# Patient Record
Sex: FEMALE | Race: WHITE | NOT HISPANIC OR LATINO | Employment: FULL TIME | URBAN - METROPOLITAN AREA
[De-identification: names, ages, dates, MRNs, and addresses within clinical notes are randomized per-mention and may not be internally consistent; named-entity substitution may affect disease eponyms.]

---

## 2017-02-23 ENCOUNTER — ALLSCRIPTS OFFICE VISIT (OUTPATIENT)
Dept: OTHER | Facility: OTHER | Age: 52
End: 2017-02-23

## 2017-02-23 ENCOUNTER — TRANSCRIBE ORDERS (OUTPATIENT)
Dept: ADMINISTRATIVE | Facility: HOSPITAL | Age: 52
End: 2017-02-23

## 2017-02-23 DIAGNOSIS — M25.532 LEFT WRIST PAIN: Primary | ICD-10-CM

## 2017-03-02 ENCOUNTER — HOSPITAL ENCOUNTER (OUTPATIENT)
Dept: RADIOLOGY | Age: 52
Discharge: HOME | End: 2017-03-02
Payer: COMMERCIAL

## 2017-03-02 DIAGNOSIS — M25.532 LEFT WRIST PAIN: ICD-10-CM

## 2017-03-02 PROCEDURE — 73221 MRI JOINT UPR EXTREM W/O DYE: CPT

## 2017-03-28 ENCOUNTER — ALLSCRIPTS OFFICE VISIT (OUTPATIENT)
Dept: OTHER | Facility: OTHER | Age: 52
End: 2017-03-28

## 2017-09-12 ENCOUNTER — ALLSCRIPTS OFFICE VISIT (OUTPATIENT)
Dept: OTHER | Facility: OTHER | Age: 52
End: 2017-09-12

## 2017-09-12 ENCOUNTER — TRANSCRIBE ORDERS (OUTPATIENT)
Dept: ADMINISTRATIVE | Facility: HOSPITAL | Age: 52
End: 2017-09-12

## 2017-09-12 DIAGNOSIS — M79.10 MYALGIA: ICD-10-CM

## 2017-09-12 DIAGNOSIS — G44.219 EPISODIC TENSION-TYPE HEADACHE, NOT INTRACTABLE: ICD-10-CM

## 2017-09-12 DIAGNOSIS — E78.00 PURE HYPERCHOLESTEROLEMIA: ICD-10-CM

## 2017-09-12 DIAGNOSIS — M54.2 CERVICALGIA: ICD-10-CM

## 2017-09-12 DIAGNOSIS — G44.219 EPISODIC TENSION-TYPE HEADACHE, NOT INTRACTABLE: Primary | ICD-10-CM
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
## 2017-09-16 ENCOUNTER — APPOINTMENT (OUTPATIENT)
Dept: LAB | Facility: MEDICAL CENTER | Age: 52
End: 2017-09-16
Payer: COMMERCIAL

## 2017-09-16 DIAGNOSIS — E78.00 PURE HYPERCHOLESTEROLEMIA: ICD-10-CM

## 2017-09-16 DIAGNOSIS — M54.2 CERVICALGIA: ICD-10-CM

## 2017-09-16 DIAGNOSIS — G44.219 EPISODIC TENSION-TYPE HEADACHE, NOT INTRACTABLE: ICD-10-CM

## 2017-09-16 DIAGNOSIS — M79.10 MYALGIA: ICD-10-CM

## 2017-09-16 LAB
25(OH)D3 SERPL-MCNC: 31.8 NG/ML (ref 30–100)
ALBUMIN SERPL BCP-MCNC: 4.1 G/DL (ref 3.5–5)
ALP SERPL-CCNC: 58 U/L (ref 46–116)
ALT SERPL W P-5'-P-CCNC: 24 U/L (ref 12–78)
ANION GAP SERPL CALCULATED.3IONS-SCNC: 6 MMOL/L (ref 4–13)
AST SERPL W P-5'-P-CCNC: 19 U/L (ref 5–45)
BASOPHILS # BLD AUTO: 0.04 THOUSANDS/ΜL (ref 0–0.1)
BASOPHILS NFR BLD AUTO: 1 % (ref 0–1)
BILIRUB SERPL-MCNC: 0.54 MG/DL (ref 0.2–1)
BUN SERPL-MCNC: 16 MG/DL (ref 5–25)
CALCIUM SERPL-MCNC: 9.1 MG/DL (ref 8.3–10.1)
CHLORIDE SERPL-SCNC: 107 MMOL/L (ref 100–108)
CHOLEST SERPL-MCNC: 241 MG/DL (ref 50–200)
CO2 SERPL-SCNC: 28 MMOL/L (ref 21–32)
CREAT SERPL-MCNC: 0.89 MG/DL (ref 0.6–1.3)
EOSINOPHIL # BLD AUTO: 0.08 THOUSAND/ΜL (ref 0–0.61)
EOSINOPHIL NFR BLD AUTO: 2 % (ref 0–6)
ERYTHROCYTE [DISTWIDTH] IN BLOOD BY AUTOMATED COUNT: 13.3 % (ref 11.6–15.1)
ERYTHROCYTE [SEDIMENTATION RATE] IN BLOOD: 8 MM/HOUR (ref 0–20)
GFR SERPL CREATININE-BSD FRML MDRD: 75 ML/MIN/1.73SQ M
GLUCOSE P FAST SERPL-MCNC: 89 MG/DL (ref 65–99)
HCT VFR BLD AUTO: 39.1 % (ref 34.8–46.1)
HDLC SERPL-MCNC: 92 MG/DL (ref 40–60)
HGB BLD-MCNC: 12.9 G/DL (ref 11.5–15.4)
LDLC SERPL CALC-MCNC: 134 MG/DL (ref 0–100)
LYMPHOCYTES # BLD AUTO: 2.19 THOUSANDS/ΜL (ref 0.6–4.47)
LYMPHOCYTES NFR BLD AUTO: 45 % (ref 14–44)
MCH RBC QN AUTO: 30.8 PG (ref 26.8–34.3)
MCHC RBC AUTO-ENTMCNC: 33 G/DL (ref 31.4–37.4)
MCV RBC AUTO: 93 FL (ref 82–98)
MONOCYTES # BLD AUTO: 0.38 THOUSAND/ΜL (ref 0.17–1.22)
MONOCYTES NFR BLD AUTO: 8 % (ref 4–12)
NEUTROPHILS # BLD AUTO: 2.12 THOUSANDS/ΜL (ref 1.85–7.62)
NEUTS SEG NFR BLD AUTO: 44 % (ref 43–75)
NRBC BLD AUTO-RTO: 0 /100 WBCS
PLATELET # BLD AUTO: 256 THOUSANDS/UL (ref 149–390)
PMV BLD AUTO: 10.8 FL (ref 8.9–12.7)
POTASSIUM SERPL-SCNC: 3.8 MMOL/L (ref 3.5–5.3)
PROT SERPL-MCNC: 7.5 G/DL (ref 6.4–8.2)
RBC # BLD AUTO: 4.19 MILLION/UL (ref 3.81–5.12)
SODIUM SERPL-SCNC: 141 MMOL/L (ref 136–145)
TRIGL SERPL-MCNC: 76 MG/DL
TSH SERPL DL<=0.05 MIU/L-ACNC: 2.29 UIU/ML (ref 0.36–3.74)
WBC # BLD AUTO: 4.82 THOUSAND/UL (ref 4.31–10.16)

## 2017-09-16 PROCEDURE — 86038 ANTINUCLEAR ANTIBODIES: CPT

## 2017-09-16 PROCEDURE — 86618 LYME DISEASE ANTIBODY: CPT

## 2017-09-16 PROCEDURE — 86430 RHEUMATOID FACTOR TEST QUAL: CPT

## 2017-09-16 PROCEDURE — 84443 ASSAY THYROID STIM HORMONE: CPT

## 2017-09-16 PROCEDURE — 82306 VITAMIN D 25 HYDROXY: CPT

## 2017-09-16 PROCEDURE — 86617 LYME DISEASE ANTIBODY: CPT

## 2017-09-16 PROCEDURE — 85652 RBC SED RATE AUTOMATED: CPT

## 2017-09-16 PROCEDURE — 36415 COLL VENOUS BLD VENIPUNCTURE: CPT

## 2017-09-16 PROCEDURE — 85025 COMPLETE CBC W/AUTO DIFF WBC: CPT

## 2017-09-16 PROCEDURE — 80053 COMPREHEN METABOLIC PANEL: CPT

## 2017-09-16 PROCEDURE — 80061 LIPID PANEL: CPT

## 2017-09-18 ENCOUNTER — GENERIC CONVERSION - ENCOUNTER (OUTPATIENT)
Dept: OTHER | Facility: OTHER | Age: 52
End: 2017-09-18

## 2017-09-18 LAB
B BURGDOR IGG SER IA-ACNC: 0.14
B BURGDOR IGM SER IA-ACNC: 0.89
RHEUMATOID FACT SER QL LA: NEGATIVE
RYE IGE QN: NEGATIVE

## 2017-09-19 ENCOUNTER — GENERIC CONVERSION - ENCOUNTER (OUTPATIENT)
Dept: OTHER | Facility: OTHER | Age: 52
End: 2017-09-19

## 2017-09-19 ENCOUNTER — HOSPITAL ENCOUNTER (OUTPATIENT)
Dept: RADIOLOGY | Facility: HOSPITAL | Age: 52
Discharge: HOME | End: 2017-09-19
Payer: COMMERCIAL

## 2017-09-19 DIAGNOSIS — G44.219 EPISODIC TENSION-TYPE HEADACHE, NOT INTRACTABLE: ICD-10-CM

## 2017-09-19 LAB
B BURGDOR IGG PATRN SER IB-IMP: NEGATIVE
B BURGDOR IGM PATRN SER IB-IMP: NEGATIVE
B BURGDOR18KD IGG SER QL IB: ABNORMAL
B BURGDOR23KD IGG SER QL IB: ABNORMAL
B BURGDOR23KD IGM SER QL IB: ABNORMAL
B BURGDOR28KD IGG SER QL IB: ABNORMAL
B BURGDOR30KD IGG SER QL IB: ABNORMAL
B BURGDOR39KD IGG SER QL IB: ABNORMAL
B BURGDOR39KD IGM SER QL IB: ABNORMAL
B BURGDOR41KD IGG SER QL IB: PRESENT
B BURGDOR41KD IGM SER QL IB: ABNORMAL
B BURGDOR45KD IGG SER QL IB: ABNORMAL
B BURGDOR58KD IGG SER QL IB: ABNORMAL
B BURGDOR66KD IGG SER QL IB: ABNORMAL
B BURGDOR93KD IGG SER QL IB: ABNORMAL

## 2017-09-19 PROCEDURE — 70551 MRI BRAIN STEM W/O DYE: CPT

## 2017-09-20 ENCOUNTER — GENERIC CONVERSION - ENCOUNTER (OUTPATIENT)
Dept: OTHER | Facility: OTHER | Age: 52
End: 2017-09-20

## 2017-10-14 ENCOUNTER — APPOINTMENT (OUTPATIENT)
Dept: LAB | Facility: MEDICAL CENTER | Age: 52
End: 2017-10-14
Payer: COMMERCIAL
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
## 2017-10-14 PROCEDURE — 36415 COLL VENOUS BLD VENIPUNCTURE: CPT

## 2017-10-14 PROCEDURE — 86617 LYME DISEASE ANTIBODY: CPT

## 2017-10-14 PROCEDURE — 86618 LYME DISEASE ANTIBODY: CPT

## 2017-10-16 ENCOUNTER — GENERIC CONVERSION - ENCOUNTER (OUTPATIENT)
Dept: OTHER | Facility: OTHER | Age: 52
End: 2017-10-16

## 2017-10-16 LAB
B BURGDOR IGG SER IA-ACNC: 0.17
B BURGDOR IGM SER IA-ACNC: 0.84

## 2017-10-17 LAB

## 2017-10-18 ENCOUNTER — GENERIC CONVERSION - ENCOUNTER (OUTPATIENT)
Dept: OTHER | Facility: OTHER | Age: 52
End: 2017-10-18

## 2017-10-29 ENCOUNTER — HOSPITAL ENCOUNTER (EMERGENCY)
Facility: HOSPITAL | Age: 52
Discharge: HOME | End: 2017-10-29
Attending: EMERGENCY MEDICINE | Admitting: EMERGENCY MEDICINE
Payer: COMMERCIAL

## 2017-10-29 VITALS
OXYGEN SATURATION: 99 % | HEIGHT: 66 IN | DIASTOLIC BLOOD PRESSURE: 73 MMHG | WEIGHT: 148 LBS | HEART RATE: 52 BPM | TEMPERATURE: 97.4 F | RESPIRATION RATE: 18 BRPM | BODY MASS INDEX: 23.78 KG/M2 | SYSTOLIC BLOOD PRESSURE: 149 MMHG

## 2017-10-29 DIAGNOSIS — R51.9 HEADACHE: Primary | ICD-10-CM

## 2017-10-29 LAB
BACTERIA UR QL AUTO: NORMAL /HPF
BILIRUB UR QL STRIP: NEGATIVE
CLARITY UR: CLEAR
COLOR UR: YELLOW
EXT PREG TEST URINE: NEGATIVE
GLUCOSE UR STRIP-MCNC: NEGATIVE MG/DL
HGB UR QL STRIP.AUTO: NEGATIVE
HYALINE CASTS #/AREA URNS LPF: NORMAL /LPF
KETONES UR STRIP-MCNC: NEGATIVE MG/DL
LEUKOCYTE ESTERASE UR QL STRIP: ABNORMAL
NITRITE UR QL STRIP: NEGATIVE
NON-SQ EPI CELLS URNS QL MICRO: NORMAL /HPF
PH UR STRIP.AUTO: 6 [PH] (ref 4.5–8)
PROT UR STRIP-MCNC: NEGATIVE MG/DL
RBC #/AREA URNS AUTO: NORMAL /HPF
SP GR UR STRIP.AUTO: 1.01 (ref 1–1.03)
UROBILINOGEN UR QL STRIP.AUTO: 0.2 E.U./DL
WBC #/AREA URNS AUTO: NORMAL /HPF

## 2017-10-29 PROCEDURE — 96361 HYDRATE IV INFUSION ADD-ON: CPT

## 2017-10-29 PROCEDURE — 81001 URINALYSIS AUTO W/SCOPE: CPT

## 2017-10-29 PROCEDURE — 81025 URINE PREGNANCY TEST: CPT | Performed by: EMERGENCY MEDICINE

## 2017-10-29 PROCEDURE — 99283 EMERGENCY DEPT VISIT LOW MDM: CPT

## 2017-10-29 PROCEDURE — 96375 TX/PRO/DX INJ NEW DRUG ADDON: CPT

## 2017-10-29 PROCEDURE — 96374 THER/PROPH/DIAG INJ IV PUSH: CPT

## 2017-10-29 RX ORDER — DIPHENHYDRAMINE HYDROCHLORIDE 50 MG/ML
25 INJECTION INTRAMUSCULAR; INTRAVENOUS ONCE
Status: DISCONTINUED | OUTPATIENT
Start: 2017-10-29 | End: 2017-10-29

## 2017-10-29 RX ORDER — BUTALBITAL, ASPIRIN, AND CAFFEINE 50; 325; 40 MG/1; MG/1; MG/1
1 CAPSULE ORAL EVERY 8 HOURS PRN
COMMUNITY
End: 2018-06-21

## 2017-10-29 RX ORDER — GABAPENTIN 100 MG/1
100 CAPSULE ORAL 3 TIMES DAILY
COMMUNITY
End: 2018-02-06 | Stop reason: SDUPTHER

## 2017-10-29 RX ORDER — ACETAMINOPHEN 325 MG/1
975 TABLET ORAL ONCE
Status: DISCONTINUED | OUTPATIENT
Start: 2017-10-29 | End: 2017-10-29

## 2017-10-29 RX ORDER — ONDANSETRON 4 MG/1
4 TABLET, FILM COATED ORAL EVERY 6 HOURS
Qty: 20 TABLET | Refills: 0 | Status: SHIPPED | OUTPATIENT
Start: 2017-10-29 | End: 2018-06-14

## 2017-10-29 RX ORDER — METOCLOPRAMIDE HYDROCHLORIDE 5 MG/ML
10 INJECTION INTRAMUSCULAR; INTRAVENOUS ONCE
Status: COMPLETED | OUTPATIENT
Start: 2017-10-29 | End: 2017-10-29

## 2017-10-29 RX ORDER — KETOROLAC TROMETHAMINE 30 MG/ML
15 INJECTION, SOLUTION INTRAMUSCULAR; INTRAVENOUS ONCE
Status: COMPLETED | OUTPATIENT
Start: 2017-10-29 | End: 2017-10-29

## 2017-10-29 RX ORDER — DIPHENHYDRAMINE HYDROCHLORIDE 50 MG/ML
25 INJECTION INTRAMUSCULAR; INTRAVENOUS ONCE
Status: COMPLETED | OUTPATIENT
Start: 2017-10-29 | End: 2017-10-29

## 2017-10-29 RX ORDER — METOCLOPRAMIDE HYDROCHLORIDE 5 MG/ML
10 INJECTION INTRAMUSCULAR; INTRAVENOUS ONCE
Status: DISCONTINUED | OUTPATIENT
Start: 2017-10-29 | End: 2017-10-29

## 2017-10-29 RX ORDER — ONDANSETRON 2 MG/ML
4 INJECTION INTRAMUSCULAR; INTRAVENOUS ONCE
Status: COMPLETED | OUTPATIENT
Start: 2017-10-29 | End: 2017-10-29

## 2017-10-29 RX ADMIN — DIPHENHYDRAMINE HYDROCHLORIDE 25 MG: 50 INJECTION, SOLUTION INTRAMUSCULAR; INTRAVENOUS at 13:00

## 2017-10-29 RX ADMIN — KETOROLAC TROMETHAMINE 15 MG: 30 INJECTION, SOLUTION INTRAMUSCULAR at 12:55

## 2017-10-29 RX ADMIN — METOCLOPRAMIDE 10 MG: 5 INJECTION, SOLUTION INTRAMUSCULAR; INTRAVENOUS at 13:02

## 2017-10-29 RX ADMIN — SODIUM CHLORIDE 1000 ML: 0.9 INJECTION, SOLUTION INTRAVENOUS at 12:50

## 2017-10-29 RX ADMIN — ONDANSETRON 4 MG: 2 INJECTION INTRAMUSCULAR; INTRAVENOUS at 12:51

## 2017-10-29 NOTE — DISCHARGE INSTRUCTIONS
Continue with the scheduled Neurology appointment as previously directed  Return with any focal numbness, tingling, weakness, fevers, worsening of headache, vision changes, fevers, chills or any other concerning symptoms  General Headache   WHAT YOU NEED TO KNOW:   Headache pain may be mild or severe  Common causes include stress, medicines, and head injuries  Sleep problems, allergies, and hormone changes can also cause a headache  You may have frequent headaches that have no clear cause  Pain may start in another part of your body and move to your head  Headache pain can also move to other parts of your body  A headache can cause other symptoms, such as nausea and vomiting  A severe headache may be a sign of a stroke or other serious problem that needs immediate treatment  DISCHARGE INSTRUCTIONS:   Call 911 for any of the following:   · You have any of the following signs of a stroke:    ? Numbness or drooping on one side of your face    ? Weakness in an arm or leg   ? Confusion or difficulty speaking   ? Dizziness, a severe headache, or vision loss     Return to the emergency department if:   · You have a headache with neck stiffness and a fever  · You have a constant headache and are vomiting  · You have severe pain that does not get better after you take pain medicine  · You have a headache and the pain worsens when you look into light  · You have a headache and vision changes, such as blurred vision  · You have a headache and are forgetful or confused  Contact your healthcare provider if:   · You have a headache each day that does not get better, even after treatment  · You have changes in your headaches, or new symptoms that occur when you have a headache  · Others you live or work with also have headaches  · You have questions or concerns about your condition or care  Medicines: You may need any of the following:  · Medicines may be given to prevent or treat headache pain   Do not wait until the pain is severe to take your medicine  Ask your healthcare provider how to take the medicine safely  · NSAIDs , such as ibuprofen, help decrease swelling, pain, and fever  This medicine is available with or without a doctor's order  NSAIDs can cause stomach bleeding or kidney problems in certain people  If you take blood thinner medicine, always ask if NSAIDs are safe for you  Always read the medicine label and follow directions  Do not give these medicines to children under 10months of age without direction from your child's healthcare provider  · Acetaminophen decreases pain and fever  It is available without a doctor's order  Ask how much to take and how often to take it  Follow directions  Read the labels of all other medicines you are using to see if they also contain acetaminophen, or ask your doctor or pharmacist  Acetaminophen can cause liver damage if not taken correctly  Do not use more than 4 grams (4,000 milligrams) total of acetaminophen in one day  · Antinausea medicine may be given to calm your stomach and help prevent vomiting  · Take your medicine as directed  Contact your healthcare provider if you think your medicine is not helping or if you have side effects  Tell him of her if you are allergic to any medicine  Keep a list of the medicines, vitamins, and herbs you take  Include the amounts, and when and why you take them  Bring the list or the pill bottles to follow-up visits  Carry your medicine list with you in case of an emergency  Manage your symptoms:   · Rest in a dark and quiet room  This may help decrease your pain  · Apply heat or ice as directed  Heat or ice may help decrease pain or muscle spasms  Apply heat or ice on the area for 20 minutes every 2 hours for as many days as directed  Your healthcare provider may recommend that you alternate heat and ice  · Relax your muscles to help relieve a headache  Lie down in a comfortable position and close your eyes  Relax your muscles slowly  Start at your toes and work your way up your body  A massage or warm bath may also help relax your muscles  Keep a headache record: Record the dates and times that you get headaches, and what you were doing before the headache started  Also record what you ate and drank in the 24 hours before the headache started  This might help your healthcare provider find the cause of your headaches and make a treatment plan  The record can also help you avoid headache triggers or manage your symptoms  Get enough sleep: You should get 8 to 10 hours of sleep each night  Create a sleep schedule  Go to bed and wake up at the same times each day  It may be helpful to do something relaxing before bed  Do not watch television right before bed  Do not smoke: Nicotine and other chemicals in cigarettes and cigars can trigger a headache or make it worse  Ask your healthcare provider for information if you currently smoke and need help to quit  E-cigarettes or smokeless tobacco still contain nicotine  Talk to your healthcare provider before you use these products  Drink liquids as directed: You may need to drink more liquid to prevent dehydration  Dehydration can cause a headache  Ask your healthcare provider how much liquid to drink each day and which liquids are best for you  Limit caffeine and alcohol as directed: Your headaches may be triggered by caffeine or alcohol  You may also develop a headache if you drink caffeine regularly and suddenly stop  Eat a variety of healthy foods: Do not skip meals  Too little food can trigger a headache  Include fruits, vegetables, whole-grain breads, low-fat dairy products, beans, lean meat, and fish  Do not have trigger foods, such as chocolate and red wine  Foods that contain gluten, nitrates, MSG, or artificial sweeteners may also trigger a headache    Follow up with your healthcare provider as directed: Write down your questions so you remember to ask them during

## 2017-10-29 NOTE — ED ATTENDING ATTESTATION
Mary Huddleston DO, saw and evaluated the patient  I have discussed the patient with the resident/non-physician practitioner and agree with the resident's/non-physician practitioner's findings, Plan of Care, and MDM as documented in the resident's/non-physician practitioner's note, except where noted  All available labs and Radiology studies were reviewed  At this point I agree with the current assessment done in the Emergency Department  I have conducted an independent evaluation of this patient a history and physical is as follows:     66-year-old female presents with a chief complaint of headache for 6 months  She states that she is being evaluated by her family doctor and has appointment with a neurologist   She states that her headache does not limit her from going to work and she does have periods of relief  she is afebrile, headache was not sudden onset and it has gradually worsened and is waxing and waning in nature  Review of systems otherwise negative    /73   Pulse (!) 52   Temp (!) 97 4 °F (36 3 °C) (Tympanic)   Resp 18   Ht 5' 6" (1 676 m)   Wt 67 1 kg (148 lb)   SpO2 99%   BMI 23 89 kg/m²     Physical exam unremarkable     patient has a recent normal MRI of her brain on record from 1 month ago  patient improved with Toradol, Reglan, Benadryl  She was offered an LP to evaluate her opening pressure and rule out benign intracranial hypertension as well as order viral studies such as Lyme and West Nile  She declined the LP and preferred to follow up with her neurologist who she is planning on seeking a lumbar puncture with at that time  Her appointment is  In 3 weeks        Diagnosis   chronic headache     discharged        Critical Care Time  CritCare Time

## 2017-10-29 NOTE — ED NOTES
Dr Mariam Gardiner at patient bedside to medically evaluate patient       Bryan Cunningham RN  10/29/17 9647

## 2017-10-29 NOTE — ED PROVIDER NOTES
History  Chief Complaint   Patient presents with    Headache - Recurrent or Known Dx Migraines     Patient states headaches since may  Patient also has been worked up by Raul Pineda for [employer]  Cannot get into neurologist until nov 20  +nausea, unable to sleep at night  Patient already had MRI completed  35-year-old female with no significant past medical history presents for evaluation of a headache  Patient reports since May, for the past 6 months she has had a headache every day that is constant, diffuse, throbbing, 4/10, located at the crown of her head  Reports associated nausea without vomiting  He no diplopia, blurring of vision  Reports intermittent vertiginous symptoms the that last for a few minutes at a time  She denies any fever, chills, falls or head trauma  My the she reports her LMP was in 2008  At she reports she has seen her primary care doctor for this who ordered an MRI which showed scattered parenchymal changes that are nonspecific  She has also had to be separate Lyme workups both of which were negative  She is currently taking Fioricet which mildly improved her symptoms as well as gabapentin  She reports she has a scheduled appointment with Neurology at the end of next month  Prior to Admission Medications   Prescriptions Last Dose Informant Patient Reported? Taking? MULTIPLE VITAMINS ESSENTIAL PO Past Week at Unknown time  Yes Yes   Sig: Take by mouth   butalbital-aspirin-caffeine (FIORINAL) -40 mg capsule  Self Yes Yes   Sig: Take 1 capsule by mouth every 8 (eight) hours as needed for headaches   gabapentin (NEURONTIN) 100 mg capsule   Yes Yes   Sig: Take 100 mg by mouth 3 (three) times a day 100 in the am and after noon  Patient takes 200 at night time  Facility-Administered Medications: None       Past Medical History:   Diagnosis Date    Chronic back pain     Migraine        History reviewed  No pertinent surgical history  History reviewed   No pertinent family history  I have reviewed and agree with the history as documented  Social History   Substance Use Topics    Smoking status: Former Smoker    Smokeless tobacco: Never Used    Alcohol use No        Review of Systems   Constitutional: Negative for chills, fatigue and fever  HENT: Negative for congestion, ear pain, postnasal drip, rhinorrhea, sinus pressure and trouble swallowing  Eyes: Negative for photophobia, pain and visual disturbance  Respiratory: Negative for cough, chest tightness, shortness of breath and wheezing  Cardiovascular: Negative for chest pain and palpitations  Gastrointestinal: Positive for nausea  Negative for abdominal distention, abdominal pain, constipation, diarrhea and vomiting  Genitourinary: Negative for difficulty urinating, dysuria, flank pain, hematuria and urgency  Musculoskeletal: Negative for arthralgias, back pain, myalgias and neck stiffness  Skin: Negative for rash and wound  Neurological: Positive for headaches  Negative for dizziness, seizures, syncope, weakness, light-headedness and numbness  Hematological: Negative for adenopathy  Physical Exam  ED Triage Vitals [10/29/17 1133]   Temperature Pulse Respirations Blood Pressure SpO2   (!) 97 4 °F (36 3 °C) 72 18 164/72 100 %      Temp Source Heart Rate Source Patient Position - Orthostatic VS BP Location FiO2 (%)   Tympanic Monitor Lying Left arm --      Pain Score       6           Orthostatic Vital Signs  Vitals:    10/29/17 1133 10/29/17 1443 10/29/17 1445   BP: 164/72 149/73 149/73   Pulse: 72 57 (!) 52   Patient Position - Orthostatic VS: Lying Lying        Physical Exam   Constitutional: She is oriented to person, place, and time  Vital signs are normal  She appears well-developed and well-nourished  She does not appear ill  No distress  HENT:   Head: Normocephalic and atraumatic  Head is without raccoon's eyes, without Wade's sign, without abrasion and without contusion     Right Ear: Hearing and tympanic membrane normal    Left Ear: Hearing and tympanic membrane normal    Nose: Nose normal  Right sinus exhibits no maxillary sinus tenderness and no frontal sinus tenderness  Left sinus exhibits no maxillary sinus tenderness and no frontal sinus tenderness  Mouth/Throat: Uvula is midline, oropharynx is clear and moist and mucous membranes are normal  Mucous membranes are not dry  No oropharyngeal exudate, posterior oropharyngeal edema, posterior oropharyngeal erythema or tonsillar abscesses  No tonsillar exudate  Eyes: Conjunctivae and EOM are normal  Pupils are equal, round, and reactive to light  Right conjunctiva is not injected  Left conjunctiva is not injected  Right eye exhibits normal extraocular motion and no nystagmus  Left eye exhibits normal extraocular motion and no nystagmus  Fundoscopic exam:       The right eye shows no hemorrhage and no papilledema  The left eye shows no hemorrhage and no papilledema  Neck: Trachea normal, normal range of motion, full passive range of motion without pain and phonation normal  Neck supple  No JVD present  No spinous process tenderness and no muscular tenderness present  Carotid bruit is not present  No neck rigidity  Normal range of motion present  No Brudzinski's sign and no Kernig's sign noted  No thyroid mass and no thyromegaly present  Neck is supple, no limitation in active or passive range of motion  Cardiovascular: Normal rate, regular rhythm and intact distal pulses  No murmur heard  Pulmonary/Chest: Effort normal and breath sounds normal  No stridor  She has no decreased breath sounds  She has no wheezes  She has no rhonchi  She has no rales  She exhibits no tenderness and no crepitus  Abdominal: Soft  Normal appearance  She exhibits no distension  There is no tenderness  There is no rigidity, no rebound, no guarding and no CVA tenderness  Musculoskeletal: Normal range of motion     Lymphadenopathy:     She has no cervical adenopathy  Neurological: She is alert and oriented to person, place, and time  She has normal strength  She is not disoriented  No cranial nerve deficit or sensory deficit  Gait normal  GCS eye subscore is 4  GCS verbal subscore is 5  GCS motor subscore is 6  Unremarkable cranial nerve exam  Pupils 4 mm equal round reactive to light  No nystagmus, normal extraocular motion  5 out of 5 upper and lower extremity strength  No subjective sensory deficits to the face, upper or lower extremity  Normal finger-nose and heel shin  Normal gait  Skin: Skin is warm, dry and intact  Capillary refill takes less than 2 seconds  No rash noted  She is not diaphoretic  Vitals reviewed        ED Medications  Medications   ketorolac (TORADOL) 30 mg/mL injection 15 mg (15 mg Intravenous Given 10/29/17 1255)   ondansetron (ZOFRAN) injection 4 mg (4 mg Intravenous Given 10/29/17 1251)   diphenhydrAMINE (BENADRYL) injection 25 mg (25 mg Intravenous Given 10/29/17 1300)   metoclopramide (REGLAN) injection 10 mg (10 mg Intravenous Given 10/29/17 1302)       Diagnostic Studies  Results Reviewed     Procedure Component Value Units Date/Time    Urine Microscopic [[id number]]  (Normal) Collected:  10/29/17 1300    Lab Status:  Final result Specimen:  Urine from Urine, Clean Catch Updated:  10/29/17 1333     RBC, UA None Seen /hpf      WBC, UA None Seen /hpf      Epithelial Cells None Seen /hpf      Bacteria, UA None Seen /hpf      Hyaline Casts, UA None Seen /lpf     POCT pregnancy, urine [[id number]]  (Normal) Resulted:  10/29/17 1258    Lab Status:  Final result Specimen:  Urine Updated:  10/29/17 1258     EXT PREG TEST UR (Ref: Negative) Negative    ED Urine Macroscopic [[id number]]  (Abnormal) Collected:  10/29/17 1300    Lab Status:  Final result Specimen:  Urine Updated:  10/29/17 1255     Color, UA Yellow     Clarity, UA Clear     pH, UA 6 0     Leukocytes, UA Small (A)     Nitrite, UA Negative     Protein, UA Negative mg/dl      Glucose, UA Negative mg/dl      Ketones, UA Negative mg/dl      Urobilinogen, UA 0 2 E U /dl      Bilirubin, UA Negative     Blood, UA Negative     Specific Gravity, UA 1 010    Narrative:       CLINITEK RESULT                 No orders to display         Procedures  Procedures      Phone Consults  ED Phone Contact    ED Course  ED Course as of Oct 30 1611   Marta Bose Oct 29, 2017   1221 Neuro apt 20th of nov  Had MRI in sept  Taking fiorecept  Retesting for lyme in dec      1421 Patient refusing LP      Discussed with patient that LP may be beneficial to evaluate for opening pressure, source of infection however after discussing risks and benefits patient not willing to undergo lumbar puncture was to wait for scheduled Neurology appointment at the end of the month  MDM  CritCare Time    Disposition  Final diagnoses:   Headache     Time reflects when diagnosis was documented in both MDM as applicable and the Disposition within this note     Time User Action Codes Description Comment    10/29/2017  2:30 PM Maciej Manley Add [R51] Headache       ED Disposition     ED Disposition Condition Comment    Discharge  Mabel Ye discharge to home/self care      Condition at discharge: Good        Follow-up Information     Follow up With Specialties Details Why Contact Info Additional [address] Emergency Department Emergency Medicine Go to If symptoms worsen [address]  [phone]  ED, [address] [place] [address], [address] [facility],  Family Medicine Schedule an appointment as soon as possible for a visit in 2 days As needed [facility] Nargis Cagle [address]       Rosa Man MD Neurology Go to to scheduled appointment  [address]  [phone]           Discharge Medication List as of 10/29/2017  2:32 PM      CONTINUE these medications which have NOT CHANGED    Details   butalbital-aspirin-caffeine (FIORINAL) -40 mg capsule Take 1 capsule by mouth every 8 (eight) hours as needed for headaches, Historical Med      gabapentin (NEURONTIN) 100 mg capsule Take 100 mg by mouth 3 (three) times a day 100 in the am and after noon  Patient takes 200 at night time , Historical Med      MULTIPLE VITAMINS ESSENTIAL PO Take by mouth, Until Discontinued, Historical Med           No discharge procedures on file  ED Provider  Attending physically available and evaluated Tangela Mayfield I managed the patient along with the ED Attending      Electronically Signed by         Mine Nelson DO  Resident  10/30/17 3200

## 2017-10-29 NOTE — ED NOTES
Physician started to cancelled orders while RN was in room with patient medicating her  Per physician continue with medications since patient was already being medicated at time of changes         Gretta Eagle RN  10/29/17 8032

## 2017-11-10 DIAGNOSIS — G44.219 EPISODIC TENSION-TYPE HEADACHE, NOT INTRACTABLE: ICD-10-CM

## 2017-11-10 DIAGNOSIS — T58.8X1A TOXIC EFFECT OF CARBON MONOXIDE FROM OTHER SOURCE, ACCIDENTAL (UNINTENTIONAL), INITIAL ENCOUNTER: ICD-10-CM

## 2017-11-13 ENCOUNTER — ALLSCRIPTS OFFICE VISIT (OUTPATIENT)
Dept: OTHER | Facility: OTHER | Age: 52
End: 2017-11-13

## 2017-11-13 ENCOUNTER — TRANSCRIBE ORDERS (OUTPATIENT)
Dept: ADMINISTRATIVE | Facility: HOSPITAL | Age: 52
End: 2017-11-13

## 2017-11-13 DIAGNOSIS — T58.8X1A TOXIC EFFECT OF CARBON MONOXIDE FROM OTHER SOURCE, ACCIDENTAL (UNINTENTIONAL), INITIAL ENCOUNTER: Primary | ICD-10-CM

## 2017-11-14 NOTE — CONSULTS
Assessment    1  Complicated migraine (363 90) (G43 109)  2  Vestibular migraine (346 00) (G43 109)  3  Accidental poisoning by carbon monoxide from sources (Y048 0) (T58 8X1A)  4  Chronic sinusitis (473 9) (J32 9)  5  Abnormal brain MRI (793 0) (R90 89)  6  Hypertension (401 9) (I10)  7  Seasonal allergies (477 9) (J30 2)  8  Tick bite (919 4,E906 4) (W57 XXXA)1      1 Amended By: Coy Lowe; Nov 13 2017 1:28 PM EST    Plan  Accidental poisoning by carbon monoxide from sources    · (1) CO SATURATION; Status:Active; Requested for:13Nov2017;   Perform:[facility]; ILU:[id number]; Ordered; For:Accidental poisoning by carbon monoxide from sources; Ordered By:Patricia Mccoy;   · CTA HEAD AND NECK W WO CONTRAST; Status:Need Information - FinancialAuthorization; Requested for:13Nov2017;   Perform:[facility]; XDT:[id number]; Last Updated By:Katelynn Arroyo; 11/13/2017 9:13:53 AM;Ordered; For:Accidental poisoning by carbon monoxide from sources; Ordered By:Patricia Mccoy;  Cervicalgia    · Renew: Gabapentin 100 MG Oral Capsule; TAKE 1 CAPSULE IN AM , 1 CAPSULE ATNOON  AND 2 CAPSULES AT NIGHT  Rx By: Coy Lowe; Dispense: 30 Days ; #:120 Capsule; Refill: 0;For: Cervicalgia; KAJAL = N; Faxed To: [facility]/PHARMACY #[id number]  Complicated migraine    · Start: Ketorolac Tromethamine 10 MG Oral Tablet; TAKE 1 TABLET 3 TIMES A DAY, TAKEAT ONSET OF HEADACHES  Max 3 days/week  Rx By: Coy Lowe; Dispense: 30 Days ; #:30 Tablet; Refill: 5;For: Complicated migraine; KAJAL = N; Faxed To: [facility]/PHARMACY #[id number]  · Start: Prochlorperazine Maleate 5 MG Oral Tablet; Take 1 tab TID PRN headache/nausea  Rx By: Coy Lowe; Dispense: 30 Days ; #:30 Tablet; Refill: 1;For: Complicated migraine; KAJAL = N; Faxed To: [facility]/PHARMACY #[id number]  · Start: Verapamil HCl - 40 MG Oral Tablet; Take 1 tablet daily at night x 1 week then 1 tabletBID  Rx By: Coy Lowe; Dispense: 0 Days ; #:60 Tablet;  Refill: 5;For: Complicated migraine; KAJAL = N; Faxed To: [facility]/PHARMACY #[id number] Episodic tension-type headache, not intractable    · (1) ESTRADIOL; Status:Active; Requested for:10Nov2017;   Perform:[facility]; Due:10Nov2018; Ordered; For:Episodic tension-type headache, not intractable; Ordered By:Iraida Chicas;   · (1) [facility]; Status:Active; Requested for:10Nov2017;   Perform:[facility]; Due:10Nov2018; Ordered; For:Episodic tension-type headache, not intractable; Ordered By:Carmen Chicas;   · (1) LH (LEUTINIZING HORMONE); Status:Active; Requested for:10Nov2017;   Perform:[facility]; Due:10Nov2018; Ordered; For:Episodic tension-type headache, not intractable; Ordered By:Iraida Chicas;  Vestibular migraine    · Follow-up visit in 2 months Evaluation and Treatment  Follow-up  Status: Complete Done: 87FZP1906  Ordered; For: Vestibular migraine;  Ordered By: Norberto Callahan  Performed:   Due: 23NGX2205; Last Updated By: Pablo Justin; 11/13/2017 9:15:09 AM    Discussion/Summary  Discussion Summary:   New onset headaches since April migrainous with a significant vestibular component with no specific inciting etiology:Almost daily headaches for which she is taking Excedrin 4-5 times a day abortively  Discussed not doing so  Will give Toradol and Compazine to take at very onset of moderate to severe migraine for abortive treatment  Max 3 days a week  Max 3 times a day  Preventative: C/w gabapentin 100/100/200 as this has been helpful to Sienna add Verapamil: start at 40 mg qhs x 1 week then 40 BID  If she tolerates this well without adverse effects, will start her on Verapamil 100 ER if needed going forward  CTA head and neck given imbalance/ vertigo in all directions of gaze  Lyme disease testing- equivocal positive testingFurther work up by PCP as warranted  CO poisoning at work with generator at work during [employer]  States the vents are being opened now when she is in the  periodically and the fumes come downConcern for repeat CO exposure- will obtain blood work for this  She tells me there is no CO detector in that room/ office space- discussed we will notify OSHA in this regard for work safety  Certainly exposure to CO can contribute to refractory headaches and associated lightheadedness and memory impairment  In regards to her MRI brain- discussed with her that she has mild white matter changes which can be seen with aging or history of tobacco use and in her case history of CO poisoning in the past  I would repeat MRI Brain going forward only if clinically warranted/ if she develops new neurologic deficits  1  Neurologic exam otherwise grossly non focal at this timeup in two months time  Counseling Documentation With Imm: The patient was counseled regarding  Medication SE Review and Pt Understands Tx: Possible side effects of new medications were reviewed with the patient/guardian today  The treatment plan was reviewed with the patient/guardian  The patient/guardian understands and agrees with the treatment plan       1 Amended By: Dave Montalvo; Nov 13 2017 1:30 PM EST    Chief Complaint  Chief Complaint Free Text Note Form: Pt presents for consult of migraines and abnormal MRI done by PCP      History of Present Illness  HPI: Ms Linh Ohara is a pleasant 45 yo female presenting with headaches starting in April initially similar to her typical allergy headache but states has developed below associated symptoms since then:light headedness, near syncope, blurry vision, concentration difficulties, Phonophobia, severe nausea vomiting, mood changes  aurame worsening frequency since August: every dayvariable severity but never go away  tolerable when she takes her Excedrin but has to take it 4-5 times a day and she has been doing this daily since August  States if not severe headaches that will not allow her to function or go to work  Gabapentin started by her PCP: 100/100/200 is significantly helpful to her  She states otherwise even Excedrin was not helpful  tried Fioricet - not helpful for her  Posterior head region and everything including her teeth hurt  Throbbing, pressure, feels as if her head is opening up from inside  Moderate to severe every day if she does not take Excedrinrecently hypertensive when she has checked her readings 140s/84-94 but no official diagnosis of HTN use over 25 years ago but not now (States smoked for at least a decade then)family history of migraines, intracranial bleeds or aneurysms  Has been taking Excedrin migraine every four to six hours since Augusthistory of allergy associated headaches more prominent frontally in her sinus region with no associated deficits  of CO poisoning in 2012 occurred at work during [facility] when she was by herself at a back up generator was turned on and she was unknowingly exposed to [address]  [address] fumes, was found unconscious at work- treated at [facility]- states since then residual mild speech change, paraphasic errors at time  me at her work now, she is having likely CO exposure again due to periodic opening of the vent where CO fumes come from, and states is worried is being exposed to [address] at [facility] [place], operated by [facility] [employer] [facility] per her  She tells me they do not have a CO detector as far as she is aware  I discussed that we would notify OSHA regarding this for safety concerns or she could  does report history tick bite few months ago but denies any typical bulls eye rash or arthralgias or significant fatigue- is undergoing Lyme testing per PCP  1    Neurology [facility]:  Headache: On a scale of 0-10, the pain severity is a 3  the headaches started at age 46   no accidents or injury prior to the onset of headaches  Headaches are occurring daily-- and-- during various time of the day  headaches are continuously present    Currently the pain is bilateral,-- in the temporal region,-- at the vertex-- and-- in the occipital region  Warning(s) prior to headache include no warnings  Usual headache is described as throbbing, pounding and sharp  Associated symptoms include phonophobia,-- blurred vision,-- loss of appetite,-- nausea,-- with darkness,-- lightheaded or dizzy,-- stiff or sore neck,-- inability to work-- and-- unable to work at times only- states usually, but-- no photophobia,-- no tinnitus,-- no lacrimation,-- no sensitivity to smell,-- no flushing,-- no vomiting,-- no runny or stuffed up nose,-- no tingling or numbness of the hands-- and-- no tingling or numbness of the feet  1 Amended By: Dave Montalvo; Nov 13 2017 1:28 PM EST    Review of Systems  Neurological ROS:  Constitutional: fatigue-- and-- appetite changes  HEENT: blurred vision  Cardiovascular: chest pain or pressure,-- palpitations present -- and-- rapid or irregular heart rate  Respiratory:  no unusual or persistant cough, no shortness of breath with or without exertion  Gastrointestinal: nausea  Genitourinary: feelings of urinary urgency  Musculoskeletal: arthralgias,-- myalgias-- and-- head/neck/back pain  Integumentary rash: Joycie Pata Psychiatric: anxiety,-- depression-- and-- mood swings  Endocrine  no unusual weight loss or gain, no excessive urination, no excessive thirst, no hair loss or gain, no hot or cold intolerance, no menstrual period change or irregularity, no loss of sexual ability or drive, no erection difficulty, no nipple discharge  Hematologic/Lymphatic: a tendency for easy bruising  Neurological General: headache,-- lightheadedness,-- syncope,-- trauma,-- increased sleepiness-- and-- waking up at night  Neurological Mental Status: confusion-- and-- memory problems  Neurological Cranial Nerves: blurry or double vision-- and-- vertigo or dizziness  Neurological Motor findings include:  no tremor, no twitching, no cramping(pre/post exercise), no atrophy    Neurological Coordination: balance difficulties-- and-- clumsiness  Neurological Sensory: tingling  Neurological Gait: difficulty walking  ROS Reviewed:   ROS reviewed  Active Problems    1  Abnormal brain MRI (793 0) (R90 89)  2  Back muscle spasm (724 8) (M62 830)  3  Cervical adenopathy (785 6) (R59 0)  4  Cervical strain (847 0) (S16 1XXA)  5  Cervicalgia (723 1) (M54 2)  6  Chronic sinusitis (473 9) (J32 9)  7  Ecchymosis (459 89) (R58)  8  Episodic tension-type headache, not intractable (339 11) (G44 219)  9  ETD (eustachian tube dysfunction) (381 81) (H69 80)  10  Facet arthropathy, lumbar (721 3) (M12 88)  11  Glands swollen (785 6) (R59 9)  12  Hemangioma of spine (228 09) (D18 09)  13  Hypercholesterolemia (272 0) (E78 00)  14  Hypertension (401 9) (I10)  15  Lesion of lumbar spine (733 90) (M89 9)  16  Low back pain syndrome (724 2) (M54 5)  17  Low back pain with sciatica (724 3) (M54 40)  18  Lumbar strain (847 2) (S39 012A)  19  Myofascial pain (729 1) (M79 1)  20  Palpitations (785 1) (R00 2)  21  Sacroiliac joint dysfunction (724 6) (M53 3)  22  Seasonal allergies (477 9) (J30 2)  23  Strain of thoracic region (847 1) (S29 019A)  24  Subluxation or dislocation extensor carpi ulnaris tendon, left, initial encounter (833 09)  (S63 095A)  25  Tear of lunotriquetral ligament, left, initial encounter (842 01) (S63 592A)  26  Thoracic back pain (724 1) (M54 6)  27  Tick bite (919 4,E906 4) (W57 XXXA)  28  Trigger thumb of left hand (727 03) (M65 312)  29  Trochanteric bursitis (726 5) (M70 60)    Past Medical History  1  History of Arthralgia Of The Ulna / Radius / Wrist (719 43)  2  History of Asthma (493 90) (J45 909)  3  History of acute bronchitis (V12 69) (Z87 09)  4  History of sinus bradycardia (V12 59) (Z86 79)  5  History of Lump of skin (782 2) (R22 9)  6  History of Other synovitis or tenosynovitis of forearm (727 09) (M65 839)  7   History of Poisoning By Carbon Monoxide (986)  Active Problems And Past Medical History Reviewed: The active problems and past medical history were reviewed and updated today  Surgical History  1  History of Appendectomy  2  History of Breast Lumpectomy Lesion No   3  History of Tonsillectomy    Family History  Mother   1  Family history of Congestive heart failure  Father   2  Family history of Heart attack  Brother   3  Family history of hypertension (V17 49) (Z82 49)  Aunt   4  Family history of diabetes mellitus (V18 0) (Z83 3)  5  Family history of Stroke  Family History   6  Family history of arthritis (V17 7) (Z82 61)  Family History Reviewed: The family history was reviewed and updated today  Social History     · Being A Social Drinker   · Caffeine Use   · Exercises, 6x week   · Former smoker (S79 33) (L08 277)   ·    · No drug use  Social History Reviewed: The social history was reviewed and updated today  Current Meds  1  Aspirin 81 MG CAPS; take 1 capsule daily; Therapy: (Recorded:2016) to Recorded  2  Butalbital-APAP-Caffeine -40 MG Oral Capsule; TAKE 1 CAPSULE BY MOUTH EVERY 8 HOURS AS NEEDED FOR HEAD PAIN; Therapy: [id number] to (Evaluate:2017)  Requested for: 58Usx4529; Last Rx:[id number] Ordered  3  Gabapentin 100 MG Oral Capsule; TAKE 1 CAPSULE IN AM , 1 CAPSULE AT NOON  AND 2 CAPSULES AT NIGHT  Requested for: 81NPJ2011; Last [id number] Ordered  4  Multiple Vitamins TABS; Therapy: (Recorded:2016) to Recorded  5  Zyrtec 10 MG TABS; Take 1 tablet twice daily; Therapy: (Recorded:2016) to Recorded    Allergies    1  Erythromycin Base TABS  2  Erythromycin GEL  3  Penicillins  4   Vibramycin CAPS    Vitals  Signs   Recorded:  07:57AM   Respiration: 18  Systolic: 838  Diastolic: 78  Height: 5 ft 5 in  Weight: 153 lb   BMI Calculated: 25 46  BSA Calculated: 1 77    Physical Exam   Constitutional  General Appearance: Appears appropriate for age, healthy, well developed, appropriately groomed and appropriately dressed   Eyes Ophthalmoscopic examination: Vision is grossly normal  Gross visual field testing by confrontation shows no abnormalities  EOMI in both eyes  Conjunctivae clear  Eyelids normal palpebral fissures equal  Orbits exhibit normal position  No discharge from the eyes  PERRL  External inspection of ears and nose: Normal      Neck  Neck and thyroid: Normal to inspection and palpation  Pulmonary  Respiratory effort: Lungs are clear bilaterally  Cardiovascular  Auscultation of heart: Rate is regular  Rhythm is regular  Peripheral vascular exam: Normal pulses throughout, no tenderness, erythema or swelling  Musculoskeletal  Gait and Station: Walks with normal gait  Tandem walk test is normal  Romberg's test is negative  Muscle strength: Normal strength throughout  Muscle tone: No atrophy, abnormal movements, flaccidity, cogwheeling or spasticity  Range of motion: Normal     Stability: Normal      Neurologic  Orientation to person, place, and time: Normal    Attention span and concentration: Normal thought process and attention span  Language: Names objects, able to repeat phrases and speaks spontaneously  Fund of knowledge: Normal vocabulary with appropriate knowledge of current events and past history  Sensation: Intact sensation to pinprick, temperature, vibration, and proprioception in all four extremities  Reflexes: DTR's are normal and symmetric bilaterally  Babinski's reflex is negative bilaterally  No pathologic ankle clonus  Coordination: Cerebellum function intact  No involuntary movement or psychomotor activity  Motor System: No pronator drift  Upper Extremities: Normal to inspection  No tenderness over the upper extremities bilaterally  No instability bilaterally  Strength: Motor strength is 5/5 bilaterally  Normal muscle tone bilaterally  Muscle bulk: Muscle bulk is normal bilaterally  Full ROM bilaterally  Lower Extremities: Normal to inspection and palpation   No tenderness of the lower extremities bilaterally  Exhibits no instability bilaterally  Strength: Motor strength is 5/5 bilaterally  Normal muscle tone bilaterally  Muscle Bulk: Muscle bulk is normal bilaterally  Full ROM bilaterally  Cranial Nerve Exam  II: Normal with no deficit  III,IV, VI: Normal with no deficit  V: Normal with no deficit  VII: Normal with no deficit  VIII: Normal with no deficit  IX: Normal with no deficit  X: Normal with no deficit  XI: Normal with no deficit  XII: Normal with no deficit  Recent and remote memory: Intact      Mood and affect: Normal        Future Appointments    Date/Time Provider Specialty Site   01/15/2018 03:00 PM Rubi Cavazos MD Neurology [facility] [id number]       Signatures   Electronically signed by : Paulette Burnham MD; Nov 13 2017  1:27PM EST                       (Author)    Electronically signed by : Paulette Burnham MD; Nov 13 2017  1:30PM EST                       (Author)

## 2017-11-20 ENCOUNTER — APPOINTMENT (OUTPATIENT)
Dept: LAB | Facility: MEDICAL CENTER | Age: 52
End: 2017-11-20
Payer: COMMERCIAL

## 2017-11-20 DIAGNOSIS — G44.219 EPISODIC TENSION-TYPE HEADACHE, NOT INTRACTABLE: ICD-10-CM

## 2017-11-20 DIAGNOSIS — T58.8X1A TOXIC EFFECT OF CARBON MONOXIDE FROM OTHER SOURCE, ACCIDENTAL (UNINTENTIONAL), INITIAL ENCOUNTER: ICD-10-CM
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
## 2017-11-20 DIAGNOSIS — M62.830 MUSCLE SPASM OF BACK: ICD-10-CM

## 2017-11-20 LAB
ESTRADIOL SERPL-MCNC: <11 PG/ML
FSH SERPL-ACNC: 74 MIU/ML
LH SERPL-ACNC: 33.3 MIU/ML

## 2017-11-20 PROCEDURE — 83001 ASSAY OF GONADOTROPIN (FSH): CPT

## 2017-11-20 PROCEDURE — 82670 ASSAY OF TOTAL ESTRADIOL: CPT

## 2017-11-20 PROCEDURE — 83002 ASSAY OF GONADOTROPIN (LH): CPT

## 2017-11-20 PROCEDURE — 36415 COLL VENOUS BLD VENIPUNCTURE: CPT

## 2017-11-22 ENCOUNTER — GENERIC CONVERSION - ENCOUNTER (OUTPATIENT)
Dept: OTHER | Facility: OTHER | Age: 52
End: 2017-11-22

## 2017-11-27 ENCOUNTER — GENERIC CONVERSION - ENCOUNTER (OUTPATIENT)
Dept: OTHER | Facility: OTHER | Age: 52
End: 2017-11-27

## 2017-11-30 ENCOUNTER — HOSPITAL ENCOUNTER (OUTPATIENT)
Dept: RADIOLOGY | Facility: MEDICAL CENTER | Age: 52
Discharge: HOME | End: 2017-11-30
Payer: COMMERCIAL

## 2017-11-30 DIAGNOSIS — T58.8X1A TOXIC EFFECT OF CARBON MONOXIDE FROM OTHER SOURCE, ACCIDENTAL (UNINTENTIONAL), INITIAL ENCOUNTER: ICD-10-CM

## 2017-11-30 PROCEDURE — 70496 CT ANGIOGRAPHY HEAD: CPT

## 2017-11-30 PROCEDURE — 70498 CT ANGIOGRAPHY NECK: CPT

## 2017-11-30 RX ADMIN — IOHEXOL 100 ML: 350 INJECTION, SOLUTION INTRAVENOUS at 18:04
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
## 2017-12-18 DIAGNOSIS — M62.830 MUSCLE SPASM OF BACK: ICD-10-CM

## 2018-01-09 NOTE — RESULT NOTES
Discussion/Summary   Lyme still does not meet positive criteria, but there are more bands positive- at this point, I want to repeat it again in 8 weeks to make sure it gets better and not worse- if worse I would have you see Infectious disease        Verified Results  (1) LYME ANTIBODY PROFILE [facility] TO WESTERN BLOT [id number] 10:42AM Amalialogan Barragan   LUIS Order Number: [id number]     Test Name Result Flag Reference   LYME IGG 0 17  0 00-0 79   NEGATIVE(0 00-0 79)-Absence of detectable Borrelia IgG Antibodies  A negative result does not exclude the possibility of Borrelia infection  If early Lyme disease is suspected,a second sample should be collected & tested 4 weeks after initial testing  LYME IGM 0 84 H 0 00-0 79   EQUIVOCAL (0 80-1 19) - Current testing guidelines recommend that all equivocal samples be supplemented by further testing  Sample forwarded to reference lab for Western blot assay  LYME 18 KD IGG Absent     LYME 23 KD IGG Absent     LYME 28 KD IGG Absent     LYME 30 KD IGG Present A    LYME 39 KD IGG Absent     LYME 39 KD IGM Absent     LYME 41 KD IGG Present A    LYME 45 KD IGG Absent     LYME 58 KD IGG Absent     LYME 66 KD IGG Absent     LYME 93 KD IGG Absent     LYME 23 KD IGM Absent     LYME 41 KD IGM Present A    LYME IGG WB INTERP  Negative     Positive: 5 of the following                                 Borrelia-specific bands:                                 18,23,28,30,39,41,45,58,                                 66, and 93  Negative: No bands or banding                                 patterns which do not                                 meet positive criteria  LYME IGM WB INTERP  Negative     Note: An equivocal or positive EIA result followed by a negative  Western Blot result is considered NEGATIVE  An equivocal or positive  EIA result followed by a positive Western Blot is considered POSITIVE  by the CDC    Positive: 2 of the following bands: 23,39 or 41  Negative: No bands or banding patterns which do not meet positive  criteria  Criteria for positivity are those recommended by CDC/ASTPHLD   p23=Osp C, k39=iapffbajg  Note:  Sera from individuals with the following may cross react in the  Lyme Western Blot assays: other spirochetal diseases (periodontal  disease, leptospirosis, relapsing fever, yaws, and pinta);  connective autoimmune (Rheumatoid Arthritis and Systemic Lupus  Erythematosus and also individuals with Antinuclear Antibody);  other infections COFFEE [facility] Spotted Fever; Aurora-Barr Virus,  and Cytomegalovirus)  Performed at:  [address] [place] [address]  [id number]  : Angelic Lyn MD, Phone:  [phone]       Plan  Back muscle spasm, Tick bite    · (1) LYME ANTIBODY PROFILE W/REFLEX TO WESTERN BLOT; Status:Active;   Requested for:[id number];

## 2018-01-10 NOTE — MISCELLANEOUS
Message  Return to work or school:   Patricia Ash is under my professional care  She was seen in my office on 4/20/16     She is able to work with limitations (Limit lifting, carrying, pushing, pulling to 50 lbs)           Signatures   Electronically signed by : Anjel Linares MD; Apr 20 2016  4:52PM EST                       (Author)

## 2018-01-10 NOTE — CONSULTS
Assessment    1  Seasonal allergies (477 9) (J30 2)   2  Palpitations (785 1) (R00 2)   3  Hypertension (401 9) (I10)   4  History of sinus bradycardia (V12 59) (Z86 79)    Plan  Hypertension    · (1) CBC/ PLT (NO DIFF); Status:Unauthorized - Requires Authorization; Requested  for:10Oct2016;    Perform:[facility]; Due:10Oct2017; Last Updated By:Christopher Sullivan; 10/10/2016 9:02:45 AM;Ordered;  For:Hypertension; Ordered By:Christopher Sullivan;   · (1) LIPID PANEL, FASTING; Status:Unauthorized - Requires Authorization; Requested  for:10Oct2016;    Perform:[facility]; Due:10Oct2017; Last Updated By:Christopher Sullivan; 10/10/2016 9:02:45 AM;Ordered;  For:Hypertension; Ordered By:Christopehr Sullivan;   · (1) TSH WITH FT4 REFLEX; Status:Unauthorized - Requires Authorization; Requested  for:10Oct2016;    Perform:[facility]; Due:10Oct2017; Last Updated By:Christopher Sullivan; 10/10/2016 9:02:45 AM;Ordered;  For:Hypertension; Ordered By:Christopher Sullivan;  Palpitations    · (1) COMPREHENSIVE METABOLIC PANEL; Status:Unauthorized - Requires  Authorization; Requested for:10Oct2016;    Perform:[facility]; Due:10Oct2017; Last Updated By:Juan Sullivan; 10/10/2016 9:02:45 AM;Ordered; For:Palpitations; Ordered By:Christopher Sullivan;   · EKG/ECG- POC; Status:Complete;   Done: [id number]   Perform: In Office; Due:10Oct2017; Last Updated By:Anna Tan; 10/10/2016 8:13:39 AM;Ordered; For:Palpitations; Ordered By:Roni Arora;   · HOLTER MONITOR - 48 HOUR; Status:Unauthorized - Requires Authorization; Requested for:10Oct2016;    Perform:[facility]; Due:10Oct2017; Last Updated Mariannaangel Appiah; 10/10/2016 9:10:54 AM;Ordered; For:Palpitations; Ordered By:Christopher Sullivan;    Discussion/Summary    49 y/o F with FHx of HTN p/f evaluation of HTN and palpitations  HTN: Her BP is mildly elevated today  She reports some menopausal symptoms  Her BP could be 2/2 to pain from headache   However, she also notes some fatigue  We will check basic labs and have her keep a BP log x 2 weeks  Based on this, we will determine further testing  We also discussed that wrist BP cuffs are not generally as accurate as arm cuffs  --Check BP TID x 2 weeks and with HA  --Check CBC, CMP, TSH    Palpitations: Unclear etiology  ? PVCs vs SVT vs noncardiac (anxiety)  --Holter monitor    F/U in 2 weeks  Chief Complaint  NPE HTN and PALPS      History of Present Illness  Cardiology HPI Free Text Note Form [facility]: 49 y/o female states she has been getting severe headaches, with burning "face" over the last 1 5 months  Headaches seem to occur in the morning  She has started checking her BP at home using a wrist cuff and noticed that it increases in the late afternoon to the evening  As high as 170s/90s  The headaches occur every morning and not always associated with the BP elevation  She states she is unsure if she is going through menopause as 4 years ago she was getting night sweats  Then developed hot flashes for 2 years, then stopped  Now over the last couple of months she has developed hot flashes which coincide with the headaches  She reports occasional chest ache when she lays on her back  No symptoms with walking  Laying on her side is no problem  She denies orthopnea, PND, or LE edema  She also denies presyncope or syncope  She notes sinus bradycardia with ectopic beats  Mild Asthma    Works as a   Review of Systems      Cardiac: chest pain and palpitations present , but no rhythm problems, no fainting/blackouts, no heart murmur present and no signs of swelling  Skin: No complaints of nonhealing sores or skin rash     Genitourinary: No complaints of recurrent urinary tract infections, frequent urination at night, difficult urination, blood in urine, kidney stones, loss of bladder control, kidney problems, denies any birth control or hormone replacement, is not post menopausal, not currently pregnant  Psychological: No complaints of feeling depressed, anxiety, panic attacks, or difficulty concentrating  General: lack of energy/fatigue, but no night sweats   Hot flashes  Respiratory: No complaints of shortness of breath, cough with sputum, or wheezing  HEENT: No complaints of serious problems, hearing problems, nose problems, throat problems, or snoring  Gastrointestinal: No complaints of liver problems, nausea, vomiting, heartburn, constipation, bloody stools, diarrhea, problems swallowing, adbominal pain, or rectal bleeding  Hematologic: No complaints of bleeding disorders, anemia, blood clots, or excessive brusing  Neurological: No complaints of numbness, tingling, dizziness, weakness, seizures, headaches, syncope or fainting, AM fatigue, daytime sleepiness, no witnessed apnea episodes  Musculoskeletal: No complaints of arthritis, back pain, or painfull swelling  ROS reviewed  Active Problems    1  Acute serous otitis media, unspecified laterality   2  Back muscle spasm (724 8) (M62 830)   3  Cervical adenopathy (785 6) (R59 0)   4  Cervical strain (847 0) (S16 1XXA)   5  Cervicalgia (723 1) (M54 2)   6  Chronic sinusitis (473 9) (J32 9)   7  Ecchymosis (459 89) (R58)   8  ETD (eustachian tube dysfunction) (381 81) (H69 80)   9  Facet arthropathy, lumbar (721 3) (M12 88)   10  Glands swollen (785 6) (R59 9)   11  Hemangioma of spine (228 09) (D18 09)   12  Hypertension (401 9) (I10)   13  Left wrist pain (719 43) (M25 532)   14  Lesion of lumbar spine (733 90) (M89 9)   15  Low back pain syndrome (724 2) (M54 5)   16  Low back pain with sciatica (724 3) (M54 40)   17  Lumbar strain (847 2) (S39 012A)   18  Myofascial pain (729 1) (M79 1)   19  Palpitations (785 1) (R00 2)   20  Sacroiliac joint dysfunction (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 6) (M53 3)   21  Seasonal allergies (477 9) (J30 2)   22  Strain of thoracic region (847 1) (S29 019A)   23   Subluxation or dislocation extensor carpi ulnaris tendon, left, initial encounter (833 09)    (S63 095A)   24  Tear of lunotriquetral ligament, left, initial encounter (842 01) (S63 592A)   25  Thoracic back pain (724 1) (M54 6)   26  Trigger thumb of left hand (727 03) (M65 312)   27  Trochanteric bursitis (726 5) (M70 60)   28  Vaginitis (616 10) (N76 0)    Past Medical History    · History of Arthralgia Of The Ulna / Radius / Wrist (719 43)   · History of Asthma (493 90) (J45 909)   · History of acute bronchitis (V12 69) (Z87 09)   · History of sinus bradycardia (V12 59) (Z86 79)   · History of Hypercholesterolemia (272 0) (E78 00)   · History of Lump of skin (782 2) (R22 9)   · History of Other synovitis or tenosynovitis of forearm (727 09) (M65 839)   · History of Poisoning By Carbon Monoxide (986)    The active problems and past medical history were reviewed and updated today  Surgical History    · History of Appendectomy   · History of Breast Lumpectomy Lesion No    · History of Tonsillectomy    The surgical history was reviewed and updated today  Family History  Mother    · Family history of Congestive heart failure  Father    · Family history of Heart attack  Aunt    · Family history of Stroke  Family History Reviewed: The family history was reviewed and updated today  Social History    · Being A Social Drinker   · Caffeine Use   · Former smoker (P83 59) (T60 736)  The social history was reviewed and updated today  The social history was reviewed and is unchanged  Current Meds   1  Aspirin 81 MG CAPS; take 1 capsule daily; Therapy: (Recorded:10Oct2016) to Recorded   2  Diclofenac-Misoprostol 75-0 2 MG Oral Tablet Delayed Release; TAKE 1 TABLET BY   MOUTH EVERY 12 HOURS AS NEEDED FOR PAIN;   Therapy: 20Apr2016 to (Evaluate:28Oct2016)  Requested for: 29Aug2016; Last   Rx:88Gpd9166 Ordered   3  DULoxetine HCl - 20 MG Oral Capsule Delayed Release Particles; TAKE 2 CAPSULES   BY MOUTH DAILY;    Therapy: 20Apr2016 to (Evaluate:93Nob6223) Requested for: 20Apr2016; Last   Rx:20Apr2016 Ordered   4  Multiple Vitamins TABS; Therapy: (Recorded:10Oct2016) to Recorded   5  Zyrtec 10 MG TABS; Take 1 tablet twice daily; Therapy: (Recorded:10Oct2016) to Recorded    The medication list was reviewed and updated today  Allergies    1  Erythromycin Base TABS   2  Erythromycin GEL   3  Penicillins   4  Vibramycin CAPS    Vitals  Signs    Systolic: 792, RUE, Sitting  Diastolic: 90, RUE, Sitting  Heart Rate: 63  Height: 5 ft 5 in  Weight: 163 lb 1 oz  BMI Calculated: 27 14  BSA Calculated: 1 81    Physical Exam    Constitutional   General appearance: No acute distress, well appearing and well nourished  Ears, Nose, Mouth, and Throat - Oropharynx: Clear, nares are clear, mucous membranes are moist    Neck   Neck and thyroid: Normal, supple, trachea midline, no thyromegaly  Pulmonary   Respiratory effort: No increased work of breathing or signs of respiratory distress  Auscultation of lungs: Clear to auscultation, no rales, no rhonchi, no wheezing, good air movement  Cardiovascular   Palpation of heart: Normal PMI, no thrills  Auscultation of heart: Normal rate and rhythm, normal S1 and S2, no murmurs  Pedal pulses: Normal, 2+ bilaterally  Examination of extremities for edema and/or varicosities: Normal     Chest -   Sternum: Normal     Abdomen   Abdomen: Non-tender and no distention  Liver and spleen: No hepatomegaly or splenomegaly  Musculoskeletal Gait and station: Normal gait  Skin - Skin and subcutaneous tissue: Normal without rashes or lesions  Skin is warm and well perfused, normal turgor  Neurologic - Cranial nerves: II - XII intact   Speech: Normal     Psychiatric - Orientation to person, place, and time: Normal  Mood and affect: Normal       Results/Data  [email or web address] bpm      Future Appointments    Date/Time Provider Specialty Site   10/18/2016 02:00 PM Cardiology, [address]   10/19/2016 03:45 PM Kimberlyn Landaverde MD Sports Medicine  [facility] [address]   10/25/2016 04:40 PM JUAN M Rosen , PhD Cardiology [facility]     End of Encounter Meds    1  Aspirin 81 MG CAPS; take 1 capsule daily; Therapy: (Recorded:10Oct2016) to Recorded    2  Diclofenac-Misoprostol 75-0 2 MG Oral Tablet Delayed Release; TAKE 1 TABLET BY   MOUTH EVERY 12 HOURS AS NEEDED FOR PAIN;   Therapy: 20Apr2016 to (Evaluate:28Oct2016)  Requested for: 17Krg8757; Last   Rx:20Szi5964 Ordered    3  DULoxetine HCl - 20 MG Oral Capsule Delayed Release Particles; TAKE 2 CAPSULES   BY MOUTH DAILY; Therapy: 20Apr2016 to (Evaluate:22Fso1952)  Requested for: 20Apr2016; Last   Rx:20Apr2016 Ordered    4  Multiple Vitamins TABS; Therapy: (Recorded:10Oct2016) to Recorded   5  Zyrtec 10 MG TABS; Take 1 tablet twice daily; Therapy: (Recorded:10Oct2016) to Recorded    Signatures   Electronically signed by : JUAN M Lozano PhD; Oct 10 2016 11:44AM EST                       (Author)

## 2018-01-10 NOTE — RESULT NOTES
Discussion/Summary   Rheumatologic labs are normal but lyme was equivocal - we are waiting for the final result  Verified Results  (1) KIERSTEN SCREEN W/REFLEX TO TITER/PATTERN [id number] 08:11AM [facility] Body Order Number: [id number]     Test Name Result Flag Reference   KIERSTEN SCREEN  Negative  Negative     (1) LYME ANTIBODY PROFILE [facility] TO WESTERN BLOT [id number] 08:11AM N[facility] Order Number: [id number]     Test Name Result Flag Reference   LYME IGG 0 14  0 00-0 79   NEGATIVE(0 00-0 79)-Absence of detectable Borrelia IgG Antibodies  A negative result does not exclude the possibility of Borrelia infection  If early Lyme disease is suspected,a second sample should be collected & tested 4 weeks after initial testing  LYME IGM 0 89 H 0 00-0 79   EQUIVOCAL (0 80-1 19) - Current testing guidelines recommend that all equivocal samples be supplemented by further testing  Sample forwarded to reference lab for Western blot assay       (1) RHEUMATOID FACTOR SCREEN [id number] 08:11AM [facility] Order Number: [id number]     Test Name Result Flag Reference   RHEUMATOID FACTOR Negative  Negative
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
## 2018-01-11 NOTE — RESULT NOTES
Verified Results  * MRI BRAIN WO CONTRAST [id number] 08:07PM Sidonie Matter     Test Name Result Flag Reference   MRI BRAIN WO CONTRAST (Report)     MRI BRAIN WITHOUT CONTRAST     INDICATION: Headache, nausea, ear pressure, dizziness     COMPARISON:  CT brain 11/13/2012     TECHNIQUE: Sagittal T1, axial T2, axial FLAIR, axial T1, axial T2* and axial diffusion imaging  IMAGE QUALITY: Diagnostic  FINDINGS:     BRAIN PARENCHYMA: No acute disease  There is no acute ischemia, intracranial mass, mass effect or edema  No hemosiderin deposition  Nonspecific parenchymal changes in the supratentorial white matter largely deep and subcortical in location both hemispheres are identified  These are entirely nonspecific and could reflect sequela, gated migraine, collagen vascular disease, precocious    small vessel disease, or Lyme disease  These are present to a mild degree  If etiology is not clinically apparent, LP could be performed as deemed clinically appropriate  VENTRICLES: The ventricles are normal in size and contour  SELLA AND PITUITARY GLAND: Normal      ORBITS: Normal      PARANASAL SINUSES: Normal      VASCULATURE: Evaluation of the major intracranial vasculature demonstrates appropriate flow voids  CALVARIUM AND SKULL BASE: Normal      EXTRACRANIAL SOFT TISSUES: Normal        IMPRESSION:     No acute disease  Scattered parenchymal changes, nonspecific  Please see recommendations above  ##sigslh##sigslh            Workstation performed: [id number]     Signed by:    Mitzy Padron MD   9/20/17

## 2018-01-12 NOTE — RESULT NOTES
Discussion/Summary   All hormones in menopausal range  Verified Results  (1) [facility] [id number] 12:50PM ODIN 148 Order Number: [id number]     Test Name Result Flag Reference   FOLLICLE STIMULATING HORMONE 74 0 mIU/mL     FSH:  Menstruating Females: Follicular Phase  7 6-44 7 mIU/mL    Mid-Cycle Phase   5 2-17 5 mIU/mL    Luteal Phase      1 7-9 5  mIU/mL  Postmenopausal Females    On Menopausal Hormone Therapy(HRT) 5 9-72 8   mIU/mL    Untreated                          12 7-132 2 mIU/mL     (1) LH (LEUTINIZING HORMONE) [id number] 12:50PM [place] [facility] Order Number: [id number]     Test Name Result Flag Reference   LUTEINIZING HORMONE 33 3 mIU/mL     LH:   Menstruating Females: Follicular GYDYC2 0-97  8mIU/mL    Mid-Cycle Phase 22 8-76 1 mIU/mL    Luteal Phase0 6-13  5mIU/mL   Postmenopausal Females: On Menopausal Hormone Therapy(MHT) 1 1-52 4 mIU/mL    Untreated8 6-61 8 mIU/mL     (1) ESTRADIOL [id number] 12:50PM ODIN 148 Order Number: [id number]     Test Name Result Flag Reference   ESTRADIOL <11 0 pg/mL     ESTRADIOL:    Mentruating Females: Follicular phase:  85 5-437 9  pg/mL      Mid-cycle phase:   49 9- 367 2 pg/mL      Luteal phase:      40 2-259    pg/mL     Postmenopausal females (untreated):  <11-58 3  pg/mL  On Menopausal Hormone Therapy (MHT): < 1 pg/mL    Women taking the drug Fulvestrant (Faslodex) may have falsely elevated Estradiol results  Suggest ordering [facility] Estradiol, LC/MS -test number [id number], to monitor these patients

## 2018-01-12 NOTE — PROGRESS NOTES
Assessment    1  Tear of lunotriquetral ligament, left, initial encounter (842 01) (Q14 898L)   2  Subluxation or dislocation extensor carpi ulnaris tendon, left, initial encounter (833 09)   (X13 953Y)    Plan  Left wrist pain, Subluxation or dislocation extensor carpi ulnaris tendon, left, initial  encounter, Tear of lunotriquetral ligament, left, initial encounter    · Follow-up visit in 6 weeks Evaluation and Treatment  Follow-up  Status: Hold For -  Scheduling  Requested for: 06FLG7189    Discussion/Summary    Kerry Dupree is doing well  She'll discontinue therapy and continue her exercises at home  She'll follow us in 6 weeks  A comfort cool splint was supplied today for her to wear in order to aid her thumb weakness while at work  We'll see her back in 6 weeks for reevaluation  She'll return to work next week  Chief Complaint    1  Wrist Pain  s/p left wrist arthroscopic debridement and LT pinning, ECU debridement and subsheath  stabilization on 10/30/15   s/p left wrist pin removal on 12/4/15      Post-Op  HPI: Kerry Dupree returns after undergoing removal of her pins, This is her second postop visit  She is doing very well, she complains of stiffness especially with wrist extension, but overall she is doing well  She was actually told yesterday from occupational therapy but she is having to go any more  She will continue her exercises and work with putty at home  She feels as though she is ready to return to work within the next week  She does complain of some thumb weakness which she is working on  Post-Op UE:   Left side, status post on 12/4/15   HPI: The patient reports swelling and stiffness, but no fevers, no chills, no numbness, no excessive pain and no nausea  PE: The surgical incision site was clean, dry and intact  The surgical incision site demonstrates no warmth, no induration, no erythema, no ecchymosis and swelling  ROM is as expected  Full composite fist  Full wrist flexion   Full wrist extention  Capillary refill is < 2 seconds Peripheral neurovascular exam reveals sensation intact and motor intact  Assessment: Post-op, the patient is doing well, has excellent pain control and no signs of infection  Preoperative symptoms improved  Plan: Activity Restrictions: advance as tolerated  Done this visit: remove sutures/staples  Follow up: 4 weeks  Review of Systems    Constitutional: No fever, no chills, feels well, no tiredness, no recent weight gain or loss  Eyes: No complaints of eyesight problems, no red eyes  ENT: no loss of hearing, no nosebleeds, no sore throat  Cardiovascular: No complaints of chest pain, no palpitations, no leg claudication or lower extremity edema  Respiratory: no compliants of shortness of breath, no wheezing, no cough  Gastrointestinal: no complaints of abdominal pain, no constipation, no nausea or diarrhea, no vomiting, no bloody stools  Genitourinary: no complaints of dysuria, no incontinence  Musculoskeletal: as noted in HPI  Integumentary: no complaints of skin rash or lesion, no itching or dry skin, no skin wounds  Neurological: no complaints of headache, no confusion, no numbness or tingling, no dizziness  Endocrine: No complaints of muscle weakness, no feelings of weakness, no frequent urination, no excessive thirst    Psychiatric: No suicidal thoughts, no anxiety, no feelings of depression  ROS reviewed  Active Problems    1  Acute serous otitis media, unspecified laterality   2  Back muscle spasm (724 8) (M62 830)   3  Cervical adenopathy (785 6) (R59 0)   4  Cervical strain (847 0) (S16 1XXA)   5  Cervicalgia (723 1) (M54 2)   6  Chronic sinusitis (473 9) (J32 9)   7  Ecchymosis (459 89) (R58)   8  ETD (eustachian tube dysfunction) (381 81) (H69 80)   9  Facet arthropathy, lumbar (721 3) (M47 816)   10  Glands swollen (785 6) (R59 1)   11  Hemangioma of spine (228 09) (D18 09)   12   Left wrist pain (719 43) (M25 532) 13  Lesion of lumbar spine (733 90) (M89 9)   14  Low back pain syndrome (724 2) (M54 5)   15  Low back pain with sciatica (724 3) (M54 40)   16  Lumbar strain (847 2) (S39 012A)   17  Myofascial pain (729 1) (M79 1)   18  Sacroiliac joint dysfunction (724 6) (M53 3)   19  Strain of thoracic region (847 1) (S29 012A)   20  Subluxation or dislocation extensor carpi ulnaris tendon, left, initial encounter (833 09)    (S63 095A)   21  Tear of lunotriquetral ligament, left, initial encounter (842 01) (S63 592A)   22  Thoracic back pain (724 1) (M54 6)   23  Trochanteric bursitis (726 5) (M70 60)   24  Vaginitis (616 10) (N76 0)    Social History    · Being A Social Drinker   · Caffeine Use   · Former smoker (M90 44) (L47 057)  The social history was reviewed and updated today  The social history was reviewed and is unchanged  Current Meds   1  DULoxetine HCl - 20 MG Oral Capsule Delayed Release Particles; TAKE 2 CAPSULES   BY MOUTH DAILY; Therapy: 82RZC1853 to (Evaluate:11Zry4200)  Requested for: 97ATM8324; Last   Rx:09Nov2015 Ordered    The medication list was reviewed and updated today  Allergies    1  Erythromycin Base TABS   2  Erythromycin GEL   3  Penicillins   4  Vibramycin CAPS    Vitals   Recorded: 88DCW5119 10:50AM   Heart Rate 56   Systolic 647   Diastolic 78   Height 5 ft 5 in   Weight 171 lb 2 08 oz   BMI Calculated 28 48   BSA Calculated 1 85     Physical Exam   Physical exam left wrist: Skin is intact  Incisions are well-healed  5 out of 5 APB strength  Nontender to palpation over the scapholunate interval  The patient is able flex and extend her wrist with almost full range of motion  Sensation intact to light touch  Brisk capillary refill     Constitutional - General appearance: Normal    Musculoskeletal - Gait and station: Normal    Psychiatric - Orientation to person, place, and time: Normal  Mood and affect: Normal    Eyes   Conjunctiva and lids: Normal        Message  Return to work or school:   Adal Pearson is under my professional care  She was seen in my office on 1/14/16   She is able to return to work on  1/21/16       Newton Juarez PA-C  Future Appointments    Date/Time Provider Specialty Site   02/25/2016 03:15 PM JUAN M Huggins   Orthopedic Surgery [facility] ORTHO SPECIALIST [place]   01/20/2016 03:00 PM Luis Mccracken MD Sports Medicine [address]     Signatures   Electronically signed by : Newton Juarez, [facility]; Jan 14 2016 11:11AM EST                       (Author)    Electronically signed by : JUAN M Hernandez ; Jan 14 2016  6:20PM EST                       (Author)

## 2018-01-13 VITALS
DIASTOLIC BLOOD PRESSURE: 84 MMHG | SYSTOLIC BLOOD PRESSURE: 144 MMHG | WEIGHT: 153.56 LBS | HEIGHT: 65 IN | HEART RATE: 59 BPM | BODY MASS INDEX: 25.58 KG/M2

## 2018-01-13 VITALS
DIASTOLIC BLOOD PRESSURE: 78 MMHG | WEIGHT: 153 LBS | RESPIRATION RATE: 18 BRPM | SYSTOLIC BLOOD PRESSURE: 140 MMHG | HEIGHT: 65 IN | BODY MASS INDEX: 25.49 KG/M2

## 2018-01-13 NOTE — RESULT NOTES
Verified Results  (1) LYME ANTIBODY PROFILE [facility] TO WESTERN BLOT 82EIS0178 10:42AM Vannesa Fortunato    Order Number: [id number]     Test Name Result Flag Reference   LYME IGG 0 17  0 00-0 79   NEGATIVE(0 00-0 79)-Absence of detectable Borrelia IgG Antibodies  A negative result does not exclude the possibility of Borrelia infection  If early Lyme disease is suspected,a second sample should be collected & tested 4 weeks after initial testing  LYME IGM 0 84 H 0 00-0 79   EQUIVOCAL (0 80-1 19) - Current testing guidelines recommend that all equivocal samples be supplemented by further testing  Sample forwarded to reference lab for Western blot assay

## 2018-01-15 VITALS
BODY MASS INDEX: 25.83 KG/M2 | HEART RATE: 68 BPM | RESPIRATION RATE: 16 BRPM | DIASTOLIC BLOOD PRESSURE: 74 MMHG | HEIGHT: 65 IN | SYSTOLIC BLOOD PRESSURE: 118 MMHG | TEMPERATURE: 98.5 F | WEIGHT: 155 LBS

## 2018-01-16 NOTE — MISCELLANEOUS
Message  Return to work or school:   Ronald Curry is under my professional care  She was seen in my office on 1/14/16   She is able to return to work on  1/21/16       Asa Fitzgerald PA-C        Signatures   Electronically signed by : Asa Fitzgerald, [facility]; Jan 14 2016 11:11AM EST                       (Author)    Electronically signed by : JUAN M Farias ; Jan 14 2016  6:20PM EST                       (Author)

## 2018-01-16 NOTE — RESULT NOTES
Discussion/Summary   Labs are all very good  Verified Results  (1) CBC/PLT/DIFF 84YJN6201 08:11AM Venkat Jones    Order Number: [id number]     Test Name Result Flag Reference   WBC COUNT 4 82 Thousand/uL  4 31-10 16   RBC COUNT 4 19 Million/uL  3 81-5 12   HEMOGLOBIN 12 9 g/dL  11 5-15 4   HEMATOCRIT 39 1 %  34 8-46  1   MCV 93 fL  82-98   MCH 30 8 pg  26 8-34 3   MCHC 33 0 g/dL  31 4-37 4   RDW 13 3 %  11 6-15 1   MPV 10 8 fL  8 9-12 7   PLATELET COUNT 589 Thousands/uL  149-390   nRBC AUTOMATED 0 /100 WBCs     NEUTROPHILS RELATIVE PERCENT 44 %  43-75   LYMPHOCYTES RELATIVE PERCENT 45 % H 14-44   MONOCYTES RELATIVE PERCENT 8 %  4-12   EOSINOPHILS RELATIVE PERCENT 2 %  0-6   BASOPHILS RELATIVE PERCENT 1 %  0-1   NEUTROPHILS ABSOLUTE COUNT 2 12 Thousands/? ??L  1 85-7 62   LYMPHOCYTES ABSOLUTE COUNT 2 19 Thousands/? ??L  0 60-4 47   MONOCYTES ABSOLUTE COUNT 0 38 Thousand/? ??L  0 17-1 22   EOSINOPHILS ABSOLUTE COUNT 0 08 Thousand/? ??L  0 00-0 61   BASOPHILS ABSOLUTE COUNT 0 04 Thousands/? ??L  0 00-0 10     (1) COMPREHENSIVE METABOLIC PANEL 33FRB8168 12:27MB Williamdaisy Motae Order Number: [id number]     Test Name Result Flag Reference   SODIUM 141 mmol/L  136-145   POTASSIUM 3 8 mmol/L  3 5-5 3   CHLORIDE 107 mmol/L  100-108   CARBON DIOXIDE 28 mmol/L  21-32   ANION GAP (CALC) 6 mmol/L  4-13   BLOOD UREA NITROGEN 16 mg/dL  5-25   CREATININE 0 89 mg/dL  0 60-1 30   Standardized to IDMS reference method   CALCIUM 9 1 mg/dL  8 3-10 1   BILI, TOTAL 0 54 mg/dL  0 20-1 00   ALK PHOSPHATAS 58 U/L     ALT (SGPT) 24 U/L  12-78   Specimen collection should occur prior to Sulfasalazine and/or Sulfapyridine administration due to the potential for falsely depressed results  AST(SGOT) 19 U/L  5-45   Specimen collection should occur prior to Sulfasalazine administration due to the potential for falsely depressed results     ALBUMIN 4 1 g/dL  3 5-5 0   TOTAL PROTEIN 7 5 g/dL  6 4-8 2 eGFR 75 ml/min/1 73sq m     [facility] Disease Education Program recommendations are as follows:  GFR calculation is accurate only with a steady state creatinine  Chronic Kidney disease less than 60 ml/min/1 73 sq  meters  Kidney failure less than 15 ml/min/1 73 sq  meters  GLUCOSE FASTING 89 mg/dL  65-99   Specimen collection should occur prior to Sulfasalazine administration due to the potential for falsely depressed results  Specimen collection should occur prior to Sulfapyridine administration due to the potential for falsely elevated results  (1) LIPID PANEL, FASTING 16Sep2017 08:11AM Sina Smith Order Number: [id number]     Test Name Result Flag Reference   CHOLESTEROL 241 mg/dL H    HDL,DIRECT 92 mg/dL H 40-60   Specimen collection should occur prior to Metamizole administration due to the potential for falsley depressed results  LDL CHOLESTEROL CALCULATED 134 mg/dL H 0-100   Triglyceride:        Normal <150 mg/dl   Borderline High 150-199 mg/dl   High 200-499 mg/dl   Very High >499 mg/dl      Cholesterol:       Desirable <200 mg/dl    Borderline High 200-239 mg/dl    High >239 mg/dl      HDL Cholesterol:       High>59 mg/dL    Low <41 mg/dL      This screening LDL is a calculated result  It does not have the accuracy of the Direct Measured LDL in the monitoring of patients with hyperlipidemia and/or statin therapy  Direct Measure LDL (IBP443) must be ordered separately in these patients  TRIGLYCERIDES 76 mg/dL  <=150   Specimen collection should occur prior to N-Acetylcysteine or Metamizole administration due to the potential for falsely depressed results  (1) TSH 16Sep2017 08:11AM Abner Eye    Order Number: [id number]     Test Name Result Flag Reference   TSH 2 290 uIU/mL  0 358-3 740   Patients undergoing fluorescein dye angiography may retain small amounts of fluorescein in the body for 48-72 hours post procedure   Samples containing fluorescein can produce falsely depressed TSH values  If the patient had this procedure,a specimen should be resubmitted post fluorescein clearance  The recommended reference ranges for TSH during pregnancy are as follows:  First trimester 0 1 to 2 5 uIU/mL  Second trimester  0 2 to 3 0 uIU/mL  Third trimester 0 3 to 3 0 uIU/m     (1) SED RATE 25Wmd2017 08:11AM [facility] Order Number: [id number]     Test Name Result Flag Reference   SED RATE 8 mm/hour  0-20     (1) VITAMIN D 25-HYDROXY 64Gyz6360 08:11AM [facility] Number: [id number]     Test Name Result Flag Reference   VIT D 25-HYDROX 31 8 ng/mL  30 0-100 0   This assay is a certified procedure of the CDC Vitamin D Standardization Certification Program (VDSCP)     Deficiency <20ng/ml   Insufficiency 20-30ng/ml   Sufficient  ng/ml     *Patients undergoing fluorescein dye angiography may retain small amounts of fluorescein in the body for 48-72 hours post procedure  Samples containing fluorescein can produce falsely elevated Vitamin D values  If the patient had this procedure, a specimen should be resubmitted post fluorescein clearance

## 2018-01-17 NOTE — PROGRESS NOTES
Assessment    1  Left wrist pain (880 06) (J19 042)    Plan  Left wrist pain    · Follow Up After Tests Complete Evaluation and Treatment  Follow-up  Status: Hold For -  Scheduling  Requested for: 76PCV1156   · * MRI WRIST LEFT WO CONTRAST; Status:Need Information - Financial Authorization; Requested for:50Pfz4129;     Discussion/Summary    Ongoing left wrist pain after a mechanical fall 12/4/16 despite conservative measures including bracing and NSAIDs    MRI left wrist for further evaluation  Continue brace and NSAIDs  Follow-up after MRI  Chief Complaint    1  Wrist Pain  Left wrist injury      History of Present Illness  HPI: Wing Hurley is a 49-year-old female who reports to the office today for evaluation of her left wrist  She sustained a fall after tripping over a parking median on 12/4/16  She fell with an outstretched left hand and jammed her small finger  She noted significant pain in the ulnar aspect of her wrist and MCP joint of the small finger  She reported to the emergency department where x-rays were taken  She does have a history of left wrist arthroscopic debridement and LT pinning, ECU debridement and subsheath stabilization on 10/30/15  She has been utilizing her wrist brace and been utilizing NSAIDs as well  She has persistent pain  She denies numbness and tingling  She has occasional swelling and stiffness  Review of Systems    Constitutional: No fever, no chills, feels well, no tiredness, no recent weight gain or loss  Eyes: No complaints of eyesight problems, no red eyes  ENT: no loss of hearing, no nosebleeds, no sore throat  Cardiovascular: No complaints of chest pain, no palpitations, no leg claudication or lower extremity edema  Respiratory: no compliants of shortness of breath, no wheezing, no cough  Gastrointestinal: no complaints of abdominal pain, no constipation, no nausea or diarrhea, no vomiting, no bloody stools     Genitourinary: no complaints of dysuria, no incontinence  Musculoskeletal: arthralgias, joint swelling and myalgias, but as noted in HPI, no limb pain, no joint stiffness and no limb swelling  Integumentary: no complaints of skin rash or lesion, no itching or dry skin, no skin wounds  Neurological: no complaints of headache, no confusion, no numbness or tingling, no dizziness, no numbness and no tingling  Endocrine: No complaints of muscle weakness, no feelings of weakness, no frequent urination, no excessive thirst    Psychiatric: No suicidal thoughts, no anxiety, no feelings of depression  Active Problems    1  Acute serous otitis media, unspecified laterality   2  Back muscle spasm (724 8) (M62 830)   3  Cervical adenopathy (785 6) (R59 0)   4  Cervical strain (847 0) (S16 1XXA)   5  Cervicalgia (723 1) (M54 2)   6  Chronic sinusitis (473 9) (J32 9)   7  Ecchymosis (459 89) (R58)   8  ETD (eustachian tube dysfunction) (381 81) (H69 80)   9  Facet arthropathy, lumbar (721 3) (M12 88)   10  Glands swollen (785 6) (R59 9)   11  Hemangioma of spine (228 09) (D18 09)   12  Hypercholesterolemia (272 0) (E78 00)   13  Hypertension (401 9) (I10)   14  Left wrist pain (719 43) (M25 532)   15  Lesion of lumbar spine (733 90) (M89 9)   16  Low back pain syndrome (724 2) (M54 5)   17  Low back pain with sciatica (724 3) (M54 40)   18  Lumbar strain (847 2) (S39 012A)   19  Myofascial pain (729 1) (M79 1)   20  Palpitations (785 1) (R00 2)   21  Sacroiliac joint dysfunction (724 6) (M53 3)   22  Seasonal allergies (477 9) (J30 2)   23  Strain of thoracic region (847 1) (S29 019A)   24  Subluxation or dislocation extensor carpi ulnaris tendon, left, initial encounter (833 09)    (S63 095A)   25  Tear of lunotriquetral ligament, left, initial encounter (842 01) (S63 592A)   26  Thoracic back pain (724 1) (M54 6)   27  Trigger thumb of left hand (727 03) (M65 312)   28  Trochanteric bursitis (726 5) (M70 60)   29   Vaginitis (616 10) (N76 0)    Past Medical History    The active problems and past medical history were reviewed and updated today  Surgical History    The surgical history was reviewed and updated today  Family History    The family history was reviewed and updated today  Social History  The social history was reviewed and updated today  The social history was reviewed and is unchanged  Current Meds   1  Aspirin 81 MG CAPS; take 1 capsule daily; Therapy: (Recorded:10Oct2016) to Recorded   2  Atorvastatin Calcium 40 MG Oral Tablet; TAKE 1 TABLET AT BEDTIME; Therapy: 54PNP9284 to (Evaluate:21Mar2017)  Requested for: 22Nov2016; Last   Rx:21Nov2016 Ordered   3  Diclofenac-Misoprostol 75-0 2 MG Oral Tablet Delayed Release; TAKE 1 TABLET BY   MOUTH EVERY 12 HOURS AS NEEDED FOR PAIN;   Therapy: 20Apr2016 to (Evaluate:01Jan2017)  Requested for: 16MBL8146; Last   Rx:02Nov2016 Ordered   4  Diclofenac-Misoprostol 75-0 2 MG Oral Tablet Delayed Release; TAKE 1 TABLET BY   MOUTH EVERY 12 HOURS AS NEEDED FOR PAIN;   Therapy: 99IWE0030 to (Evaluate:26Mar2017)  Requested for: 40PWL1014; Last   Rx:25Jan2017 Ordered   5  DULoxetine HCl - 20 MG Oral Capsule Delayed Release Particles; TAKE 2 CAPSULES   BY MOUTH DAILY; Therapy: 20Apr2016 to (Evaluate:18Dow9605)  Requested for: 20Apr2016; Last   Rx:20Apr2016 Ordered   6  DULoxetine HCl - 20 MG Oral Capsule Delayed Release Particles; TAKE 2 CAPSULES   BY MOUTH DAILY; Therapy: 90DAX7133 to (Evaluate:22Fnu0371)  Requested for: 25Jan2017; Last   Rx:25Jan2017 Ordered   7  DULoxetine HCl - 20 MG Oral Capsule Delayed Release Particles; TAKE 2 CAPSULES   DAILY; Therapy: 84IRZ1885 to (Evaluate:19Apr2017)  Requested for: 73KPY7537; Last   Rx:19Jan2017 Ordered   8  Multiple Vitamins TABS; Therapy: (Recorded:10Oct2016) to Recorded   9  Zyrtec 10 MG TABS; Take 1 tablet twice daily; Therapy: (Recorded:10Oct2016) to Recorded    The medication list was reviewed and updated today  Allergies    1  Erythromycin Base TABS   2  Erythromycin GEL   3  Penicillins   4  Vibramycin CAPS    Vitals  Signs    Heart Rate: 54  Systolic: 087  Diastolic: 83  Height: 5 ft 5 in  Weight: 153 lb 8 96 oz  BMI Calculated: 25 55  BSA Calculated: 1 77    Physical Exam  Left hand and wrist: No gross deformity  Skin intact  No erythema ecchymosis  Mild swelling on the ulnar aspect  Significant tenderness to palpation distal ulna and ECU  Discomfort to palpation lunotriquetral interval   Full wrist flexion and extension  Limited ulnar and radial deviation secondary to discomfort  Neurovascularly intact median ulnar and radial nerves  2+ radial pulse   Constitutional - General appearance: Normal    Musculoskeletal - Gait and station: Normal    Cardiovascular - Pulses: Normal    Skin - Skin and subcutaneous tissue: Normal    Psychiatric - Orientation to person, place, and time: Normal  Mood and affect: Normal    Eyes   Conjunctiva and lids: Normal     Pupils and irises: Normal        Results/Data  I personally reviewed the films/images/results in the office today  My interpretation follows  X-ray Review X-rays left hand and wrist 12/4/16 reveals no osseous abnormality  Future Appointments    Date/Time Provider Specialty Site   03/28/2017 03:10 PM JUAN M Moreira   Orthopedic [address] [place] [address]     Signatures   Electronically signed by : Cristel Thomas, [facility]; Feb 23 2017  1:51PM EST                       (Author)    Electronically signed by : JUAN M Chavira ; Mar  8 2017  4:41PM EST                       (Author)

## 2018-01-18 NOTE — MISCELLANEOUS
Message  Called and spoke with her  Will discontinue Toradol  Will start her on Decadron 1 mg x 5 days then 0 5 mg x 3 days in AM with food for abortive treatment  Preventative: She reports sensation of feeling her heart stop at times for a week now, which coincides with her increasing Verapamil dose, discussed stopping Verapamil immediately  Will start her on depakote 250 ER hs for migraine prevention  Discussed if cardiac s/e persists, it may not be medication s/e and to notify her PCP immediately, and notify me as well- will order EKG  Patricia Mccoy DO      Plan  Cervicalgia    · Gabapentin 100 MG Oral Capsule  Complicated migraine    · Verapamil HCl - 40 MG Oral Tablet   · Dexamethasone 0 5 MG Oral Tablet; Take 2 tabs in the AM with food x 5 days, then  take 1 tab in the AM with food x 3 days, then stop   · Divalproex Sodium  MG Oral Tablet Extended Release 24 Hour (Depakote  ER);  Take 1 Tablet in the Evening    Signatures   Electronically signed by : Domingo Madrigal MD; Nov 22 2017  6:15PM EST                       (Author)

## 2018-01-22 VITALS
SYSTOLIC BLOOD PRESSURE: 167 MMHG | HEART RATE: 54 BPM | WEIGHT: 153.56 LBS | BODY MASS INDEX: 25.58 KG/M2 | HEIGHT: 65 IN | DIASTOLIC BLOOD PRESSURE: 83 MMHG

## 2018-01-23 NOTE — PROGRESS NOTES
Assessment    1  Lumbar strain (847 2) (S39 012A)   2  Sacroiliac joint dysfunction (724 6) (M53 3)   3  Myofascial pain (729 1) (M79 1)    Plan  Lumbar strain    · Diclofenac-Misoprostol 75-0 2 MG Oral Tablet Delayed Release; TAKE 1 TABLET  BY MOUTH EVERY 12 HOURS AS NEEDED FOR PAIN  Lumbar strain, Myofascial pain, Sacroiliac joint dysfunction    · *1 - [facility]  Consult PT - please evaluate and treat for  lumbar strain and left sacroiliac joint dysfunction with sciatica symptoms  Range of  motion, stretching, strengthening, modalities  2-3 times a week for 4-6 weeks  Please  teach home exercise and stretching program   Status: Active  Requested for: 20Apr2016  Care Summary provided  : Yes  Myofascial pain    · DULoxetine HCl - 20 MG Oral Capsule Delayed Release Particles; TAKE 2  CAPSULES BY MOUTH DAILY   · Follow-up visit in 3 weeks Evaluation and Treatment  Follow-up  Status: Complete  Done:  20Apr2016    Discussion/Summary    Recurrent low back pain with left-sided sacroiliac joint dysfunction and radiation of pain into left lower extremity L4-L5 S1 pattern  Patient will start formal physical therapy  We'll do a trial of diclofenac/misoprostol again  Return to work with restrictions of lifting no more than 50 pounds  Followup in 3 weeks, sooner if needed  Patient will call me meantime with any questions or concerns  We also discussed potential referral to a spine and pain specialist if there is no significant improvement  Possible side effects of new medications were reviewed with the patient/guardian today  The treatment plan was reviewed with the patient/guardian  The patient/guardian understands and agrees with the treatment plan      Chief Complaint    1  Back Pain    History of Present Illness  HPI: Patient is a very pleasant 60-year-old female who presents for evaluation of her low back pain with radiation to the left lower extremity    She was treated by us in the past for lumbar strain with sacroiliac joint dysfunction  She also had radicular symptoms  She was recently released to full activity at work but a few weeks ago she was lifting a 55 pounds package and noted return of her back pain  Pain was localized centrally over the lumbar spine with radiation to the left buttock and hip  She also noted pain and paresthesias radiating down the posterior and lateral thigh and into the leg and lateral foot  She is currently taking Cymbalta at 40 mg daily  She had tried over-the-counter NSAIDs with no relief  Despite time since the injury patient has had no significant improvement  She denies any weakness in her lower extremities  Review of Systems    Constitutional: No fever, no chills, feels well, no tiredness, no recent weight gain or loss  Eyes: No complaints of eyesight problems, no red eyes  ENT: no loss of hearing, no nosebleeds, no sore throat  Cardiovascular: No complaints of chest pain, no palpitations, no leg claudication or lower extremity edema  Respiratory: no compliants of shortness of breath, no wheezing, no cough  Gastrointestinal: no complaints of abdominal pain, no constipation, no nausea or diarrhea, no vomiting, no bloody stools  Genitourinary: no complaints of dysuria, no incontinence  Musculoskeletal: as noted in HPI  Integumentary: no complaints of skin rash or lesion, no itching or dry skin, no skin wounds  Neurological: headache, numbness and tingling  Endocrine: No complaints of muscle weakness, no feelings of weakness, no frequent urination, no excessive thirst    Psychiatric: No suicidal thoughts, no anxiety, no feelings of depression  ROS reviewed  Active Problems    1  Acute serous otitis media, unspecified laterality   2  Back muscle spasm (724 8) (M62 830)   3  Cervical adenopathy (785 6) (R59 0)   4  Cervical strain (847 0) (S16 1XXA)   5  Cervicalgia (723 1) (M54 2)   6  Chronic sinusitis (473 9) (J32 9)   7  Ecchymosis (459 89) (R58)   8  ETD (eustachian tube dysfunction) (381 81) (H69 80)   9  Facet arthropathy, lumbar (721 3) (M46 96)   10  Glands swollen (785 6) (R59 9)   11  Hemangioma of spine (228 09) (D18 09)   12  Left wrist pain (719 43) (M25 532)   13  Lesion of lumbar spine (733 90) (M89 9)   14  Low back pain syndrome (724 2) (M54 5)   15  Low back pain with sciatica (724 3) (M54 40)   16  Lumbar strain (847 2) (S39 012A)   17  Myofascial pain (729 1) (M79 1)   18  Sacroiliac joint dysfunction (724 6) (M53 3)   19  Strain of thoracic region (847 1) (S29 019A)   20  Subluxation or dislocation extensor carpi ulnaris tendon, left, initial encounter (833 09)    (S63 095A)   21  Tear of lunotriquetral ligament, left, initial encounter (842 01) (S63 592A)   22  Thoracic back pain (724 1) (M54 6)   23  Trochanteric bursitis (726 5) (M70 60)   24  Vaginitis (616 10) (N76 0)    Past Medical History    · History of Arthralgia Of The Ulna / Radius / Wrist (719 43)   · History of Asthma (493 90) (J45 909)   · History of acute bronchitis (V12 69) (Z87 09)   · History of Hypercholesterolemia (272 0) (E78 0)   · History of Lump of skin (782 2) (R22 9)   · History of Other synovitis or tenosynovitis of forearm (727 09) (M65 839)   · History of Poisoning By Carbon Monoxide (986)    The active problems and past medical history were reviewed and updated today  Surgical History    · History of Appendectomy   · History of Breast Lumpectomy Lesion No    · History of Tonsillectomy    The surgical history was reviewed and updated today  Family History    · Family history of Congestive heart failure    · Family history of Heart attack    · Family history of Stroke    The family history was reviewed and updated today  Social History    · Being A Social Drinker   · Caffeine Use   · Former smoker (W36 76) (W45 428)  The social history was reviewed and updated today  Current Meds   1   DULoxetine HCl - 20 MG Oral Capsule Delayed Release Particles; TAKE 2 CAPSULES   BY MOUTH DAILY; Therapy: [id number] to (Cassandra Balbuena)  Requested for: 23Aha9290; Last   Rx:[id number] Ordered   2  DULoxetine HCl - 20 MG Oral Capsule Delayed Release Particles; TAKE 2 CAPSULES   BY MOUTH DAILY; Therapy: [id number] to (Evaluate:[id number])  Requested for: 24ZCO5910; Last   Rx:[id number] Ordered    The medication list was reviewed and updated today  Allergies    1  Erythromycin Base TABS   2  Erythromycin GEL   3  Penicillins   4  Vibramycin CAPS    Vitals   Recorded: 20Apr2016 03:46PM   Heart Rate 62   Systolic 159   Diastolic 79   Height 5 ft 5 in   Weight 158 lb 4 00 oz   BMI Calculated 26 33   BSA Calculated 1 79     Physical Exam      Lumbosacral Spine:   Appearance: Normal except as noted:   a loss of normal lordosis   Palpation/Tenderness: Normal  left paraspinal at level L5-S1, but not the right paraspinal  Palpatory Findings include bilateral muscle spasms and no warmth  ROM: Full and   Full range of motion but patient has some stiffness and has difficulty with rising from a flexed into extended position  Flexion was not restricted and was painless  Extension was not restricted and was painless  Rotation to the left was not restricted and was painless  Rotation to the right was not restricted and was painless  Strength Testing: Deferred   Special Tests:  equivocal Straight Leg Raise, but negative Trendelenburg's test    Left Hip: Appearance: Normal  Tenderness: None except the sacroiliac joint  Palpatory findings include no palpable clunk and no crepitus  External rotation: painful restricted AROM which was painful  Abduction: restricted AROM  Motor: Normal  Special Tests: positive JACE test    Constitutional - General appearance: Abnormal  uncomfortable  Musculoskeletal - Gait and station: Abnormal  Gait evaluation demonstrated antalgia on the left   Muscle strength/tone: Normal  Motor Strength Findings: normal lower extremity strength  Lower extremity compartments: Normal    Cardiovascular - Examination of extremities for edema and/or varicosities: Normal       Lungs: Normal respiratory rate and rhythm, no wheezes, no cough, no dyspnea  Skin - Skin and subcutaneous tissue: Normal    Neurologic - Reflexes: Normal  Deep tendon reflexes: 2+ right patella, 2+ left patella, 2+ right ankle jerk and 2+ left ankle jerk  Sensation: Abnormal  Light Touch: Diminished Sensation:Radiculopathy: Sensation to light touch findings over the following areas: diminished left knee and medial leg (L4) and diminished left lateral leg and dorsum of the foot (L5)  Peripheral Nerves:Sensory Level:Temperature:  Psychiatric - Mood and affect: Normal    Eyes   Conjunctiva and lids: Normal        Message  Return to work or school:   Ritesh Beard is under my professional care  She was seen in my office on 4/20/16     She is able to work with limitations (Limit lifting, carrying, pushing, pulling to 50 lbs)           Future Appointments    Date/Time Provider Specialty Site   05/11/2016 03:00 PM Lalitha Wagoner MD Sports Medicine [address]     Signatures   Electronically signed by : Krystina Levy MD; Apr 20 2016  4:52PM EST                       (Author)

## 2018-01-28 DIAGNOSIS — F41.9 ANXIETY: Primary | ICD-10-CM

## 2018-01-29 RX ORDER — VENLAFAXINE 25 MG/1
TABLET ORAL
Qty: 30 TABLET | Refills: 1 | Status: SHIPPED | OUTPATIENT
Start: 2018-01-29 | End: 2018-01-30 | Stop reason: SDUPTHER

## 2018-01-30 DIAGNOSIS — F41.9 ANXIETY: ICD-10-CM

## 2018-01-30 RX ORDER — VENLAFAXINE 25 MG/1
TABLET ORAL
Qty: 30 TABLET | Refills: 1 | Status: SHIPPED | OUTPATIENT
Start: 2018-01-30 | End: 2018-03-20 | Stop reason: SDUPTHER

## 2018-01-31 ENCOUNTER — HOSPITAL ENCOUNTER (EMERGENCY)
Facility: HOSPITAL | Age: 53
Discharge: HOME | End: 2018-01-31
Attending: EMERGENCY MEDICINE | Admitting: EMERGENCY MEDICINE
Payer: COMMERCIAL

## 2018-01-31 VITALS
TEMPERATURE: 98.3 F | HEART RATE: 49 BPM | DIASTOLIC BLOOD PRESSURE: 76 MMHG | OXYGEN SATURATION: 98 % | RESPIRATION RATE: 18 BRPM | SYSTOLIC BLOOD PRESSURE: 153 MMHG

## 2018-01-31 DIAGNOSIS — H81.392 PERIPHERAL VERTIGO INVOLVING LEFT EAR: Primary | ICD-10-CM

## 2018-01-31 LAB
ALBUMIN SERPL BCP-MCNC: 4 G/DL (ref 3.5–5)
ALP SERPL-CCNC: 61 U/L (ref 46–116)
ALT SERPL W P-5'-P-CCNC: 35 U/L (ref 12–78)
ANION GAP SERPL CALCULATED.3IONS-SCNC: 7 MMOL/L (ref 4–13)
AST SERPL W P-5'-P-CCNC: 24 U/L (ref 5–45)
BASOPHILS # BLD AUTO: 0.03 THOUSANDS/ΜL (ref 0–0.1)
BASOPHILS NFR BLD AUTO: 1 % (ref 0–1)
BILIRUB SERPL-MCNC: 0.3 MG/DL (ref 0.2–1)
BUN SERPL-MCNC: 15 MG/DL (ref 5–25)
CALCIUM SERPL-MCNC: 9.2 MG/DL (ref 8.3–10.1)
CHLORIDE SERPL-SCNC: 104 MMOL/L (ref 100–108)
CO2 SERPL-SCNC: 29 MMOL/L (ref 21–32)
CREAT SERPL-MCNC: 0.74 MG/DL (ref 0.6–1.3)
EOSINOPHIL # BLD AUTO: 0.08 THOUSAND/ΜL (ref 0–0.61)
EOSINOPHIL NFR BLD AUTO: 1 % (ref 0–6)
ERYTHROCYTE [DISTWIDTH] IN BLOOD BY AUTOMATED COUNT: 13.5 % (ref 11.6–15.1)
GFR SERPL CREATININE-BSD FRML MDRD: 93 ML/MIN/1.73SQ M
GLUCOSE SERPL-MCNC: 119 MG/DL (ref 65–140)
HCT VFR BLD AUTO: 40.8 % (ref 34.8–46.1)
HGB BLD-MCNC: 13.6 G/DL (ref 11.5–15.4)
LYMPHOCYTES # BLD AUTO: 1.97 THOUSANDS/ΜL (ref 0.6–4.47)
LYMPHOCYTES NFR BLD AUTO: 34 % (ref 14–44)
MCH RBC QN AUTO: 30.7 PG (ref 26.8–34.3)
MCHC RBC AUTO-ENTMCNC: 33.3 G/DL (ref 31.4–37.4)
MCV RBC AUTO: 92 FL (ref 82–98)
MONOCYTES # BLD AUTO: 0.47 THOUSAND/ΜL (ref 0.17–1.22)
MONOCYTES NFR BLD AUTO: 8 % (ref 4–12)
NEUTROPHILS # BLD AUTO: 3.28 THOUSANDS/ΜL (ref 1.85–7.62)
NEUTS SEG NFR BLD AUTO: 56 % (ref 43–75)
PLATELET # BLD AUTO: 231 THOUSANDS/UL (ref 149–390)
PMV BLD AUTO: 10 FL (ref 8.9–12.7)
POTASSIUM SERPL-SCNC: 4 MMOL/L (ref 3.5–5.3)
PROT SERPL-MCNC: 7.3 G/DL (ref 6.4–8.2)
RBC # BLD AUTO: 4.43 MILLION/UL (ref 3.81–5.12)
SODIUM SERPL-SCNC: 140 MMOL/L (ref 136–145)
TROPONIN I SERPL-MCNC: <0.02 NG/ML
WBC # BLD AUTO: 5.83 THOUSAND/UL (ref 4.31–10.16)

## 2018-01-31 PROCEDURE — 80053 COMPREHEN METABOLIC PANEL: CPT | Performed by: EMERGENCY MEDICINE

## 2018-01-31 PROCEDURE — 84484 ASSAY OF TROPONIN QUANT: CPT | Performed by: EMERGENCY MEDICINE

## 2018-01-31 PROCEDURE — 99284 EMERGENCY DEPT VISIT MOD MDM: CPT

## 2018-01-31 PROCEDURE — 93005 ELECTROCARDIOGRAM TRACING: CPT

## 2018-01-31 PROCEDURE — 36415 COLL VENOUS BLD VENIPUNCTURE: CPT

## 2018-01-31 PROCEDURE — 85025 COMPLETE CBC W/AUTO DIFF WBC: CPT | Performed by: EMERGENCY MEDICINE

## 2018-01-31 NOTE — ED PROVIDER NOTES
History  Chief Complaint   Patient presents with    Dizziness     Pt presents to the ED for evalautionf of dizziness, reports "the room is spinning since this morning" with "lightheaded," Pt reports hx of migraines and currently being treated for them  Pt denies Chest pain, SOB or any addtl symptoms     46 yr female since this am with intermitent sense of external motion- feesl room spinning worse upon movements-- with nausea-- states when remains still intense sense of external motion resolves- but still does not feel 100 % with lightheadedness-- no head/neck pain -- no recent viral illness/ new meds-- no diplopia/dysathria/dysphagia/dysphonia/ dysmetria- feels improved at present-- no cp/sob/palp/near syncope        History provided by:  Patient   used: No        Prior to Admission Medications   Prescriptions Last Dose Informant Patient Reported? Taking? MULTIPLE VITAMINS ESSENTIAL PO   Yes No   Sig: Take by mouth   butalbital-aspirin-caffeine (FIORINAL) -40 mg capsule  Self Yes No   Sig: Take 1 capsule by mouth every 8 (eight) hours as needed for headaches   gabapentin (NEURONTIN) 100 mg capsule   Yes No   Sig: Take 100 mg by mouth 3 (three) times a day 100 in the am and after noon  Patient takes 200 at night time  ondansetron (ZOFRAN) 4 mg tablet   No No   Sig: Take 1 tablet by mouth every 6 (six) hours   venlafaxine (EFFEXOR) 25 mg tablet   No No   Sig: TAKE 1 TABLET BY MOUTH MID-MORNING      Facility-Administered Medications: None       Past Medical History:   Diagnosis Date    Chronic back pain     Migraine        History reviewed  No pertinent surgical history  History reviewed  No pertinent family history  I have reviewed and agree with the history as documented  Social History   Substance Use Topics    Smoking status: Former Smoker    Smokeless tobacco: Never Used    Alcohol use No        Review of Systems   Constitutional: Negative  HENT: Negative      Eyes: Negative  Respiratory: Negative  Cardiovascular: Negative  Gastrointestinal: Positive for nausea  Negative for abdominal distention, abdominal pain, anal bleeding, blood in stool, constipation, diarrhea, rectal pain and vomiting  Endocrine: Negative  Genitourinary: Negative  Musculoskeletal: Negative  Skin: Negative  Allergic/Immunologic: Negative  Neurological: Positive for dizziness and light-headedness  Negative for tremors, seizures, syncope, facial asymmetry, speech difficulty, weakness, numbness and headaches  Hematological: Negative  Psychiatric/Behavioral: Negative  Physical Exam  ED Triage Vitals [01/31/18 1429]   Temperature Pulse Respirations Blood Pressure SpO2   98 3 °F (36 8 °C) 65 18 140/62 (!) 65 %      Temp Source Heart Rate Source Patient Position - Orthostatic VS BP Location FiO2 (%)   Oral Monitor -- -- --      Pain Score       4           Orthostatic Vital Signs  Vitals:    01/31/18 1429   BP: 140/62   Pulse: 65       Physical Exam   Constitutional: She is oriented to person, place, and time  She appears well-developed and well-nourished  No distress  avss- roshni/ htnsive- in nad-- pulse ox 98 % on ra- intepretation is normal- no intervention    HENT:   Head: Normocephalic and atraumatic  Right Ear: External ear normal    Left Ear: External ear normal    Nose: Nose normal    Mouth/Throat: Oropharynx is clear and moist  No oropharyngeal exudate  Eyes: Conjunctivae and EOM are normal  Pupils are equal, round, and reactive to light  Right eye exhibits no discharge  Left eye exhibits no discharge  No scleral icterus  Mm pink   Neck: Normal range of motion  Neck supple  No JVD present  No tracheal deviation present  No thyromegaly present  No carotid bruits   Cardiovascular: Regular rhythm, normal heart sounds and intact distal pulses  Exam reveals no gallop and no friction rub  No murmur heard    Pulmonary/Chest: Effort normal and breath sounds normal  No stridor  No respiratory distress  She has no wheezes  She has no rales  She exhibits no tenderness  Abdominal: Soft  Bowel sounds are normal  She exhibits no distension and no mass  There is no tenderness  There is no rebound and no guarding  No hernia  Musculoskeletal: Normal range of motion  She exhibits no edema, tenderness or deformity  Equal bilateral rafial/dp pulses- no ble edema/claf tendenress/assym/ erythema   Lymphadenopathy:     She has no cervical adenopathy  Neurological: She is alert and oriented to person, place, and time  She displays normal reflexes  No cranial nerve deficit or sensory deficit  She exhibits normal muscle tone  Coordination normal    fatiguable left beating horizontal nystagmus-- neg test of sckew/ normal finger to nose bilaterally - slow steady gait   Skin: Skin is warm  Capillary refill takes less than 2 seconds  No rash noted  She is not diaphoretic  No erythema  No pallor  Psychiatric: She has a normal mood and affect  Her behavior is normal  Judgment and thought content normal    Nursing note and vitals reviewed  ED Medications  Medications - No data to display    Diagnostic Studies  Results Reviewed     Procedure Component Value Units Date/Time    Troponin I [[id number]]  (Normal) Collected:  01/31/18 1454    Lab Status:  Final result Specimen:  Blood from Arm, Right Updated:  01/31/18 1517     Troponin I <0 02 ng/mL     Narrative:         [employer] Chemistry analyzer 99% cutoff is > 0 04 ng/mL in network labs    o cTnI 99% cutoff is useful only when applied to patients in the clinical setting of myocardial ischemia  o cTnI 99% cutoff should be interpreted in the context of clinical history, ECG findings and possibly cardiac imaging to establish correct diagnosis  o cTnI 99% cutoff may be suggestive but clearly not indicative of a coronary event without the clinical setting of myocardial ischemia      Comprehensive metabolic panel [[id number]] Collected: 01/31/18 1454    Lab Status:  Final result Specimen:  Blood from Arm, Right Updated:  01/31/18 1515     Sodium 140 mmol/L      Potassium 4 0 mmol/L      Chloride 104 mmol/L      CO2 29 mmol/L      Anion Gap 7 mmol/L      BUN 15 mg/dL      Creatinine 0 74 mg/dL      Glucose 119 mg/dL      Calcium 9 2 mg/dL      AST 24 U/L      ALT 35 U/L      Alkaline Phosphatase 61 U/L      Total Protein 7 3 g/dL      Albumin 4 0 g/dL      Total Bilirubin 0 30 mg/dL      eGFR 93 ml/min/1 73sq m     Narrative:         National Kidney Disease Education Program recommendations are as follows:  GFR calculation is accurate only with a steady state creatinine  Chronic Kidney disease less than 60 ml/min/1 73 sq  meters  Kidney failure less than 15 ml/min/1 73 sq  meters      CBC and differential [[id number]]  (Normal) Collected:  01/31/18 1454    Lab Status:  Final result Specimen:  Blood from Arm, Right Updated:  01/31/18 1459     WBC 5 83 Thousand/uL      RBC 4 43 Million/uL      Hemoglobin 13 6 g/dL      Hematocrit 40 8 %      MCV 92 fL      MCH 30 7 pg      MCHC 33 3 g/dL      RDW 13 5 %      MPV 10 0 fL      Platelets 385 Thousands/uL      Neutrophils Relative 56 %      Lymphocytes Relative 34 %      Monocytes Relative 8 %      Eosinophils Relative 1 %      Basophils Relative 1 %      Neutrophils Absolute 3 28 Thousands/µL      Lymphocytes Absolute 1 97 Thousands/µL      Monocytes Absolute 0 47 Thousand/µL      Eosinophils Absolute 0 08 Thousand/µL      Basophils Absolute 0 03 Thousands/µL                  No orders to display              Procedures  Procedures       Phone Contacts  ED Phone Contact    ED Course  ED Course as of Jan 31 1852 Wed Jan 31, 2018   1730 ER MD NOTE- PT WENT TO SEE PT- PT NOT IN ROOM     1800 ER MD NOTE - LABS/ ECG- FIRST NURSED         1800 ER MD PROCEDURE NOTE: FOR EPLEY MANEUVER--  HOR NYSTAGMSU ON LEFT ZAVALA GAZE ON PE--  STARTED ON LEFT  FOR 2 MINUTES- MILD SYMPTOMS- NO ROTARY NYSTAGMUS-- THEN ON R FOR 2 MINUTES- NO SYMPTOMS- THEN AGAIN PLACED DOWN WITH NECK ROTATED TO LEFT FOR 2 MINUTES-  THEN PT ROTATED ON LEFT SHOULDER WITH HEAD DOWN AND ROTATED TO LEFT FOR 2 MINUTES- PT TOELRATED PROCEDURE WELL     1850 ER MD NOTE-  PT - RE-EVALUATED FEEL IMPROVED- TOLERATED AMBULATION WITH MILD ASSISTANCE C/O LIGHTHEADEDNESS    1851 ER MD MEDICAL DECISION MAKING NOTE-  BASED ON H AND P -- CLINICAL SUSPICION OF CENTRAL CAUSE OF VERTIGO IS LOW WILL D/C- WITH REASONS WHEN TO RETURN TO  THE ER                                 [facility]  CritCare Time    Disposition  Final diagnoses:   None     ED Disposition     None      Follow-up Information    None       Patient's Medications   Discharge Prescriptions    No medications on file     No discharge procedures on file      ED Provider  Electronically Signed by           Maria E Timmons MD  02/01/18 1794

## 2018-01-31 NOTE — ED PROCEDURE NOTE
PROCEDURE  ECG 12 Lead Documentation  Date/Time: 1/31/2018 6:04 PM  Performed by: Rosie Winston  Authorized by: Rosie Winston     Indications / Diagnosis:  LIGHTHEAEDNESS/ DIZZINESS  ECG reviewed by me, the ED Provider: yes    Patient location:  ED and bedside  Previous ECG:     Previous ECG:  Unavailable  Interpretation:     Interpretation: non-specific    Rate:     ECG rate:  55    ECG rate assessment: bradycardic    Rhythm:     Rhythm: sinus bradycardia    Ectopy:     Ectopy: none    QRS:     QRS axis:  Normal    QRS intervals:  Normal  Conduction:     Conduction: abnormal      Abnormal conduction: incomplete RBBB    ST segments:     ST segments:  Normal  T waves:     T waves: flattening      Flattening:  AVL, III, V1, V3, V2 and V4  Q waves:     Q waves:  V1  Other findings:     Other findings: poor R wave progression and U wave    Comments:      NO ECG SIGNS OF ISCHEMIA/ INJURY-- NO ECG SIGNS OF LONG QTC/ /WPW/BRUGADA SYNDROME/HCM/ EPSILON WAVES         Dorina Hurd MD  01/31/18 1124

## 2018-01-31 NOTE — DISCHARGE INSTRUCTIONS
DIAGNOSIS:   PERIPHERAL VERTIGO- SENSE OF EXTERNAL MOTION -- LIKELY  BENIGN PAROXYSMAL POSITIONAL VERTIGO- BPPV    - ACTIVITY AS TOLERATED -- TAKE TIME GETTING UP AND CHANGING POSITIONS - SLOW DELIBERATE MOVEMENTS FOR NEXT SEVERAL DAYS-- IT MIGHT TAKE SEVERAL DAYS TO FEEL BACK TO NORMAL     - PLEASE RETURN TO  THE ER FOR ANY CONSTANT SENSE OF EXTERNAL MOTION  WITH INABILITY TO WALK / ANY PERSISTENT VOMITING // ANY NEW PROBLEMS WITH VISION / ANY DIFFICULTY TALKING OR SWALLOWING  OR ANY NEW/ WORSENING/CONCERNING SYMPTOMS TO YOU     - WOULD RECOMMEND CALLING YOUR PRIMARY DOCTOR TOMORROW TO SCHEDULE AN APPOINT FOR A RECHECK WITHIN 1 WEEK     - IF THESE SYMPTOMS BECOME MORE FREQUENT   YOU CAN CALL [facility] PHYSICAL THERAPY  AT [phone]- TELL THEM YOU WANT TO SCHEDULE AN APPT WITH THE PHYSICAL THERAPIST THAT DEALS WITH VERTIGO

## 2018-02-01 ENCOUNTER — TELEPHONE (OUTPATIENT)
Dept: FAMILY MEDICINE CLINIC | Facility: CLINIC | Age: 53
End: 2018-02-01

## 2018-02-01 LAB
ATRIAL RATE: 57 BPM
P AXIS: 75 DEGREES
PR INTERVAL: 140 MS
QRS AXIS: 77 DEGREES
QRSD INTERVAL: 92 MS
QT INTERVAL: 428 MS
QTC INTERVAL: 410 MS
T WAVE AXIS: 58 DEGREES
VENTRICULAR RATE: 55 BPM

## 2018-02-01 NOTE — TELEPHONE ENCOUNTER
Confirm her dose and when she is taking it-  We can back down on the dose or change the formulation since it iss helping- usually those sx go away after the first week but if she is still experiencing them, we can change it up

## 2018-02-01 NOTE — ED NOTES
Pt stable, no distress noted, pt amb from ER with family without difficulty     Nguyễn Zayas RN  01/31/18 1922

## 2018-02-01 NOTE — TELEPHONE ENCOUNTER
SPOKE WITH PATIENT GIVE INSTRUCTIONS  Ajay Cowan Him PATIENT TO GO TO [address] [employer]  PATIENT STATED THEY DX HER WITH VERTIGO EVEN THO DURING THEM TESTING HER SHE DID NOT SHOW SIGNS OR SYMPTOMS

## 2018-02-02 ENCOUNTER — TELEPHONE (OUTPATIENT)
Dept: FAMILY MEDICINE CLINIC | Facility: CLINIC | Age: 53
End: 2018-02-02

## 2018-02-02 DIAGNOSIS — R42 VERTIGO: Primary | ICD-10-CM

## 2018-02-02 RX ORDER — MECLIZINE HCL 12.5 MG/1
12.5 TABLET ORAL 3 TIMES DAILY PRN
Qty: 21 TABLET | Refills: 0 | Status: SHIPPED | OUTPATIENT
Start: 2018-02-02 | End: 2018-06-14

## 2018-02-02 NOTE — TELEPHONE ENCOUNTER
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

## 2018-02-02 NOTE — TELEPHONE ENCOUNTER
If she wants I can call in meclizine to be used as needed   Alternatively next week she could consider vestibular therapy if symptoms persist

## 2018-02-06 ENCOUNTER — OFFICE VISIT (OUTPATIENT)
Dept: NEUROLOGY | Facility: CLINIC | Age: 53
End: 2018-02-06
Payer: COMMERCIAL

## 2018-02-06 VITALS
HEART RATE: 56 BPM | SYSTOLIC BLOOD PRESSURE: 110 MMHG | WEIGHT: 151 LBS | DIASTOLIC BLOOD PRESSURE: 78 MMHG | HEIGHT: 66 IN | BODY MASS INDEX: 24.27 KG/M2

## 2018-02-06 DIAGNOSIS — Z77.29 CARBON MONOXIDE EXPOSURE: ICD-10-CM

## 2018-02-06 DIAGNOSIS — G43.109 VERTIGINOUS MIGRAINE: Primary | ICD-10-CM

## 2018-02-06 DIAGNOSIS — R42 VERTIGO: ICD-10-CM

## 2018-02-06 PROCEDURE — 99214 OFFICE O/P EST MOD 30 MIN: CPT | Performed by: PHYSICIAN ASSISTANT

## 2018-02-06 RX ORDER — GABAPENTIN 100 MG/1
CAPSULE ORAL
Qty: 120 CAPSULE | Refills: 2 | Status: SHIPPED | OUTPATIENT
Start: 2018-02-06 | End: 2018-08-11 | Stop reason: SDUPTHER

## 2018-02-06 RX ORDER — VERAPAMIL HYDROCHLORIDE 40 MG/1
TABLET ORAL
COMMUNITY
Start: 2017-11-13 | End: 2018-02-06 | Stop reason: ALTCHOICE

## 2018-02-06 RX ORDER — PROCHLORPERAZINE MALEATE 5 MG/1
TABLET ORAL
COMMUNITY
Start: 2017-11-13 | End: 2018-03-12 | Stop reason: SDUPTHER

## 2018-02-06 RX ORDER — KETOROLAC TROMETHAMINE 10 MG/1
TABLET, FILM COATED ORAL
COMMUNITY
Start: 2017-11-13 | End: 2018-12-30 | Stop reason: SDUPTHER

## 2018-02-06 RX ORDER — METHYLPREDNISOLONE 4 MG/1
TABLET ORAL
Qty: 21 TABLET | Refills: 0 | Status: SHIPPED | OUTPATIENT
Start: 2018-02-06 | End: 2018-06-14

## 2018-02-06 RX ORDER — BUDESONIDE AND FORMOTEROL FUMARATE DIHYDRATE 160; 4.5 UG/1; UG/1
2 AEROSOL RESPIRATORY (INHALATION)
COMMUNITY
End: 2018-06-21

## 2018-02-06 RX ORDER — DIVALPROEX SODIUM 250 MG/1
1 TABLET, EXTENDED RELEASE ORAL
COMMUNITY
Start: 2017-11-22 | End: 2018-02-06 | Stop reason: SDUPTHER

## 2018-02-06 RX ORDER — DIVALPROEX SODIUM 250 MG/1
500 TABLET, EXTENDED RELEASE ORAL
Qty: 60 TABLET | Refills: 2 | Status: SHIPPED | OUTPATIENT
Start: 2018-02-06 | End: 2018-05-06 | Stop reason: SDUPTHER

## 2018-02-06 RX ORDER — DEXAMETHASONE 0.5 MG/5ML
ELIXIR ORAL
COMMUNITY
Start: 2017-11-22 | End: 2018-06-14

## 2018-02-06 RX ORDER — CETIRIZINE HYDROCHLORIDE 10 MG/1
10 TABLET ORAL
COMMUNITY
End: 2020-09-28 | Stop reason: SDUPTHER

## 2018-02-06 NOTE — PROGRESS NOTES
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    Other orders  -     dexamethasone 0 5 MG/5ML elixir; Take by mouth  -     budesonide-formoterol (SYMBICORT) 160-4 5 mcg/act inhaler; Inhale 2 puffs  -     cetirizine (ZyrTEC) 10 mg tablet; Take 10 mg by mouth  -     Discontinue: divalproex sodium (DEPAKOTE ER) 250 mg 24 hr tablet; Take 1 tablet by mouth  -     ketorolac (TORADOL) 10 mg tablet; Take by mouth  -     prochlorperazine (COMPAZINE) 5 mg tablet; Take by mouth  -     Discontinue: verapamil (CALAN) 40 mg tablet; Past CO exposure as the probable cause for her WM ischemia on brain MRI  No need for further work up at this time, except for getting repeat Lyme as ordered (last result equivocal)  I reminded her of this  CTA head/neck w/wo contrast with unremarkable  For probable vestibular migraines, increase Depakote to 500 mg or 2 tabs x 1 week, if not better by then can start medrol/ steroid dose pack  She will try vestibular therapy if that does not help  Possible BPPV as her vertigo is usually triggered by moving her head/ body  Will also continue gabapentin as dosed since this has been helpful for headaches  She was encouraged to call us should the above recommendations not help  Subjective:    JILLIAN Sarkar is a 45 yo female presenting with headaches and vertigo  Diagnosed with vestibular migraines but Dr Jhonatan Nolen when last seen in November      She is a   Starting last Wednesday 1/31/2018 she woke with significant vertigo which lasted several minutes, what but with episodic throughout the day  She reports that it is very disorienting and alarming for her when she gets the vertigo  She usually gets it when she is standing and turns her body or her head  She denies lightheadedness or the feeling that she is going to pass out like presyncope  She has not lost consciousness  She denies vision changes other than blurred vision and a feeling of room spinning sensation  She also feels like she is on a boat in the middle of the ocean or on a water bed  She sometimes has a migraine or headache associated with this but not always  She denies any hearing loss, tinnitus or ear pain  She was prescribed meclizine, which she takes every morning on a daily basis and this causes some grogginess, but it does help for the vertigo  When the vertigo comes on at usually last a few minutes and then subsides on its own  She did not find verapamil helpful for her migraines are vertigo, and she also develops side effects but she does not remember what the side effects were  Since starting Depakote which was an alternative prescribed by Dr Rafael Colunga, she felt that this was somewhat helpful and she wants to continue it  She denies any side effects  She has a hx of CO poisoning in 2012 occurred at work during [facility] when she was by herself at a back up generator was turned on and she was unknowingly exposed to [address] fumes, was found unconscious at work- treated at [facility]- states since then residual mild speech change, paraphasic errors at time  me at her work now, she is having likely CO exposure again due to periodic opening of the vent where CO fumes come from, and states is worried is being exposed to [address] at [facility], [place], operated by [facility] by the [facility] building per her   She tells me they do not have a CO detector as far as she is aware  I discussed that we would notify [employer]SHA regarding this for safety concerns or she could  She had a tick bite a few months ago but denies any typical bulls eye rash or arthralgias or significant fatigue- is undergoing Lyme testing per PCP  She forgot to get repeat testing in December (last result was equivocal), so I asked her to get this done asap  ---    The following portions of the patient's history were reviewed and updated as appropriate:   She  has a past medical history of Chronic back pain and Migraine  She  does not have a problem list on file  She  has no past surgical history on file  Her family history is not on file  She  reports that she has quit smoking  She has never used smokeless tobacco  She reports that she does not drink alcohol or use drugs  Current Outpatient Prescriptions   Medication Sig Dispense Refill    dexamethasone 0 5 MG/5ML elixir Take by mouth      divalproex sodium (DEPAKOTE ER) 250 mg 24 hr tablet Take 1 tablet by mouth      ketorolac (TORADOL) 10 mg tablet Take by mouth      prochlorperazine (COMPAZINE) 5 mg tablet Take by mouth      budesonide-formoterol (SYMBICORT) 160-4 5 mcg/act inhaler Inhale 2 puffs      butalbital-aspirin-caffeine (FIORINAL) -40 mg capsule Take 1 capsule by mouth every 8 (eight) hours as needed for headaches      cetirizine (ZyrTEC) 10 mg tablet Take 10 mg by mouth      gabapentin (NEURONTIN) 100 mg capsule Take 100 mg by mouth 3 (three) times a day 100 in the am and after noon  Patient takes 200 at night time        meclizine (ANTIVERT) 12 5 MG tablet Take 1 tablet (12 5 mg total) by mouth 3 (three) times a day as needed for dizziness for up to 7 days 21 tablet 0    MULTIPLE VITAMINS ESSENTIAL PO Take by mouth      ondansetron (ZOFRAN) 4 mg tablet Take 1 tablet by mouth every 6 (six) hours 20 tablet 0    venlafaxine (EFFEXOR) 25 mg tablet TAKE 1 TABLET BY MOUTH MID-MORNING 30 tablet 1    verapamil (CALAN) 40 mg tablet No current facility-administered medications for this visit  Current Outpatient Prescriptions on File Prior to Visit   Medication Sig    butalbital-aspirin-caffeine [facility]) -40 mg capsule Take 1 capsule by mouth every 8 (eight) hours as needed for headaches    gabapentin (NEURONTIN) 100 mg capsule Take 100 mg by mouth 3 (three) times a day 100 in the am and after noon  Patient takes 200 at night time   meclizine (ANTIVERT) 12 5 MG tablet Take 1 tablet (12 5 mg total) by mouth 3 (three) times a day as needed for dizziness for up to 7 days    MULTIPLE VITAMINS ESSENTIAL PO Take by mouth    ondansetron (ZOFRAN) 4 mg tablet Take 1 tablet by mouth every 6 (six) hours    venlafaxine (EFFEXOR) 25 mg tablet TAKE 1 TABLET BY MOUTH MID-MORNING     No current facility-administered medications on file prior to visit  She is allergic to doxycycline; erythromycin; other; and penicillins            Objective:    Blood pressure 110/78, pulse 56, height 5' 6" (1 676 m), weight 68 5 kg (151 lb)  Physical Exam  The patient is well-developed and well-nourished, and is in no apparent distress  The patient is pleasant and cooperative with the examination  Head is normocephalic  Oropharynx is clear and mucous membranes are moist  Neck is supple and non-tender  Neurological Exam  On neurologic exam, the patient is alert and oriented to time and place  Speech is fluent and articulate, and the patient follows commands appropriately  Judgment and affect appear normal  Pupils are equally round and reactive to light, extraocular muscles are intact without nystagmus, visual fields are full to confrontation, and optic discs are sharp and flat bilaterally  Face is symmetric, and tongue, uvula, and palate are midline  Facial sensation is normal and symmetric, in all 3 divisions of the trigeminal nerve  Hearing is intact  Neck flexor and extensor strength is 5/5   Motor examination reveals intact strength, tone, and bulk throughout  Negative pronator drift on both sides  Reflexes are intact and symmetric throughout  Sensation is intact to light touch in all 4 extremities  Coordination is intact on rapid alternating movement and finger-to-nose testing  Normal gait is steady  ROS:    Review of Systems   HENT: Negative  Eyes: Negative  Respiratory: Negative  Cardiovascular: Negative  Gastrointestinal: Positive for nausea  Endocrine: Negative  Genitourinary: Negative  Musculoskeletal: Negative  Skin: Negative  Allergic/Immunologic: Negative  Neurological: Positive for dizziness, weakness and headaches  Hematological: Negative  Psychiatric/Behavioral: Negative  Review of systems, Past medical history, Surgical history, Family history, Social history and Medication history were reviewed and otherwise unremarkable from a neurological perspective

## 2018-02-06 NOTE — PATIENT INSTRUCTIONS
For vertigo + ?migraine, increase Depakote to 500 mg or 2 tabs x 1 week, if not better by then can start medrol/ steroid dose pack  Try PT if that does not help

## 2018-02-21 ENCOUNTER — TELEPHONE (OUTPATIENT)
Dept: FAMILY MEDICINE CLINIC | Facility: CLINIC | Age: 53
End: 2018-02-21

## 2018-03-12 DIAGNOSIS — R42 VERTIGO: Primary | ICD-10-CM

## 2018-03-12 RX ORDER — PROCHLORPERAZINE MALEATE 5 MG/1
TABLET ORAL
Qty: 30 TABLET | Refills: 1 | Status: SHIPPED | OUTPATIENT
Start: 2018-03-12 | End: 2018-09-09 | Stop reason: SDUPTHER

## 2018-03-20 DIAGNOSIS — F41.9 ANXIETY: ICD-10-CM

## 2018-03-20 RX ORDER — VENLAFAXINE 25 MG/1
TABLET ORAL
Qty: 60 TABLET | Refills: 1 | Status: SHIPPED | OUTPATIENT
Start: 2018-03-20 | End: 2018-05-15 | Stop reason: SDUPTHER

## 2018-04-05 ENCOUNTER — OFFICE VISIT (OUTPATIENT)
Dept: NEUROLOGY | Facility: CLINIC | Age: 53
End: 2018-04-05
Payer: COMMERCIAL

## 2018-04-05 VITALS
BODY MASS INDEX: 24.43 KG/M2 | SYSTOLIC BLOOD PRESSURE: 122 MMHG | WEIGHT: 152 LBS | HEIGHT: 66 IN | HEART RATE: 57 BPM | DIASTOLIC BLOOD PRESSURE: 82 MMHG

## 2018-04-05 DIAGNOSIS — G43.109 VERTIGINOUS MIGRAINE: ICD-10-CM

## 2018-04-05 DIAGNOSIS — R42 VERTIGO: Primary | ICD-10-CM

## 2018-04-05 DIAGNOSIS — R13.10 DYSPHAGIA, UNSPECIFIED TYPE: ICD-10-CM

## 2018-04-05 DIAGNOSIS — Z77.29 CARBON MONOXIDE EXPOSURE: ICD-10-CM

## 2018-04-05 PROCEDURE — 99215 OFFICE O/P EST HI 40 MIN: CPT | Performed by: PSYCHIATRY & NEUROLOGY

## 2018-04-05 NOTE — PROGRESS NOTES
Patient ID: Manuel Kelley is a 46 y o  female  Assessment/Plan:    No problem-specific Assessment & Plan notes found for this encounter  Diagnoses and all orders for this visit:    States few months ago acute onset vertigo and dysphagia- vertigo otherwise has largely resolved other than with quick positional change, which in itself sounds peripheral however given dysphagia onset at same time- would like to rule out brainstem infarct and thus ordering MRI brain no contrast   Continue with daily ASA 81 mg- would help with secondary stroke prevention  No significant vascular risk factors otherwise  Vertigo  -     MRI brain without contrast; Future    Dysphagia, unspecified type  -     MRI brain without contrast; Future    Chronic migraine  - Significantly improved by over 50% reduction in frequency and severity  - Commended her in stopping nearly daily excedrin use  - C/w Depakote 500 qhs for preventative treatment and toradol max 2-3 days a week for abortive treatment  Vertiginous migraine    Carbon monoxide exposure       Follow up in six months with Stephane KOROMA- and one year with me  Subjective:    HPI     Ms  Real Brothers is seen in follow up for chronic migraines and states is doing well  States migraines now couple times a week maximum vs 4-5x a week prior to Depakote and gabapentin  States easily aborted with Toradol and states much less severity now  States does not use Excedrin any longer as recommended last visit  States Verapamil gave her heart palpitations thus stopped this as recommended by us prior to her last visit here  She is on Depakote 500 mg nightly and states no adverse effects    She tells me she had vertigo end of January and since then dysphagia at times as well  She is on ASA 81 mg daily    The following portions of the patient's history were reviewed and updated as appropriate: allergies, current medications, past family history, past medical history, past social history, past surgical history and problem list          Objective:    Blood pressure 122/82, pulse 57, height 5' 6" (1 676 m), weight 68 9 kg (152 lb)  Physical Exam   Constitutional: She is oriented to person, place, and time  She appears well-developed and well-nourished  HENT:   Head: Normocephalic and atraumatic  Eyes: EOM are normal  Pupils are equal, round, and reactive to light  Neck: Normal range of motion  Cardiovascular: Normal rate and regular rhythm  Pulmonary/Chest: Effort normal    Abdominal: Soft  Neurological: She is alert and oriented to person, place, and time  She has normal strength and normal reflexes  Gait normal    Nursing note and vitals reviewed  Neurological Exam    Mental Status  The patient is alert and oriented to person, place, time, and situation  Her recent and remote memory are normal  She has no dysarthria  She is able to name object, read and repeat  She has normal attention span and concentration  She follows multi-step commands  She has a normal fund of knowledge  Cranial Nerves    CN II: The patient's visual acuity and visual fields are normal   CN III, IV, VI: The patient's pupils are equally round and reactive to light and ocular movements are normal   CN V: The patient has normal facial sensation  CN VII:  The patient has symmetric facial movement  CN VIII:  The patient's hearing is normal   CN IX, X: The patient has symmetric palate movement and normal gag reflex  CN XI: The patient's shoulder shrug strength is normal   CN XII: The patient's tongue is midline without atrophy or fasciculations  Motor  The patient has normal muscle bulk throughout  Her overall muscle tone is normal throughout  Her strength is 5/5 throughout all four extremities  Sensory  The patient's sensation is normal in all four extremities to light touch, temperature and vibration  She has no right-sided and no left-sided hemispatial neglect      Reflexes  Deep tendon reflexes are 2+ and symmetric in all four extremities with downgoing toes bilaterally  Gait and Coordination  The patient has normal gait and station  ROS:    Review of Systems   Constitutional: Negative  Negative for appetite change and fever  HENT: Negative  Negative for hearing loss, tinnitus, trouble swallowing and voice change  Eyes: Negative  Negative for photophobia and pain  Respiratory: Negative  Negative for shortness of breath  Cardiovascular: Negative  Negative for palpitations  Gastrointestinal: Negative  Negative for nausea and vomiting  Endocrine: Negative  Negative for cold intolerance and heat intolerance  Genitourinary: Negative  Negative for dysuria, frequency and urgency  Musculoskeletal: Negative  Negative for myalgias and neck pain  Skin: Negative  Negative for rash  Neurological: Positive for dizziness and headaches  Negative for tremors, seizures, syncope, facial asymmetry, speech difficulty, weakness, light-headedness and numbness  Hematological: Negative  Does not bruise/bleed easily  Psychiatric/Behavioral: Negative  Negative for confusion, hallucinations and sleep disturbance

## 2018-04-19 ENCOUNTER — HOSPITAL ENCOUNTER (OUTPATIENT)
Dept: MRI IMAGING | Facility: HOSPITAL | Age: 53
Discharge: HOME | End: 2018-04-19
Attending: PSYCHIATRY & NEUROLOGY
Payer: COMMERCIAL

## 2018-04-19 DIAGNOSIS — R13.10 DYSPHAGIA, UNSPECIFIED TYPE: ICD-10-CM

## 2018-04-19 DIAGNOSIS — R42 VERTIGO: ICD-10-CM

## 2018-04-19 PROCEDURE — 70551 MRI BRAIN STEM W/O DYE: CPT

## 2018-05-06 DIAGNOSIS — G43.109 VERTIGINOUS MIGRAINE: ICD-10-CM

## 2018-05-07 RX ORDER — DIVALPROEX SODIUM 250 MG/1
500 TABLET, EXTENDED RELEASE ORAL
Qty: 60 TABLET | Refills: 2 | Status: SHIPPED | OUTPATIENT
Start: 2018-05-07 | End: 2018-07-28 | Stop reason: SDUPTHER

## 2018-05-15 DIAGNOSIS — F41.9 ANXIETY: ICD-10-CM

## 2018-05-15 RX ORDER — VENLAFAXINE 25 MG/1
TABLET ORAL
Qty: 60 TABLET | Refills: 1 | Status: SHIPPED | OUTPATIENT
Start: 2018-05-15 | End: 2018-05-16 | Stop reason: SDUPTHER

## 2018-05-16 DIAGNOSIS — F41.9 ANXIETY: ICD-10-CM

## 2018-05-17 RX ORDER — VENLAFAXINE 25 MG/1
TABLET ORAL
Qty: 60 TABLET | Refills: 1 | Status: SHIPPED | OUTPATIENT
Start: 2018-05-17 | End: 2018-07-11 | Stop reason: SDUPTHER

## 2018-06-14 ENCOUNTER — APPOINTMENT (OUTPATIENT)
Dept: RADIOLOGY | Facility: MEDICAL CENTER | Age: 53
End: 2018-06-14
Payer: COMMERCIAL

## 2018-06-14 ENCOUNTER — OFFICE VISIT (OUTPATIENT)
Dept: OBGYN CLINIC | Facility: MEDICAL CENTER | Age: 53
End: 2018-06-14
Payer: COMMERCIAL

## 2018-06-14 VITALS
WEIGHT: 155.2 LBS | DIASTOLIC BLOOD PRESSURE: 78 MMHG | SYSTOLIC BLOOD PRESSURE: 116 MMHG | BODY MASS INDEX: 24.94 KG/M2 | HEART RATE: 73 BPM | HEIGHT: 66 IN

## 2018-06-14 DIAGNOSIS — M25.421 EFFUSION OF RIGHT ELBOW: ICD-10-CM

## 2018-06-14 DIAGNOSIS — M25.521 PAIN IN RIGHT ELBOW: ICD-10-CM

## 2018-06-14 DIAGNOSIS — M25.521 PAIN IN RIGHT ELBOW: Primary | ICD-10-CM

## 2018-06-14 PROCEDURE — 99204 OFFICE O/P NEW MOD 45 MIN: CPT | Performed by: FAMILY MEDICINE

## 2018-06-14 PROCEDURE — 73080 X-RAY EXAM OF ELBOW: CPT

## 2018-06-14 NOTE — PROGRESS NOTES
Assessment:     1  Pain in right elbow    2  Effusion of right elbow        Plan:     Problem List Items Addressed This Visit     Pain in right elbow - Primary    Relevant Orders    XR elbow 3+ vw right    MRI elbow right wo contrast    Effusion of right elbow    Relevant Orders    MRI elbow right wo contrast         Subjective:     Patient ID: Noel Bailey is a 46 y o  female  Chief Complaint:  Patient is a 66-year-old female presenting today for evaluation of right elbow pain  She reports hitting her right elbow against a hard surface wall about 2 weeks ago  Since that time she reported immediate onset of pain followed by swelling  Pain continues today is a throbbing, achy pain along the anterior aspect of the right elbow  Pain radiates down her right arm and into her right wrist  Pain is reproduced when attempting to turn her wrist or arm  She reports no alleviating factors  She does work as a  and states that carrying heavy objects makes her pain worse  She denies any numbness or tingling in the arm or hand regions  She denies any crepitus, warmth  Allergy:  Allergies   Allergen Reactions    Doxycycline     Erythromycin     Other      Mushrooms    Penicillins      Medications:  all current active meds have been reviewed  Past Medical History:  Past Medical History:   Diagnosis Date    Chronic back pain     Migraine      Past Surgical History:  Past Surgical History:   Procedure Laterality Date    APPENDECTOMY      BREAST LUMPECTOMY      TONSILLECTOMY       Family History:  Family History   Problem Relation Age of Onset    Heart failure Mother     Heart attack Father     Hypertension Brother     Diabetes Maternal Aunt     Stroke Maternal Aunt      Social History:  History   Alcohol Use No     History   Drug Use No     History   Smoking Status    Former Smoker   Smokeless Tobacco    Never Used     Review of Systems   Constitutional: Negative  HENT: Negative      Eyes: Negative  Respiratory: Negative  Cardiovascular: Negative  Gastrointestinal: Negative  Genitourinary: Negative  Musculoskeletal: Positive for arthralgias and myalgias  Skin: Negative  Allergic/Immunologic: Negative  Neurological: Negative  Hematological: Negative  Psychiatric/Behavioral: Negative  Objective:  BP Readings from Last 1 Encounters:   06/14/18 116/78      Wt Readings from Last 1 Encounters:   06/14/18 70 4 kg (155 lb 3 2 oz)      BMI:   Estimated body mass index is 25 05 kg/m² as calculated from the following:    Height as of this encounter: 5' 6" (1 676 m)  Weight as of this encounter: 70 4 kg (155 lb 3 2 oz)  BSA:   Estimated body surface area is 1 8 meters squared as calculated from the following:    Height as of this encounter: 5' 6" (1 676 m)  Weight as of this encounter: 70 4 kg (155 lb 3 2 oz)  Physical Exam   Constitutional: She is oriented to person, place, and time  Vital signs are normal  She appears well-developed  HENT:   Head: Normocephalic  Eyes: Pupils are equal, round, and reactive to light  Pulmonary/Chest: Effort normal    Musculoskeletal: She exhibits edema and tenderness  Neurological: She is alert and oriented to person, place, and time  Skin: Skin is warm and dry  Psychiatric: She has a normal mood and affect  Nursing note and vitals reviewed  Right Elbow Exam     Tenderness   The patient is experiencing tenderness in the olecranon fossa, medial epicondyle and lateral epicondyle  Range of Motion   Extension: abnormal   Flexion: normal   Pronation: normal   Supination: normal     Muscle Strength   Pronation:  4/5   Supination:  4/5     Tests Varus: negative  Valgus: negative        Other   Erythema: present  Sensation: normal  Pulse: present            I have personally reviewed pertinent films in PACS

## 2018-06-16 ENCOUNTER — HOSPITAL ENCOUNTER (OUTPATIENT)
Dept: MRI IMAGING | Facility: HOSPITAL | Age: 53
Discharge: HOME | End: 2018-06-16
Attending: FAMILY MEDICINE
Payer: COMMERCIAL

## 2018-06-16 DIAGNOSIS — M25.521 PAIN IN RIGHT ELBOW: ICD-10-CM

## 2018-06-16 DIAGNOSIS — M25.421 EFFUSION OF RIGHT ELBOW: ICD-10-CM

## 2018-06-16 PROCEDURE — 73221 MRI JOINT UPR EXTREM W/O DYE: CPT

## 2018-06-21 ENCOUNTER — OFFICE VISIT (OUTPATIENT)
Dept: OBGYN CLINIC | Facility: MEDICAL CENTER | Age: 53
End: 2018-06-21
Payer: COMMERCIAL

## 2018-06-21 VITALS
SYSTOLIC BLOOD PRESSURE: 129 MMHG | DIASTOLIC BLOOD PRESSURE: 76 MMHG | BODY MASS INDEX: 25.07 KG/M2 | WEIGHT: 156 LBS | HEIGHT: 66 IN | HEART RATE: 67 BPM

## 2018-06-21 DIAGNOSIS — M25.421 EFFUSION OF RIGHT ELBOW: Primary | ICD-10-CM

## 2018-06-21 DIAGNOSIS — M25.521 PAIN IN RIGHT ELBOW: ICD-10-CM

## 2018-06-21 PROCEDURE — 20605 DRAIN/INJ JOINT/BURSA W/O US: CPT | Performed by: FAMILY MEDICINE

## 2018-06-21 PROCEDURE — 99213 OFFICE O/P EST LOW 20 MIN: CPT | Performed by: FAMILY MEDICINE

## 2018-06-21 RX ORDER — TRIAMCINOLONE ACETONIDE 40 MG/ML
40 INJECTION, SUSPENSION INTRA-ARTICULAR; INTRAMUSCULAR
Status: COMPLETED | OUTPATIENT
Start: 2018-06-21 | End: 2018-06-21

## 2018-06-21 RX ORDER — LIDOCAINE HYDROCHLORIDE 10 MG/ML
2 INJECTION, SOLUTION INFILTRATION; PERINEURAL
Status: COMPLETED | OUTPATIENT
Start: 2018-06-21 | End: 2018-06-21

## 2018-06-21 RX ADMIN — LIDOCAINE HYDROCHLORIDE 2 ML: 10 INJECTION, SOLUTION INFILTRATION; PERINEURAL at 11:06

## 2018-06-21 RX ADMIN — TRIAMCINOLONE ACETONIDE 40 MG: 40 INJECTION, SUSPENSION INTRA-ARTICULAR; INTRAMUSCULAR at 11:06

## 2018-06-21 NOTE — PROGRESS NOTES
Assessment:      1  Effusion of right elbow    2  Pain in right elbow        Plan:     Problem List Items Addressed This Visit     Pain in right elbow    Relevant Orders    Ambulatory referral to Physical Therapy    Effusion of right elbow - Primary    Relevant Orders    Ambulatory referral to Physical Therapy         Subjective:     Patient ID: Noel Bailey is a 46 y o  female  Chief Complaint:  Patient presents today for follow-up of right elbow pain and MRI results  Pain continues today is a throbbing, achy pain along the anterior aspect of the right elbow  Pain radiates down her right arm and into her right wrist  Pain is reproduced when attempting to turn her wrist or arm  She reports no alleviating factors  She does work as a  and states that carrying heavy objects makes her pain worse  She denies any numbness or tingling in the arm or hand regions  She denies any crepitus, warmth  Allergy:  Allergies   Allergen Reactions    Doxycycline     Erythromycin     Other      Mushrooms    Penicillins      Medications:  all current active meds have been reviewed  Past Medical History:  Past Medical History:   Diagnosis Date    Chronic back pain     Migraine      Past Surgical History:  Past Surgical History:   Procedure Laterality Date    APPENDECTOMY      BREAST LUMPECTOMY      TONSILLECTOMY       Family History:  Family History   Problem Relation Age of Onset    Heart failure Mother     Heart attack Father     Hypertension Brother     Diabetes Maternal Aunt     Stroke Maternal Aunt      Social History:  History   Alcohol Use No     History   Drug Use No     History   Smoking Status    Former Smoker   Smokeless Tobacco    Never Used     Review of Systems   Constitutional: Negative  HENT: Negative  Eyes: Negative  Respiratory: Negative  Cardiovascular: Negative  Gastrointestinal: Negative  Genitourinary: Negative      Musculoskeletal: Negative for arthralgias and myalgias  Skin: Negative  Allergic/Immunologic: Negative  Neurological: Negative  Hematological: Negative  Psychiatric/Behavioral: Negative  Objective:  BP Readings from Last 1 Encounters:   06/21/18 129/76      Wt Readings from Last 1 Encounters:   06/21/18 70 8 kg (156 lb)      BMI:   Estimated body mass index is 25 18 kg/m² as calculated from the following:    Height as of this encounter: 5' 6" (1 676 m)  Weight as of this encounter: 70 8 kg (156 lb)  BSA:   Estimated body surface area is 1 8 meters squared as calculated from the following:    Height as of this encounter: 5' 6" (1 676 m)  Weight as of this encounter: 70 8 kg (156 lb)  Physical Exam   Constitutional: She is oriented to person, place, and time  Vital signs are normal  She appears well-developed  HENT:   Head: Normocephalic  Eyes: Pupils are equal, round, and reactive to light  Pulmonary/Chest: Effort normal    Musculoskeletal: She exhibits tenderness  Neurological: She is alert and oriented to person, place, and time  Skin: Skin is warm and dry  Psychiatric: She has a normal mood and affect  Nursing note and vitals reviewed  Right Elbow Exam     Tenderness   The patient is experiencing tenderness in the olecranon fossa, medial epicondyle and lateral epicondyle       Range of Motion   Extension: abnormal   Flexion: normal   Pronation: normal   Supination: normal     Muscle Strength   Pronation:  4/5   Supination:  4/5     Tests Varus: negative  Valgus: negative        Other   Erythema: present  Sensation: normal  Pulse: present            Medium joint arthrocentesis  Date/Time: 6/21/2018 11:06 AM  Site marked: site marked  Supporting Documentation  Indications: pain   Procedure Details  Location: elbow - R elbow  Needle size: 25 G  Ultrasound guidance: no  Approach: anterolateral  Medications administered: 2 mL lidocaine 1 %; 40 mg triamcinolone acetonide 40 mg/mL          I have personally reviewed pertinent films in PACS  1   No fracture or osseous contusion  2   Moderate biceps insertional tendinosis without full-thickness tear or tendon retraction  3  Mild lateral epicondylitis  4   Mild degenerative changes of the elbow

## 2018-06-28 ENCOUNTER — EVALUATION (OUTPATIENT)
Dept: PHYSICAL THERAPY | Facility: MEDICAL CENTER | Age: 53
End: 2018-06-28
Payer: COMMERCIAL

## 2018-06-28 DIAGNOSIS — M25.421 EFFUSION OF RIGHT ELBOW: ICD-10-CM

## 2018-06-28 DIAGNOSIS — M25.521 PAIN IN RIGHT ELBOW: Primary | ICD-10-CM

## 2018-06-28 PROCEDURE — G8990 OTHER PT/OT CURRENT STATUS: HCPCS | Performed by: PHYSICAL THERAPIST

## 2018-06-28 PROCEDURE — 97161 PT EVAL LOW COMPLEX 20 MIN: CPT | Performed by: PHYSICAL THERAPIST

## 2018-06-28 PROCEDURE — G8991 OTHER PT/OT GOAL STATUS: HCPCS | Performed by: PHYSICAL THERAPIST

## 2018-06-28 PROCEDURE — 97140 MANUAL THERAPY 1/> REGIONS: CPT | Performed by: PHYSICAL THERAPIST

## 2018-06-28 NOTE — PROGRESS NOTES
PT Evaluation     Today's date: 2018  Patient name: Tiff Cespedes  : 1965  MRN: [id number]  Referring provider: Elise Cardenas DO  Dx:   Encounter Diagnosis     ICD-10-CM    1  Pain in right elbow M25 521 Ambulatory referral to Physical Therapy   2  Effusion of right elbow M25 421 Ambulatory referral to Physical Therapy                  Assessment  Impairments: abnormal or restricted ROM, activity intolerance, impaired physical strength, lacks appropriate home exercise program and pain with function    Assessment details: Patient is a 47 y/o female who presents with complaints of pain in the right elbow  No further referral appears necessary at this time based upon examination results  Patient presents with the following impairments: decreased range of motion, muscle spasm, decreased strength and decreased ability to perform functional tasks such as adls and work duties  Prognosis is good given HEP compliance and PT 2x/wk tapering to 1x/wk over the next 4-6 weeks  Positive prognostic indicators include positive attitude toward recovery  Negative prognostic indicators include co-morbidities  Please contact me if you have any questions or recommendations  Thank you for the opportunity to share in Mary's care  Understanding of Dx/Px/POC: good   Prognosis: good    Goals  STG  Decrease pain by 50% in 4 weeks  Increase range of motion by 10 degrees in 4 weeks  Increase strength by half a grade in 4 weeks  LTG  Patient will be independent in hep in 4 weeks  Patient will be able to perform adls at [facility] by D/C  Patient will be able to perform work duties at [employer] by D/C      Plan  Patient would benefit from: skilled physical therapy  Planned modality interventions: cryotherapy  Planned therapy interventions: functional ROM exercises, flexibility, home exercise program, joint mobilization, manual therapy, massage, neuromuscular re-education, patient education, therapeutic exercise, stretching and strengthening  Frequency: 2x week  Duration in weeks: 6  Plan of Care beginning date: 2018  Plan of Care expiration date: 2018  Treatment plan discussed with: patient        Subjective Evaluation    History of Present Illness  Date of onset: 2018  Mechanism of injury: Patient reports elbow made everyday activities nearly impossible to perform  Since injection, pain has decreased some but it still present  Patient reports hitting elbow on a door latch 2 months ago  That day it hurt but went away within a day  A few weeks ago hit same latch but not as hard but the pain stayed  Quality of life: excellent    Pain  Current pain ratin  At best pain ratin  At worst pain ratin  Quality: dull ache and radiating  Relieving factors: rest  Aggravating factors: lifting  Progression: improved    Hand dominance: right      Diagnostic Tests  X-ray: normal  MRI studies: abnormal  Treatments  Previous treatment: injection treatment  Patient Goals  Patient goals for therapy: decreased pain, increased motion, increased strength, return to work and independence with ADLs/IADLs          Objective     Palpation     Right   Muscle spasm in the pronator teres and wrist extensors  Tenderness     Right Elbow   Tenderness in the antecubital fossa, distal biceps tendon, lateral epicondyle and medial epicondyle  Right Wrist/Hand   Tenderness in the distal biceps tendon, lateral epicondyle and medial epicondyle       Active Range of Motion     Left Elbow   Flexion: 150 degrees   Extension: 0 degrees     Right Elbow   Flexion: 145 degrees   Extension: 8 degrees     Strength/Myotome Testing     Left Shoulder     Planes of Motion   Flexion: 5   Extension: 5   Abduction: 5   External rotation at 0°: 5   Internal rotation at 0°: 5     Right Shoulder     Planes of Motion   Flexion: 4   Extension: 5   Abduction: 5   External rotation at 0°: 4   Internal rotation at 0°: 4     Left Elbow   Flexion: 5  Extension: 5    Right Elbow   Flexion: 4  Extension: 4+    Left Wrist/Hand   Wrist extension: 5  Wrist flexion: 5     (2nd hand position)     Trial 1: 70    Right Wrist/Hand   Wrist extension: 4  Wrist flexion: 4+     (2nd hand position)     Trial 1: 80    Additional Strength Details  Pain with elbow flexion MMT      Flowsheet Rows      Most Recent Value   PT/OT G-Codes   Current Score  50   Projected Score  65   FOTO information reviewed  Yes   Assessment Type  Evaluation   G code set  Other PT/OT Primary   Other PT Primary Current Status ()  CK   Other PT Primary Goal Status ()  CJ          Precautions migraine, anxiety    Specialty Daily Treatment Diary     Manual  6/28       IASTM extensor muscle belly and epicondyles 15'                                           Exercise Diary  6/28       UBE        Wrist ext str        Wrist flx str        Wrist PRE        Red digiflex        therabar R bend                                                                                                                            Modalities        CP?

## 2018-07-02 ENCOUNTER — APPOINTMENT (OUTPATIENT)
Dept: PHYSICAL THERAPY | Facility: MEDICAL CENTER | Age: 53
End: 2018-07-02
Payer: COMMERCIAL

## 2018-07-03 ENCOUNTER — OFFICE VISIT (OUTPATIENT)
Dept: URGENT CARE | Facility: MEDICAL CENTER | Age: 53
End: 2018-07-03
Payer: COMMERCIAL

## 2018-07-03 VITALS
SYSTOLIC BLOOD PRESSURE: 128 MMHG | OXYGEN SATURATION: 99 % | DIASTOLIC BLOOD PRESSURE: 72 MMHG | BODY MASS INDEX: 25.24 KG/M2 | RESPIRATION RATE: 20 BRPM | TEMPERATURE: 100 F | HEART RATE: 75 BPM | WEIGHT: 156.4 LBS

## 2018-07-03 DIAGNOSIS — J20.9 ACUTE BRONCHITIS, UNSPECIFIED ORGANISM: Primary | ICD-10-CM

## 2018-07-03 PROCEDURE — 99203 OFFICE O/P NEW LOW 30 MIN: CPT | Performed by: PHYSICIAN ASSISTANT

## 2018-07-03 RX ORDER — AZITHROMYCIN 250 MG/1
TABLET, FILM COATED ORAL
Qty: 6 TABLET | Refills: 0 | Status: SHIPPED | OUTPATIENT
Start: 2018-07-03 | End: 2018-07-03 | Stop reason: SDUPTHER

## 2018-07-03 RX ORDER — AZITHROMYCIN 250 MG/1
TABLET, FILM COATED ORAL
Qty: 6 TABLET | Refills: 0 | Status: SHIPPED | COMMUNITY
Start: 2018-07-03 | End: 2018-07-07

## 2018-07-03 RX ORDER — PREDNISONE 10 MG/1
30 TABLET ORAL DAILY
Qty: 15 TABLET | Refills: 0 | Status: SHIPPED | COMMUNITY
Start: 2018-07-03 | End: 2018-07-08

## 2018-07-04 NOTE — PATIENT INSTRUCTIONS
Take prednisone 30 mg x 5 days  Take azithromycin as directed  Continue mucinex as needed for symptoms  Watch for persistent fevers  Follow up with your PCP for persistent symptoms  Go to the ER for any distress

## 2018-07-04 NOTE — PROGRESS NOTES
[facility]        NAME: Twila Mccarty is a 46 y o  female  : 1965    MRN: [id number]  DATE: July 3, 2018  TIME: 10:59 PM    Assessment and Plan   Acute bronchitis, unspecified organism [J20 9]  1  Acute bronchitis, unspecified organism  predniSONE 10 mg tablet    azithromycin (ZITHROMAX) 250 mg tablet    DISCONTINUED: azithromycin (ZITHROMAX) 250 mg tablet         Patient Instructions     Take prednisone 30 mg x 5 days  Take azithromycin as directed  Continue mucinex as needed for symptoms  Watch for persistent fevers  Follow up with PCP in 3-5 days  Proceed to  ER if symptoms worsen  Chief Complaint     Chief Complaint   Patient presents with    Cold Like Symptoms     Fever, productive cough, ear discomfort, and congestion x 3 days  History of Present Illness       This is a 46year old female presenting for URI symptoms x 5 days  Symptoms include cough productive of dark green sputum, sinus congestion, sore throat, ear pain, myalgias  She has been having fevers for 3 days with tmax 103  She has a history of asthma and has been mildly SOB  She is using mucinex  Review of Systems   Review of Systems   Constitutional: Positive for chills, fatigue and fever  HENT: Positive for congestion, ear pain, postnasal drip, rhinorrhea, sinus pressure and sore throat  Respiratory: Positive for cough and shortness of breath  Gastrointestinal: Negative for abdominal pain, nausea and vomiting  Musculoskeletal: Negative for myalgias  Skin: Negative for rash  Neurological: Negative for dizziness, weakness and light-headedness           Current Medications       Current Outpatient Prescriptions:     cetirizine (ZyrTEC) 10 mg tablet, Take 10 mg by mouth, Disp: , Rfl:     divalproex sodium (DEPAKOTE ER) 250 mg 24 hr tablet, TAKE 2 TABLETS (500 MG TOTAL) BY MOUTH DAILY AT BEDTIME, Disp: 60 tablet, Rfl: 2    gabapentin (NEURONTIN) 100 mg capsule, 100 in the am, 100 noon and 200 at night time  , Disp: 120 capsule, Rfl: 2    ketorolac (TORADOL) 10 mg tablet, Take by mouth, Disp: , Rfl:     MULTIPLE VITAMINS ESSENTIAL PO, Take by mouth, Disp: , Rfl:     prochlorperazine (COMPAZINE) 5 mg tablet, TAKE 1 TABLET BY MOUTH 3 TIMES A DAY AS NEEDED FOR HEADACHE AND FOR NAUSEA, Disp: 30 tablet, Rfl: 1    venlafaxine (EFFEXOR) 25 mg tablet, TAKE 1 TABLET TWICE DAILY, Disp: 60 tablet, Rfl: 1    azithromycin (ZITHROMAX) 250 mg tablet, Take 2 tablets today then 1 tablet daily x 4 days, Disp: 6 tablet, Rfl: 0    predniSONE 10 mg tablet, Take 3 tablets (30 mg total) by mouth daily for 5 days, Disp: 15 tablet, Rfl: 0    Current Allergies     Allergies as of 07/03/2018 - Reviewed 07/03/2018   Allergen Reaction Noted    Doxycycline  12/04/2016    Erythromycin  12/04/2016    Other  12/04/2016    Penicillins  12/04/2016            The following portions of the patient's history were reviewed and updated as appropriate: allergies, current medications, past family history, past medical history, past social history, past surgical history and problem list      Past Medical History:   Diagnosis Date    Chronic back pain     Migraine        Past Surgical History:   Procedure Laterality Date    APPENDECTOMY      BREAST LUMPECTOMY      TONSILLECTOMY         Family History   Problem Relation Age of Onset    Heart failure Mother     Heart attack Father     Hypertension Brother     Diabetes Maternal Aunt     Stroke Maternal Aunt          Medications have been verified  Objective   /72 (BP Location: Right arm, Patient Position: Sitting, Cuff Size: Standard)   Pulse 75   Temp 100 °F (37 8 °C) (Temporal)   Resp 20   Wt 70 9 kg (156 lb 6 4 oz)   SpO2 99%   BMI 25 24 kg/m²        Physical Exam     Physical Exam   Constitutional: She appears well-developed and well-nourished  No distress  HENT:   Head: Normocephalic and atraumatic     Right Ear: Tympanic membrane, external ear and ear canal normal  No drainage or tenderness  Left Ear: Tympanic membrane, external ear and ear canal normal  No drainage or tenderness  Nose: Mucosal edema and rhinorrhea present  Mouth/Throat: Uvula is midline and mucous membranes are normal  Posterior oropharyngeal erythema present  No oropharyngeal exudate or posterior oropharyngeal edema  Eyes: Conjunctivae are normal  Pupils are equal, round, and reactive to light  Neck: Normal range of motion  Neck supple  Cardiovascular: Normal rate, regular rhythm and normal heart sounds  Pulmonary/Chest: Effort normal  No respiratory distress  She has no decreased breath sounds  She has no wheezes  She has no rhonchi  She has no rales  Mildly decreased breath sounds   Lymphadenopathy:     She has cervical adenopathy  Neurological: She is alert  Skin: Skin is warm and dry  She is not diaphoretic  Nursing note and vitals reviewed

## 2018-07-05 ENCOUNTER — OFFICE VISIT (OUTPATIENT)
Dept: PHYSICAL THERAPY | Facility: MEDICAL CENTER | Age: 53
End: 2018-07-05
Payer: COMMERCIAL

## 2018-07-05 DIAGNOSIS — M25.521 PAIN IN RIGHT ELBOW: ICD-10-CM

## 2018-07-05 DIAGNOSIS — M25.421 EFFUSION OF RIGHT ELBOW: Primary | ICD-10-CM

## 2018-07-05 PROCEDURE — 97140 MANUAL THERAPY 1/> REGIONS: CPT | Performed by: PHYSICAL THERAPIST

## 2018-07-05 PROCEDURE — 97112 NEUROMUSCULAR REEDUCATION: CPT | Performed by: PHYSICAL THERAPIST

## 2018-07-05 PROCEDURE — 97110 THERAPEUTIC EXERCISES: CPT | Performed by: PHYSICAL THERAPIST

## 2018-07-05 NOTE — PROGRESS NOTES
Daily Note     Today's date: 2018  Patient name: Tangela Mayfield  : 1965  MRN: [id number]  Referring provider: Gil Ramirez DO  Dx:   Encounter Diagnosis     ICD-10-CM    1  Effusion of right elbow M25 421    2  Pain in right elbow M25 521                   Subjective: Pt reports that her level of pain/discomfort is about 3/10 but she is currently on a steroid which she feels may be contributing to her decreased symptoms  Objective: See treatment diary below      Assessment: Tolerated treatment well  Patient demonstrated fatigue post treatment, exhibited good technique with therapeutic exercises, would benefit from continued PT and did well with all exercises  She felt some discomfort in flexion PRE's         Plan: Continue per plan of care  Progress treatment as tolerated  Precautions migraine, anxiety    Specialty Daily Treatment Diary     Manual        IASTM extensor muscle belly and epicondyles 15' 15'                                          Exercise Diary        UBE  5'      Wrist ext str  15"x5      Wrist flx str  15"x5      Wrist PRE  3x10      Red digiflex  20x       therabar R bend  15x ea                                                                                                                          Modalities        CP?

## 2018-07-09 ENCOUNTER — OFFICE VISIT (OUTPATIENT)
Dept: PHYSICAL THERAPY | Facility: MEDICAL CENTER | Age: 53
End: 2018-07-09
Payer: COMMERCIAL

## 2018-07-09 DIAGNOSIS — M25.421 EFFUSION OF RIGHT ELBOW: Primary | ICD-10-CM

## 2018-07-09 DIAGNOSIS — M25.521 PAIN IN RIGHT ELBOW: ICD-10-CM

## 2018-07-09 PROCEDURE — 97112 NEUROMUSCULAR REEDUCATION: CPT | Performed by: PHYSICAL THERAPIST

## 2018-07-09 PROCEDURE — 97140 MANUAL THERAPY 1/> REGIONS: CPT | Performed by: PHYSICAL THERAPIST

## 2018-07-09 PROCEDURE — 97110 THERAPEUTIC EXERCISES: CPT | Performed by: PHYSICAL THERAPIST

## 2018-07-09 NOTE — PROGRESS NOTES
Daily Note     Today's date: 2018  Patient name: Janae Browne  : 1965  MRN: [id number]  Referring provider: Chris Mcdonald DO  Dx:   Encounter Diagnosis     ICD-10-CM    1  Effusion of right elbow M25 421    2  Pain in right elbow M25 521                   Subjective: Pt reports pain is decreased  Objective: See treatment diary below      Assessment: Tolerated treatment well  Patient demonstrated fatigue post treatment, exhibited good technique with therapeutic exercises, would benefit from continued PT and did well with all exercises  She felt some discomfort in flexion PRE's         Plan: Continue per plan of care  Progress treatment as tolerated        Precautions migraine, anxiety    Specialty Daily Treatment Diary     Manual       IASTM extensor muscle belly and epicondyles 15' 15' 15'                                         Exercise Diary       UBE  5' 6' bwd     Wrist ext str  15"x5 15" 5x     Wrist flx str  15"x5 15" 5x     Wrist PRE  3x10 3x10 1#     Red digiflex  20x  30x2     therabar R bend  15x ea 20x ea                                                                                                                         Modalities        CP 10'

## 2018-07-11 ENCOUNTER — OFFICE VISIT (OUTPATIENT)
Dept: PHYSICAL THERAPY | Facility: MEDICAL CENTER | Age: 53
End: 2018-07-11
Payer: COMMERCIAL

## 2018-07-11 DIAGNOSIS — F41.9 ANXIETY: ICD-10-CM

## 2018-07-11 DIAGNOSIS — M25.421 EFFUSION OF RIGHT ELBOW: Primary | ICD-10-CM

## 2018-07-11 DIAGNOSIS — M25.521 PAIN IN RIGHT ELBOW: ICD-10-CM

## 2018-07-11 PROCEDURE — 97140 MANUAL THERAPY 1/> REGIONS: CPT | Performed by: PHYSICAL THERAPIST

## 2018-07-11 PROCEDURE — 97112 NEUROMUSCULAR REEDUCATION: CPT | Performed by: PHYSICAL THERAPIST

## 2018-07-11 RX ORDER — VENLAFAXINE 25 MG/1
TABLET ORAL
Qty: 60 TABLET | Refills: 1 | Status: SHIPPED | OUTPATIENT
Start: 2018-07-11 | End: 2018-09-09 | Stop reason: SDUPTHER

## 2018-07-11 NOTE — PROGRESS NOTES
Daily Note     Today's date: 2018  Patient name: Lyle Fritz  : 1965  MRN: [id number]  Referring provider: Vaishnavi Acuña DO  Dx:   Encounter Diagnosis     ICD-10-CM    1  Effusion of right elbow M25 421    2  Pain in right elbow M25 521                   Subjective: Pt offers no new complaints  Objective: See treatment diary below      Assessment: Tolerated treatment well  Patient demonstrated fatigue post treatment, exhibited good technique with therapeutic exercises and would benefit from continued PT      Plan: Continue per plan of care  Progress treatment as tolerated        Precautions migraine, anxiety    Specialty Daily Treatment Diary     Manual      IASTM extensor muscle belly and epicondyles 15' 15' 15' 15'                                        Exercise Diary      UBE  5' 6' bwd 6' bwd    Wrist ext str  15"x5 15" 5x 15" 5x    Wrist flx str  15"x5 15" 5x 15" 5x    Wrist PRE  3x10 3x10 1# 3x10 1#    Red digiflex  20x  30x2 30x2    therabar R bend  15x ea 20x ea 20x ea                                                                                                                        Modalities        CP 10'

## 2018-07-16 ENCOUNTER — OFFICE VISIT (OUTPATIENT)
Dept: PHYSICAL THERAPY | Facility: MEDICAL CENTER | Age: 53
End: 2018-07-16
Payer: COMMERCIAL

## 2018-07-16 DIAGNOSIS — M25.521 PAIN IN RIGHT ELBOW: ICD-10-CM

## 2018-07-16 DIAGNOSIS — M25.421 EFFUSION OF RIGHT ELBOW: Primary | ICD-10-CM

## 2018-07-16 PROCEDURE — 97140 MANUAL THERAPY 1/> REGIONS: CPT | Performed by: PHYSICAL THERAPIST

## 2018-07-16 PROCEDURE — 97112 NEUROMUSCULAR REEDUCATION: CPT | Performed by: PHYSICAL THERAPIST

## 2018-07-16 PROCEDURE — 97110 THERAPEUTIC EXERCISES: CPT | Performed by: PHYSICAL THERAPIST

## 2018-07-19 ENCOUNTER — OFFICE VISIT (OUTPATIENT)
Dept: PHYSICAL THERAPY | Facility: MEDICAL CENTER | Age: 53
End: 2018-07-19
Payer: COMMERCIAL

## 2018-07-19 DIAGNOSIS — M25.521 PAIN IN RIGHT ELBOW: ICD-10-CM

## 2018-07-19 DIAGNOSIS — M25.421 EFFUSION OF RIGHT ELBOW: Primary | ICD-10-CM

## 2018-07-19 PROCEDURE — 97140 MANUAL THERAPY 1/> REGIONS: CPT

## 2018-07-19 PROCEDURE — 97112 NEUROMUSCULAR REEDUCATION: CPT

## 2018-07-19 PROCEDURE — 97110 THERAPEUTIC EXERCISES: CPT

## 2018-07-19 NOTE — PROGRESS NOTES
Daily Note     Today's date: 2018  Patient name: Cesar Haider  : 1965  MRN: [id number]  Referring provider: Zhanna Lopez DO  Dx:   Encounter Diagnosis     ICD-10-CM    1  Effusion of right elbow M25 421    2  Pain in right elbow M25 521        Start Time: 1705  Stop Time: 1748  Total time in clinic (min): 43 minutes    Subjective:  Patient reports has been feeling better  Decreased pain 4/10      Objective: See treatment diary below  Precautions migraine, anxiety     Specialty Daily Treatment Diary      Manual     IASTM extensor muscle belly and epicondyles 15' 15' 15' 15' 15' 10'   Radial head mobs         5' 5'                                                      Exercise Diary     UBE   5' 6' bwd 6' bwd 6' bwd 6' bwd   Wrist ext str   15"x5 15" 5x 15" 5x 15" 5x 15" x 5   Wrist flx str   15"x5 15" 5x 15" 5x 15" 5x 5 " x 5   Wrist PRE   3x10 3x10 1# 3x10 1# 3x10 2# 3x 10 2 #   Red digiflex   20x  30x2 30x2 G 30x2 Green 30 x 2   therabar R bend supination/pronation   15x ea 20x ea 20x ea 20x ea 20 x 3ach   therabar R twists         15x ea 15 x ea                                                                                                                                                                                                           Modalities            CP 10'                                             Assessment: Tolerated treatment well  Patient exhibited good technique with therapeutic exercises and would benefit from continued PT      Plan: Continue per plan of care  Progress treatment as tolerated

## 2018-07-23 ENCOUNTER — OFFICE VISIT (OUTPATIENT)
Dept: PHYSICAL THERAPY | Facility: MEDICAL CENTER | Age: 53
End: 2018-07-23
Payer: COMMERCIAL

## 2018-07-23 DIAGNOSIS — M25.421 EFFUSION OF RIGHT ELBOW: Primary | ICD-10-CM

## 2018-07-23 DIAGNOSIS — M25.521 PAIN IN RIGHT ELBOW: ICD-10-CM

## 2018-07-23 PROCEDURE — 97112 NEUROMUSCULAR REEDUCATION: CPT | Performed by: PHYSICAL THERAPIST

## 2018-07-23 PROCEDURE — 97140 MANUAL THERAPY 1/> REGIONS: CPT | Performed by: PHYSICAL THERAPIST

## 2018-07-23 NOTE — PROGRESS NOTES
Daily Note     Today's date: 2018  Patient name: Tangela Mayfield  : 1965  MRN: [id number]  Referring provider: Gil Ramirez DO  Dx:   Encounter Diagnosis     ICD-10-CM    1  Effusion of right elbow M25 421    2  Pain in right elbow M25 521                   Subjective:  Patient reports has been feeling better  Objective: See treatment diary below  Precautions migraine, anxiety     Specialty Daily Treatment Diary      Manual     IASTM extensor muscle belly and epicondyles 10' 15' 15' 15' 15' 10'   Radial head mobs  5'       5' 5'                                                      Exercise Diary     UBE  6' bwd 5' 6' bwd 6' bwd 6' bwd 6' bwd   Wrist ext str  15" 5x 15"x5 15" 5x 15" 5x 15" 5x 15" x 5   Wrist flx str  15" 5x 15"x5 15" 5x 15" 5x 15" 5x 5 " x 5   Wrist PRE  3x10 3# 3x10 3x10 1# 3x10 1# 3x10 2# 3x 10 2 #   Red digiflex  G 30x2 20x  30x2 30x2 G 30x2 Green 30 x 2   therabar R bend supination/pronation  20x2 each 15x ea 20x ea 20x ea 20x ea 20 x 3ach   therabar R twists  20x ea       15x ea 15 x ea                                                                                                                                                                                                           Modalities            CP 10'                                             Assessment: Tolerated treatment well  Patient exhibited good technique with therapeutic exercises and would benefit from continued PT  Less restrictions with IASTM  Plan: Continue per plan of care  Progress treatment as tolerated

## 2018-07-26 ENCOUNTER — EVALUATION (OUTPATIENT)
Dept: PHYSICAL THERAPY | Facility: MEDICAL CENTER | Age: 53
End: 2018-07-26
Payer: COMMERCIAL

## 2018-07-26 DIAGNOSIS — M25.421 EFFUSION OF RIGHT ELBOW: Primary | ICD-10-CM

## 2018-07-26 DIAGNOSIS — M25.521 PAIN IN RIGHT ELBOW: ICD-10-CM

## 2018-07-26 PROCEDURE — 97112 NEUROMUSCULAR REEDUCATION: CPT | Performed by: PHYSICAL THERAPIST

## 2018-07-26 PROCEDURE — G8991 OTHER PT/OT GOAL STATUS: HCPCS | Performed by: PHYSICAL THERAPIST

## 2018-07-26 PROCEDURE — 97140 MANUAL THERAPY 1/> REGIONS: CPT | Performed by: PHYSICAL THERAPIST

## 2018-07-26 PROCEDURE — 97110 THERAPEUTIC EXERCISES: CPT | Performed by: PHYSICAL THERAPIST

## 2018-07-26 PROCEDURE — G8992 OTHER PT/OT  D/C STATUS: HCPCS | Performed by: PHYSICAL THERAPIST

## 2018-07-26 NOTE — PROGRESS NOTES
PT Re-Evaluation  and PT Discharge    Today's date: 2018  Patient name: Jeromy Corado  : 1965  MRN: [id number]  Referring provider: Leah Barnett DO  Dx:   Encounter Diagnosis     ICD-10-CM    1  Effusion of right elbow M25 421    2  Pain in right elbow M25 521                   Assessment  Impairments: pain with function    Assessment details: Patient has made significant progress towards short and long term goals since beginning physical therapy  At this time, patient is confident in hep and will be able to continue on own  Patient was given comprehensive hep and acknowledges understanding  Patient encouraged to return to physical therapy if necessary  Understanding of Dx/Px/POC: good   Prognosis: good    Goals  STG  Decrease pain by 50% in 4 weeks  met  Increase range of motion by 10 degrees in 4 weeks  Increase strength by half a grade in 4 weeks  LTG  Patient will be independent in hep in 4 weeks  met  Patient will be able to perform adls at [facility] by D/C  met  Patient will be able to perform work duties at [employer] by D/C  met    Plan  Patient would benefit from: skilled physical therapy  Planned therapy interventions: home exercise program and patient education  Treatment plan discussed with: patient  Plan details: D/C to HEP  Subjective Evaluation    History of Present Illness  Date of onset: 2018  Mechanism of injury: Patient reports overall elbow has been doing pretty good  Notes a little tenderness but it is continuing to heal  Patient does not note much pain from it anymore    Quality of life: excellent    Pain  Current pain ratin  At best pain ratin  At worst pain rating: 3  Quality: dull ache  Relieving factors: rest  Aggravating factors: lifting  Progression: improved    Hand dominance: right      Diagnostic Tests  X-ray: normal  MRI studies: abnormal  Treatments  Previous treatment: injection treatment  Patient Goals  Patient goals for therapy: decreased pain, increased motion, increased strength, return to work and independence with ADLs/IADLs          Objective     Tenderness     Right Elbow   No tenderness in the antecubital fossa, distal biceps tendon, lateral epicondyle and medial epicondyle  Right Wrist/Hand   No tenderness in the distal biceps tendon, lateral epicondyle and medial epicondyle  Active Range of Motion     Left Elbow   Flexion: 150 degrees   Extension: 0 degrees     Right Elbow   Flexion: 150 degrees   Extension: 0 degrees     Strength/Myotome Testing     Left Shoulder     Planes of Motion   Flexion: 5   Extension: 5   Abduction: 5   External rotation at 0°: 5   Internal rotation at 0°: 5     Right Shoulder     Planes of Motion   Flexion: 5   Extension: 5   Abduction: 5   External rotation at 0°: 5   Internal rotation at 0°: 4     Left Elbow   Flexion: 5  Extension: 5    Right Elbow   Flexion: 5  Extension: 5    Left Wrist/Hand   Wrist extension: 5  Wrist flexion: 5     (2nd hand position)     Trial 1: 70    Right Wrist/Hand   Wrist extension: 5  Wrist flexion: 5     (2nd hand position)     Trial 1: 90    Additional Strength Details  No pain        Flowsheet Rows      Most Recent Value   PT/OT G-Codes   Current Score  98   Projected Score  65   FOTO information reviewed  Yes   Assessment Type  Discharge   G code set  Other PT/OT Primary   Other PT Primary Goal Status ()  CJ   Other PT Primary Discharge Status ()  CI       Precautions migraine, anxiety     Specialty Daily Treatment Diary      Manual  7/23 7/5 7/9 7/11 7/16 7/19   IASTM extensor muscle belly and epicondyles 10' 15' 15' 15' 15' 10'   Radial head mobs  5'       5' 5'                                                         Exercise Diary  7/23 7/26 7/9 7/11 7/16 7/19   UBE  6' bwd 6' 6' bwd 6' bwd 6' bwd 6' bwd   Wrist ext str  15" 5x hep 15" 5x 15" 5x 15" 5x 15" x 5   Wrist flx str  15" 5x hep 15" 5x 15" 5x 15" 5x 5 " x 5   Wrist PRE  3x10 3# hep 3x10 1# 3x10 1# 3x10 2# 3x 10 2 #   Red digiflex  G 30x2 hep 30x2 30x2 G 30x2 Green 30 x 2   therabar R bend supination/pronation  20x2 each 20x ea G 20x ea 20x ea 20x ea 20 x 3ach   therabar R twists  20x ea  20x ea     15x ea 15 x ea                                                                                                                                                                                                                         Modalities 7/9           CP 10'

## 2018-07-28 DIAGNOSIS — G43.109 VERTIGINOUS MIGRAINE: ICD-10-CM

## 2018-07-30 RX ORDER — DIVALPROEX SODIUM 250 MG/1
500 TABLET, EXTENDED RELEASE ORAL
Qty: 60 TABLET | Refills: 2 | Status: SHIPPED | OUTPATIENT
Start: 2018-07-30 | End: 2018-10-20 | Stop reason: SDUPTHER

## 2018-08-11 DIAGNOSIS — G43.109 VERTIGINOUS MIGRAINE: ICD-10-CM

## 2018-08-13 RX ORDER — GABAPENTIN 100 MG/1
CAPSULE ORAL
Qty: 120 CAPSULE | Refills: 5 | Status: SHIPPED | OUTPATIENT
Start: 2018-08-13 | End: 2018-11-05 | Stop reason: SDUPTHER

## 2018-09-09 DIAGNOSIS — R42 VERTIGO: ICD-10-CM

## 2018-09-09 DIAGNOSIS — F41.9 ANXIETY: ICD-10-CM

## 2018-09-10 RX ORDER — PROCHLORPERAZINE MALEATE 5 MG/1
TABLET ORAL
Qty: 30 TABLET | Refills: 1 | Status: SHIPPED | OUTPATIENT
Start: 2018-09-10 | End: 2020-02-14 | Stop reason: SDUPTHER

## 2018-09-10 RX ORDER — VENLAFAXINE 25 MG/1
TABLET ORAL
Qty: 60 TABLET | Refills: 1 | Status: SHIPPED | OUTPATIENT
Start: 2018-09-10 | End: 2018-10-31 | Stop reason: SDUPTHER

## 2018-10-08 ENCOUNTER — OFFICE VISIT (OUTPATIENT)
Dept: NEUROLOGY | Facility: CLINIC | Age: 53
End: 2018-10-08
Payer: COMMERCIAL

## 2018-10-08 VITALS
HEIGHT: 66 IN | DIASTOLIC BLOOD PRESSURE: 82 MMHG | BODY MASS INDEX: 26.03 KG/M2 | SYSTOLIC BLOOD PRESSURE: 144 MMHG | HEART RATE: 62 BPM | WEIGHT: 162 LBS

## 2018-10-08 DIAGNOSIS — R42 VERTIGO: ICD-10-CM

## 2018-10-08 DIAGNOSIS — Z77.29 CARBON MONOXIDE EXPOSURE: ICD-10-CM

## 2018-10-08 DIAGNOSIS — G43.109 VERTIGINOUS MIGRAINE: ICD-10-CM

## 2018-10-08 DIAGNOSIS — G43.709 CHRONIC MIGRAINE WITHOUT AURA WITHOUT STATUS MIGRAINOSUS, NOT INTRACTABLE: Primary | ICD-10-CM

## 2018-10-08 PROCEDURE — 99215 OFFICE O/P EST HI 40 MIN: CPT | Performed by: PHYSICIAN ASSISTANT

## 2018-10-08 RX ORDER — TOPIRAMATE 25 MG/1
TABLET ORAL
Qty: 120 TABLET | Refills: 2 | Status: SHIPPED | OUTPATIENT
Start: 2018-10-08 | End: 2018-12-30 | Stop reason: SDUPTHER

## 2018-10-08 NOTE — PATIENT INSTRUCTIONS
Try Topamax  Depakote- 250 mg qhs x 1 week, then trial topamax, then wean off of depakote if headaches no  Stay on gabapentin for now  Consider vestibular therapy/ PT for vertigo if it gets worse

## 2018-10-08 NOTE — PROGRESS NOTES
Patient ID: Blake Linda is a 46 y o  female  Assessment/Plan:    No problem-specific Assessment & Plan notes found for this encounter  Diagnoses and all orders for this visit:    Chronic migraine without aura without status migrainosus, not intractable  -     topiramate (TOPAMAX) 25 mg tablet; 1 tab qhs x 5 days, then 2 tabs qhs x 5 days, then 3 tabs qhs x 5 days, then 4 tabs qhs    Vertiginous migraine    Vertigo    Carbon monoxide exposure          Migraines are less severe with the current regimen  Due to side effects to depakote (possible weight gain and day time sedation), she prefers to try something different  She agreed to trial topamax instead  Should she develop s/e to topamax (reviewed today in detail with her) she will call me and will consider Lamictal qhs  Should headaches worsen at anytime during the process of weaning depakote and titrating topamax, she will contact me immediately and will likely resume the former medication depending on the situation  Continues gabapentin for now: 100 am/ 100 noon/ 200 evening  Denies s/e  She thinks this is helpful  Vertigo stable  No longer vertigo with the headaches  She rarely experiences BPV  I asked her to consider formal therapy for this and she will consider  Subjective:    HPI    Ms Yudith Sorensen is seen in follow up for chronic migraines and states is doing well  States migraines now couple times a week maximum vs 4-5x a week prior to Depakote and gabapentin  States easily aborted with Toradol and states much less severe now  States does not use Excedrin any longer as recommended last visit  Verapamil gave her heart palpitations thus stopped this as recommended by us prior to her last visit here  She is on Depakote 500 mg nightly which helps but posibly causing some weight gain and daytime sedation  Has some trouble focusing at work  She is a   No further serious vertigo spells   She reports mild/ transient vertigo when she turns her head fast to the right or left, but only on rare occasions  No clear trigger or pattern for this  States in the past an ER doctor showed her vestibular exercises to do and this seems to help a bit  Denies dysphagia  Repeat imaging reviewed and no new findings  She continues aspirin 81 mg daily  She is noticing more irritability which she thinks is related to menopause; she started effexor per pcp for this  Denies s/e  Thinks it helps the mood changes a little bit  The following portions of the patient's history were reviewed and updated as appropriate: allergies, current medications, past family history, past medical history, past social history, past surgical history and problem list     Objective:    Blood pressure 144/82, pulse 62, height 5' 6" (1 676 m), weight 73 5 kg (162 lb)  Physical Exam    Neurological Exam  Vital signs reviewed  Well developed, well nourished  Head: Normocephalic, atraumatic  CN 7-09: intact and symmetric, including EOMs which are normal b/l and PERRL; no nystagmus  MSK: 5/5 t/o  ROM normal x all 4 extr  Sensation: Inact to LT and temp x4 extr  Reflexes: 2+ and symmetric in all 4 extr  Coordination: Nml x4 extr  Gait: Steady normal gait  ROS:    Review of Systems   Constitutional: Negative  Negative for appetite change and fever  HENT: Negative  Negative for hearing loss, tinnitus, trouble swallowing and voice change  Eyes: Negative  Negative for photophobia and pain  Respiratory: Negative  Negative for shortness of breath  Cardiovascular: Negative  Negative for palpitations  Gastrointestinal: Negative  Negative for nausea and vomiting  Endocrine: Negative  Negative for cold intolerance and heat intolerance  Genitourinary: Negative  Negative for dysuria, frequency and urgency  Musculoskeletal: Negative  Negative for myalgias and neck pain  Skin: Negative  Negative for rash     Neurological: Positive for headaches  Negative for dizziness, tremors, seizures, syncope, facial asymmetry, speech difficulty, weakness, light-headedness and numbness  Hematological: Negative  Does not bruise/bleed easily  Psychiatric/Behavioral: Negative  Negative for confusion, hallucinations and sleep disturbance  Review of systems, Past medical history, Surgical history, Family history, Social history and Medication history were reviewed and otherwise unremarkable from a neurological perspective

## 2018-10-11 ENCOUNTER — TELEPHONE (OUTPATIENT)
Dept: FAMILY MEDICINE CLINIC | Facility: CLINIC | Age: 53
End: 2018-10-11

## 2018-10-11 NOTE — TELEPHONE ENCOUNTER
Ok to increase to that dose  Please have patient schedule a 3-6 month f/u with me or Dr Joy Jeffery, thanks!

## 2018-10-11 NOTE — TELEPHONE ENCOUNTER
Patient saw her neurologist and she was told her to check with her PCP regarding increasing the  [facility] to 3 pills daily, 2 in am & 1 at bedtime to help with the patient's migraines  Please advise        Cara Hinson [phone]

## 2018-10-20 DIAGNOSIS — G43.109 VERTIGINOUS MIGRAINE: ICD-10-CM

## 2018-10-23 RX ORDER — DIVALPROEX SODIUM 250 MG/1
500 TABLET, EXTENDED RELEASE ORAL
Qty: 60 TABLET | Refills: 2 | Status: SHIPPED | OUTPATIENT
Start: 2018-10-23 | End: 2019-03-01

## 2018-10-31 ENCOUNTER — TELEPHONE (OUTPATIENT)
Dept: FAMILY MEDICINE CLINIC | Facility: CLINIC | Age: 53
End: 2018-10-31

## 2018-10-31 DIAGNOSIS — F41.9 ANXIETY: ICD-10-CM

## 2018-10-31 RX ORDER — VENLAFAXINE 25 MG/1
TABLET ORAL
Qty: 90 TABLET | Refills: 3 | Status: SHIPPED | OUTPATIENT
Start: 2018-10-31 | End: 2019-03-01 | Stop reason: SDUPTHER

## 2018-10-31 NOTE — TELEPHONE ENCOUNTER
Patient is asking if she can have a refill on her prescription for Effexor with the correct instructions  She stated prescription was changed to 3 pills daily  Please advise  Patient has an apt in Feb  Is that ok or do you need to see her sooner? Patient would like a return call

## 2018-11-03 DIAGNOSIS — F41.9 ANXIETY: ICD-10-CM

## 2018-11-03 DIAGNOSIS — G43.109 VERTIGINOUS MIGRAINE: ICD-10-CM

## 2018-11-03 RX ORDER — VENLAFAXINE 25 MG/1
TABLET ORAL
Qty: 60 TABLET | Refills: 1 | OUTPATIENT
Start: 2018-11-03

## 2018-11-05 RX ORDER — GABAPENTIN 100 MG/1
CAPSULE ORAL
Qty: 120 CAPSULE | Refills: 2 | Status: SHIPPED | OUTPATIENT
Start: 2018-11-05 | End: 2019-03-28 | Stop reason: SDUPTHER

## 2018-12-30 DIAGNOSIS — IMO0002 CHRONIC MIGRAINE: Primary | ICD-10-CM

## 2018-12-30 DIAGNOSIS — G43.709 CHRONIC MIGRAINE WITHOUT AURA WITHOUT STATUS MIGRAINOSUS, NOT INTRACTABLE: ICD-10-CM

## 2018-12-31 RX ORDER — KETOROLAC TROMETHAMINE 10 MG/1
TABLET, FILM COATED ORAL
Qty: 30 TABLET | Refills: 0 | Status: SHIPPED | OUTPATIENT
Start: 2018-12-31 | End: 2020-02-14 | Stop reason: SDUPTHER

## 2018-12-31 RX ORDER — TOPIRAMATE 25 MG/1
100 TABLET ORAL
Qty: 120 TABLET | Refills: 2 | Status: SHIPPED | OUTPATIENT
Start: 2018-12-31 | End: 2019-01-04 | Stop reason: DRUGHIGH

## 2019-01-03 ENCOUNTER — OFFICE VISIT (OUTPATIENT)
Dept: URGENT CARE | Facility: MEDICAL CENTER | Age: 54
End: 2019-01-03
Payer: COMMERCIAL

## 2019-01-03 VITALS
HEIGHT: 66 IN | DIASTOLIC BLOOD PRESSURE: 88 MMHG | TEMPERATURE: 98.8 F | BODY MASS INDEX: 24.63 KG/M2 | WEIGHT: 153.25 LBS | HEART RATE: 76 BPM | SYSTOLIC BLOOD PRESSURE: 133 MMHG | RESPIRATION RATE: 20 BRPM | OXYGEN SATURATION: 96 %

## 2019-01-03 DIAGNOSIS — J11.1 INFLUENZA: Primary | ICD-10-CM

## 2019-01-03 PROCEDURE — 99213 OFFICE O/P EST LOW 20 MIN: CPT | Performed by: PHYSICIAN ASSISTANT

## 2019-01-03 RX ORDER — OSELTAMIVIR PHOSPHATE 75 MG/1
75 CAPSULE ORAL EVERY 12 HOURS SCHEDULED
Qty: 10 CAPSULE | Refills: 0 | Status: SHIPPED | OUTPATIENT
Start: 2019-01-03 | End: 2019-01-08

## 2019-01-03 NOTE — LETTER
January 3, 2019     Patient: Deanna James   YOB: 1965   Date of Visit: 1/3/2019       To Whom It May Concern: It is my medical opinion that Gregg Blakely may return to work on 01/07/2019  If you have any questions or concerns, please don't hesitate to call           Sincerely,        Tad Jolley PA-C    CC: Deanna James

## 2019-01-03 NOTE — PROGRESS NOTES
[facility]        NAME: Lani Miller is a 48 y o  female  : 1965    MRN: [id number]  DATE: January 3, 2019  TIME: 11:11 AM    Assessment and Plan   Influenza [J11 1]  1  Influenza  oseltamivir (TAMIFLU) 75 mg capsule         Patient Instructions     1  Increase fluids  2  Tylenol or Motrin as needed for fever/body aches  3  Take Tamiflu 75mg  1 tablet twice daily x 5 days  4  Follow up with PCP in 3-5 days if symptoms persist  5  Patient declined influenza testing      Chief Complaint     Chief Complaint   Patient presents with    Influenza     x 2 days ago, fever of 102 3, chills coughing and expectorating green sputum,congestion and runny nose  History of Present Illness       The patient is a 54-year-old female presents with a 2 day history of acute onset fever, chills, body aches, nasal discharge and cough  She denies any vomiting or diarrhea since the onset of her symptoms  Review of Systems   Review of Systems   Constitutional: Positive for chills, fatigue and fever  HENT: Positive for congestion and postnasal drip  Respiratory: Positive for cough  Gastrointestinal: Negative  Current Medications       Current Outpatient Prescriptions:     cetirizine (ZyrTEC) 10 mg tablet, Take 10 mg by mouth, Disp: , Rfl:     gabapentin (NEURONTIN) 100 mg capsule, 1 CAPSULE IN THE MORNING, 1 CAPSULE AT NOON AND 2 CAPSULES AT NIGHT TIME , Disp: 120 capsule, Rfl: 2    ketorolac (TORADOL) 10 mg tablet, TAKE 1 TABLET BY MOUTH 3 TIMES A DAY AT ONSET OF HEADACHE  MAX 3 TIMES A WEEK, Disp: 30 tablet, Rfl: 0    MULTIPLE VITAMINS ESSENTIAL PO, Take by mouth, Disp: , Rfl:     prochlorperazine (COMPAZINE) 5 mg tablet, TAKE 1 TABLET BY MOUTH 3 TIMES A DAY AS NEEDED FOR HEADACHE AND FOR NAUSEA, Disp: 30 tablet, Rfl: 1    topiramate (TOPAMAX) 25 mg tablet, Take 4 tablets (100 mg total) by mouth daily at bedtime, Disp: 120 tablet, Rfl: 2    venlafaxine (EFFEXOR) 25 mg tablet, 2 tabs in am, 1 in pm, Disp: 90 tablet, Rfl: 3    divalproex sodium (DEPAKOTE ER) 250 mg 24 hr tablet, TAKE 2 TABLETS (500 MG TOTAL) BY MOUTH DAILY AT BEDTIME (Patient not taking: Reported on 1/3/2019 ), Disp: 60 tablet, Rfl: 2    oseltamivir (TAMIFLU) 75 mg capsule, Take 1 capsule (75 mg total) by mouth every 12 (twelve) hours for 5 days, Disp: 10 capsule, Rfl: 0    Current Allergies     Allergies as of 01/03/2019 - Reviewed 01/03/2019   Allergen Reaction Noted    Doxycycline  12/04/2016    Erythromycin  12/04/2016    Other  12/04/2016    Penicillins  12/04/2016            The following portions of the patient's history were reviewed and updated as appropriate: allergies, current medications, past family history, past medical history, past social history, past surgical history and problem list      Past Medical History:   Diagnosis Date    Asthma     Chronic back pain     Lump of skin     last assessed 11/21/13    Migraine     Sinus bradycardia     last assessed 10/25/16       Past Surgical History:   Procedure Laterality Date    APPENDECTOMY      BREAST LUMPECTOMY      TONSILLECTOMY         Family History   Problem Relation Age of Onset    Heart failure Mother         CHF    Heart attack Father     Hypertension Brother     Diabetes Maternal Aunt     Stroke Maternal Aunt     Arthritis Family          Medications have been verified  Objective   /88   Pulse 76   Temp 98 8 °F (37 1 °C) (Temporal)   Resp 20   Ht 5' 6" (1 676 m)   Wt 69 5 kg (153 lb 4 oz)   SpO2 96%   BMI 24 74 kg/m²        Physical Exam     Physical Exam   Constitutional: She appears well-developed and well-nourished  No distress  HENT:   Head: Normocephalic and atraumatic  Right Ear: Tympanic membrane and ear canal normal    Left Ear: Tympanic membrane and ear canal normal    Nose: Rhinorrhea present     Mouth/Throat: Uvula is midline, oropharynx is clear and moist and mucous membranes are normal  Cardiovascular: Normal rate, regular rhythm and normal heart sounds  No murmur heard    Pulmonary/Chest: Effort normal and breath sounds normal

## 2019-01-03 NOTE — PATIENT INSTRUCTIONS
1  Increase fluids  2  Tylenol or Motrin as needed for fever/body aches  3  Take Tamiflu 75mg  1 tablet twice daily x 5 days  4  Follow up with PCP in 3-5 days if symptoms persist  5   Patient declined influenza testing

## 2019-01-04 DIAGNOSIS — G43.009 MIGRAINE WITHOUT AURA AND WITHOUT STATUS MIGRAINOSUS, NOT INTRACTABLE: Primary | ICD-10-CM

## 2019-01-04 NOTE — TELEPHONE ENCOUNTER
Received fax from pharmacy, topamax 25mg tabs not avail @this time  I have written for 100mg tabs for equivalent dose  If agreeable please sign off    Clinical team: Please update pt so she is aware of tablet size form

## 2019-01-07 RX ORDER — TOPIRAMATE 100 MG/1
100 TABLET, FILM COATED ORAL
Qty: 30 TABLET | Refills: 3 | Status: SHIPPED | OUTPATIENT
Start: 2019-01-07 | End: 2019-03-01 | Stop reason: ALTCHOICE

## 2019-01-15 ENCOUNTER — OFFICE VISIT (OUTPATIENT)
Dept: NEUROLOGY | Facility: CLINIC | Age: 54
End: 2019-01-15
Payer: COMMERCIAL

## 2019-01-15 VITALS
DIASTOLIC BLOOD PRESSURE: 81 MMHG | SYSTOLIC BLOOD PRESSURE: 128 MMHG | BODY MASS INDEX: 25.41 KG/M2 | WEIGHT: 157.4 LBS | HEART RATE: 58 BPM

## 2019-01-15 DIAGNOSIS — G43.109 VERTIGINOUS MIGRAINE: ICD-10-CM

## 2019-01-15 DIAGNOSIS — Z77.29 CARBON MONOXIDE EXPOSURE: ICD-10-CM

## 2019-01-15 DIAGNOSIS — G43.709 CHRONIC MIGRAINE WITHOUT AURA WITHOUT STATUS MIGRAINOSUS, NOT INTRACTABLE: Primary | ICD-10-CM

## 2019-01-15 DIAGNOSIS — R42 VERTIGO: ICD-10-CM

## 2019-01-15 PROCEDURE — 99213 OFFICE O/P EST LOW 20 MIN: CPT | Performed by: PHYSICIAN ASSISTANT

## 2019-01-15 NOTE — PROGRESS NOTES
Patient ID: Cyn Campo is a 48 y o  female  Assessment/Plan:     Problem List Items Addressed This Visit        Cardiovascular and Mediastinum    Chronic migraine without aura without status migrainosus, not intractable - Primary       Other    Carbon monoxide exposure    Vertiginous migraine    Vertigo             For chronic migraine prevention, which are improved, continue gabapentin 100/100/200 mg and Topamax 100 q h s  Hold Depakote; this seemed to cause weight gain and other side effects  Should topamax continue to cause mild n/t despite increasing potassium rich foods, discussed today, she could switch to trokendi xr  She will let me know  We also discussed supplements she could try and the doses: magnesium riboflavin, and B12  Toradol p r n  Headache, add Compazine if needed, no more than 2-3 analgesic doses per week to prevent medication overuse headache  She is also taking 50 mg a m / 25 mg p m  Effexor, which is also sometimes effective for chronic migraine prevention  She takes this for depression  Could potentially be increased in the future if needed for migraines  Vertigo seems controlled at this time  She will call and let me know if vertigo starts to become more of a problem  Subjective:    HPI    Cyn Campo is seen in follow up for chronic migraines   She works for the [employer] as a   Since last seen she weaned and stopped Depakote due to sedation and waking, and started the Topamax  She feels that the Topamax is working a little bit better for migraine prevention and denies side effects except for mild n/t in the extremities at times; she does not think it is causing cognitive deficits  She has much fewer and less severe headaches, but when she gets 1 she will usually take Toradol plus or minus Compazine for nausea which works well  Also continues gabapentin without side effects    States does not use Excedrin any longer as recommended at the last visit      Verapamil gave her heart palpitations thus stopped this as recommended by us prior to her last visit here      No further serious vertigo spells  She reports mild/ transient vertigo when she turns her head fast to the right or left, but only on rare occasions  No clear trigger or pattern for this  States in the past an ER doctor showed her vestibular exercises to do and this seems to help a bit      Repeat imaging reviewed and no new findings      She continues aspirin 81 mg daily      She is noticing more irritability which she thinks is related to menopause; she started effexor per pcp for this  Denies s/e  Thinks it helps the mood changes a little bit  The following portions of the patient's history were reviewed and updated as appropriate:   She  has a past medical history of Asthma; Chronic back pain; Lump of skin; Migraine; and Sinus bradycardia  She   Patient Active Problem List    Diagnosis Date Noted    Chronic migraine without aura without status migrainosus, not intractable 10/08/2018    Pain in right elbow 06/14/2018    Effusion of right elbow 06/14/2018    Carbon monoxide exposure 02/06/2018    Vertiginous migraine 02/06/2018    Vertigo 02/06/2018     She  has a past surgical history that includes Appendectomy; Tonsillectomy; and Breast lumpectomy  Her family history includes Arthritis in her family; Diabetes in her maternal aunt; Heart attack in her father; Heart failure in her mother; Hypertension in her brother; Stroke in her maternal aunt  She  reports that she has quit smoking  She has never used smokeless tobacco  She reports that she does not drink alcohol or use drugs  Current Outpatient Prescriptions   Medication Sig Dispense Refill    cetirizine (ZyrTEC) 10 mg tablet Take 10 mg by mouth      gabapentin (NEURONTIN) 100 mg capsule 1 CAPSULE IN THE MORNING, 1 CAPSULE AT NOON AND 2 CAPSULES AT NIGHT TIME   120 capsule 2    ketorolac (TORADOL) 10 mg tablet TAKE 1 TABLET BY MOUTH 3 TIMES A DAY AT ONSET OF HEADACHE  MAX 3 TIMES A WEEK 30 tablet 0    MULTIPLE VITAMINS ESSENTIAL PO Take by mouth      prochlorperazine (COMPAZINE) 5 mg tablet TAKE 1 TABLET BY MOUTH 3 TIMES A DAY AS NEEDED FOR HEADACHE AND FOR NAUSEA 30 tablet 1    topiramate (TOPAMAX) 100 mg tablet Take 1 tablet (100 mg total) by mouth daily at bedtime 30 tablet 3    venlafaxine (EFFEXOR) 25 mg tablet 2 tabs in am, 1 in pm 90 tablet 3    divalproex sodium (DEPAKOTE ER) 250 mg 24 hr tablet TAKE 2 TABLETS (500 MG TOTAL) BY MOUTH DAILY AT BEDTIME (Patient not taking: Reported on 1/3/2019 ) 60 tablet 2     No current facility-administered medications for this visit  She is allergic to doxycycline; erythromycin; other; and penicillins            Objective:    Blood pressure 128/81, pulse 58, weight 71 4 kg (157 lb 6 4 oz)  Physical Exam    Neurological Exam  Vital signs reviewed  Well developed, well nourished  Mood and affect pleasant  Head: Normocephalic, atraumatic  CN 4-31: intact and symmetric, including EOMs which are normal b/l and PERRL; no nystagmus  MSK: 5/5 t/o  ROM normal x all 4 extr  Sensation: Inact to LT x4 extr  Reflexes: 2+ and symmetric in all 4 extr  Coordination: Nml x4 extr  Gait: Steady normal gait  ROS:    Review of Systems   Constitutional: Positive for unexpected weight change  HENT: Negative  Eyes: Negative  Respiratory: Negative  Cardiovascular: Negative  Gastrointestinal: Negative  Endocrine: Negative  Genitourinary: Negative  Musculoskeletal: Positive for neck pain  Skin: Negative  Allergic/Immunologic: Negative  Neurological: Positive for dizziness, numbness (fingers) and headaches  Memory problems     Hematological: Negative  Psychiatric/Behavioral: The patient is nervous/anxious        The following portions of the patient's history were reviewed and updated as appropriate: allergies, current medications/ medication history, past family history, past medical history, past social history, past surgical history and problem list     Review of systems was reviewed and otherwise unremarkable from a neurological perspective

## 2019-03-01 ENCOUNTER — OFFICE VISIT (OUTPATIENT)
Dept: FAMILY MEDICINE CLINIC | Facility: CLINIC | Age: 54
End: 2019-03-01
Payer: COMMERCIAL

## 2019-03-01 VITALS
HEART RATE: 68 BPM | TEMPERATURE: 97.9 F | DIASTOLIC BLOOD PRESSURE: 88 MMHG | HEIGHT: 65 IN | OXYGEN SATURATION: 98 % | SYSTOLIC BLOOD PRESSURE: 130 MMHG | RESPIRATION RATE: 16 BRPM | WEIGHT: 163.2 LBS | BODY MASS INDEX: 27.19 KG/M2

## 2019-03-01 DIAGNOSIS — Z00.00 ROUTINE ADULT HEALTH MAINTENANCE: Primary | ICD-10-CM

## 2019-03-01 DIAGNOSIS — R63.5 WEIGHT GAIN: ICD-10-CM

## 2019-03-01 DIAGNOSIS — F41.9 ANXIETY AND DEPRESSION: ICD-10-CM

## 2019-03-01 DIAGNOSIS — R09.82 PND (POST-NASAL DRIP): ICD-10-CM

## 2019-03-01 DIAGNOSIS — E66.3 OVERWEIGHT (BMI 25.0-29.9): ICD-10-CM

## 2019-03-01 DIAGNOSIS — F32.A ANXIETY AND DEPRESSION: ICD-10-CM

## 2019-03-01 DIAGNOSIS — I83.813 VARICOSE VEINS OF BOTH LOWER EXTREMITIES WITH PAIN: ICD-10-CM

## 2019-03-01 DIAGNOSIS — Z12.39 SCREENING FOR BREAST CANCER: ICD-10-CM

## 2019-03-01 DIAGNOSIS — G43.709 CHRONIC MIGRAINE WITHOUT AURA WITHOUT STATUS MIGRAINOSUS, NOT INTRACTABLE: ICD-10-CM

## 2019-03-01 DIAGNOSIS — Z11.59 ENCOUNTER FOR HEPATITIS C SCREENING TEST FOR LOW RISK PATIENT: ICD-10-CM

## 2019-03-01 DIAGNOSIS — Z12.11 COLON CANCER SCREENING: ICD-10-CM

## 2019-03-01 DIAGNOSIS — E78.00 HYPERCHOLESTEROLEMIA: ICD-10-CM

## 2019-03-01 PROBLEM — M25.421 EFFUSION OF RIGHT ELBOW: Status: RESOLVED | Noted: 2018-06-14 | Resolved: 2019-03-01

## 2019-03-01 PROBLEM — J45.909 ASTHMA: Status: ACTIVE | Noted: 2019-03-01

## 2019-03-01 PROBLEM — G44.219 EPISODIC TENSION-TYPE HEADACHE, NOT INTRACTABLE: Status: ACTIVE | Noted: 2017-09-12

## 2019-03-01 PROBLEM — M25.521 PAIN IN RIGHT ELBOW: Status: RESOLVED | Noted: 2018-06-14 | Resolved: 2019-03-01

## 2019-03-01 PROBLEM — G44.219 EPISODIC TENSION-TYPE HEADACHE, NOT INTRACTABLE: Status: RESOLVED | Noted: 2017-09-12 | Resolved: 2019-03-01

## 2019-03-01 PROBLEM — R42 VERTIGO: Status: RESOLVED | Noted: 2018-02-06 | Resolved: 2019-03-01

## 2019-03-01 PROBLEM — J45.909 ASTHMA: Status: RESOLVED | Noted: 2019-03-01 | Resolved: 2019-03-01

## 2019-03-01 PROBLEM — G43.109 VERTIGINOUS MIGRAINE: Status: RESOLVED | Noted: 2018-02-06 | Resolved: 2019-03-01

## 2019-03-01 PROBLEM — Z77.29 CARBON MONOXIDE EXPOSURE: Status: RESOLVED | Noted: 2018-02-06 | Resolved: 2019-03-01

## 2019-03-01 PROCEDURE — 1036F TOBACCO NON-USER: CPT | Performed by: FAMILY MEDICINE

## 2019-03-01 PROCEDURE — 99214 OFFICE O/P EST MOD 30 MIN: CPT | Performed by: FAMILY MEDICINE

## 2019-03-01 PROCEDURE — 3008F BODY MASS INDEX DOCD: CPT | Performed by: FAMILY MEDICINE

## 2019-03-01 PROCEDURE — 99396 PREV VISIT EST AGE 40-64: CPT | Performed by: FAMILY MEDICINE

## 2019-03-01 RX ORDER — VENLAFAXINE 50 MG/1
50 TABLET ORAL 2 TIMES DAILY
Qty: 60 TABLET | Refills: 1 | Status: SHIPPED | OUTPATIENT
Start: 2019-03-01 | End: 2019-03-28 | Stop reason: DRUGHIGH

## 2019-03-01 RX ORDER — FLUTICASONE PROPIONATE 50 MCG
1 SPRAY, SUSPENSION (ML) NASAL DAILY
Qty: 1 BOTTLE | Refills: 0 | Status: SHIPPED | OUTPATIENT
Start: 2019-03-01 | End: 2019-03-28 | Stop reason: SDUPTHER

## 2019-03-01 NOTE — PROGRESS NOTES
FAMILY MEDICINE PROGRESS NOTE  Vijaya Nuñez 48 y o  female   DATE: March 1, 2019     ASSESSMENT and PLAN:  Vijaya Nuñez is a 48 y o  female with:     Hypercholesterolemia  Last Lipid Panel:  Lab Results   Component Value Date    CHOLESTEROL 241 (H) 09/16/2017    HDL 92 (H) 09/16/2017    TRIG 76 09/16/2017     The 10-year ASCVD risk score (Mely Bennett et al , 2013) is: 1 2%    Values used to calculate the score:      Age: 48 years      Sex: Female      Is Non- : No      Diabetic: No      Tobacco smoker: No      Systolic Blood Pressure: 887 mmHg      Is BP treated: No      HDL Cholesterol: 92 mg/dL      Total Cholesterol: 241 mg/dL    Reviewed healthy, low cholesterol diet, given handout  Recheck FLP    Chronic migraine without aura without status migrainosus, not intractable  Chronic daily headaches that she follows with Neurology for  Currently on Gabapentin 100mg qam, 100mg qnoon, 200mg qHS with Toradol and Compazine PRN    Overweight (BMI 25 0-29  9)  Wt Readings from Last 3 Encounters:   03/01/19 74 kg (163 lb 3 2 oz)   01/15/19 71 4 kg (157 lb 6 4 oz)   01/03/19 69 5 kg (153 lb 4 oz)     BMI Counseling: Body mass index is 27 16 kg/m²  Discussed the patient's BMI with her  The BMI is above average  BMI counseling and education was provided to the patient  Nutrition recommendations include reducing portion sizes, decreasing overall calorie intake, 3-5 servings of fruits/vegetables daily and reducing intake of cholesterol  Exercise recommendations include strength training exercises        Anxiety and depression  Currently on Effexor 50mg qam and 25mg qpm for migraines but experience more anhedonia, will titrate up dose  Increase to 50mg BID    Varicose veins of both lower extremities with pain  Has had multiple issues with her varicosities and has had multiple procedures, but still has recurrence and pain pain    PND (post-nasal drip)  No evidence of sinusitis, add daily Flonase to also help with b/l ETD      SUBJECTIVE:  Aldo Muñoz is a 48 y o  female who presents today with a chief complaint of Follow-up  Aldo Muñoz is here for a 6 month follow-up and to establish with me as a new PCP  The active chronic medical problems and medications are as below:   1  Migraine HA- f/w Neurology, no longer taking Topamax due to stomach pains  2  Anxiety/Depression- feeling more anhedonia of late, is on effexor for migraines  3  Weight gain- lost weight with shakes/diets, but has been gaining it back  4  HTN- was temporary due to stress, but not on any meds  5  HLD- never been on meds, eats a relatively healthy diet    Acute "sinus" problems x 1 days    Review of Systems   Constitutional: Positive for fatigue and unexpected weight change (weight gain)  Negative for chills and fever  HENT: Positive for congestion, postnasal drip and rhinorrhea  Negative for ear pain, sinus pressure, sinus pain, sneezing and sore throat  Respiratory: Negative for cough and shortness of breath  Cardiovascular: Negative for chest pain  Gastrointestinal: Negative for diarrhea, nausea and vomiting  Psychiatric/Behavioral: Positive for dysphoric mood  The patient is nervous/anxious  I have reviewed the patient's PMH, Social History, Medication List and Allergies as appropriate  OBJECTIVE:  /88   Pulse 68   Temp 97 9 °F (36 6 °C)   Resp 16   Ht 5' 5" (1 651 m)   Wt 74 kg (163 lb 3 2 oz)   SpO2 98%   BMI 27 16 kg/m²    Physical Exam   Constitutional: She appears well-developed and well-nourished  No distress  HENT:   Head: Normocephalic and atraumatic  Right Ear: External ear normal  Tympanic membrane is not erythematous and not bulging  A middle ear effusion is present  Left Ear: External ear normal  Tympanic membrane is not erythematous and not bulging  A middle ear effusion is present     Nose: Right sinus exhibits no maxillary sinus tenderness and no frontal sinus tenderness  Left sinus exhibits no maxillary sinus tenderness and no frontal sinus tenderness  Mouth/Throat: Uvula is midline, oropharynx is clear and moist and mucous membranes are normal  No oropharyngeal exudate, posterior oropharyngeal edema, posterior oropharyngeal erythema or tonsillar abscesses  Eyes: Conjunctivae are normal  Right eye exhibits no discharge  Left eye exhibits no discharge  Neck: Normal range of motion  Neck supple  Cardiovascular: Normal rate and normal heart sounds  Pulmonary/Chest: Effort normal and breath sounds normal  No respiratory distress  She has no wheezes  She has no rales  Lymphadenopathy:     She has no cervical adenopathy  Skin: She is not diaphoretic  Vitals reviewed  Stacia Delcid MD    Note: Portions of the record may have been created with voice recognition software  Occasional wrong word or "sound a like" substitutions may have occurred due to the inherent limitations of voice recognition software  Read the chart carefully and recognize, using context, where substitutions have occurred

## 2019-03-01 NOTE — ASSESSMENT & PLAN NOTE
Currently on Effexor 50mg qam and 25mg qpm for migraines but experience more anhedonia, will titrate up dose  Increase to 50mg BID

## 2019-03-01 NOTE — ASSESSMENT & PLAN NOTE
Chronic daily headaches that she follows with Neurology for  Currently on Gabapentin 100mg qam, 100mg qnoon, 200mg qHS with Toradol and Compazine PRN

## 2019-03-01 NOTE — ASSESSMENT & PLAN NOTE
Has had multiple issues with her varicosities and has had multiple procedures, but still has recurrence and pain pain

## 2019-03-01 NOTE — PROGRESS NOTES
[address] 48 y o  female   DATE: March 1, 2019     Assessment and Plan:  48 y o  female exam      1  Health Maintenance  - Colonoscopy? Referral placed, had a colonoscopy <10 years ago per pt, thinks she was told [id number], no records available  - Pap? Last was in 2009, will be reestablishing with Gyn  - Mammo? Ordered today, none in the last 10 years  - Labs: FLP, BMP, Hep C  - Immunizations: Reviewed  Flu UTD, Td <10 years ago  Health Maintenance   Topic Date Due    Hepatitis C Screening  1965    MAMMOGRAM  1965    CRC Screening: Colonoscopy  1965    BMI: Followup Plan  12/19/1983    DTaP,Tdap,and Td Vaccines (1 - Tdap) 12/19/1986    PAP SMEAR  12/19/1986    Depression Screening PHQ  07/26/2019    BMI: Adult  03/01/2020    INFLUENZA VACCINE  Completed    HEPATITIS B VACCINES  Aged Out       2  Discussed the patient's BMI with her, (Body mass index is 27 16 kg/m²  )  Advised a healthy, balanced diet and regular exercise for 30 minutes 4-5 times a week  3  Patient Counseling: Patient given handout  --Nutrition: Stressed importance of moderation in sodium/caffeine intake, saturated fat and cholesterol, caloric balance, sufficient intake of fresh fruits, vegetables, fiber, calcium, iron, and 1 mg of folate supplement per day (for females capable of pregnancy)  --Exercise: Stressed the importance of regular exercise  --Substance Abuse: Discussed cessation/primary prevention of tobacco, alcohol, or other drug use; driving or other dangerous activities under the influence; availability of treatment for abuse  --Dental health: Discussed importance of regular tooth brushing, flossing, and dental visits  Follow up next physical in 1 year  Subjective:    Yoselin Corado is a 48 y o  female and is here for a comprehensive physical exam    Eats healthy diet, exercises regularly, hasnt been to her regular doctor for years      Histories Updated and Reviewed 3/1/2019:  Patient's Medications   New Prescriptions    FLUTICASONE (FLONASE) 50 MCG/ACT NASAL SPRAY    1 spray into each nostril daily for 30 days   Previous Medications    CETIRIZINE (ZYRTEC) 10 MG TABLET    Take 10 mg by mouth    GABAPENTIN (NEURONTIN) 100 MG CAPSULE    1 CAPSULE IN THE MORNING, 1 CAPSULE AT NOON AND 2 CAPSULES AT NIGHT TIME  KETOROLAC (TORADOL) 10 MG TABLET    TAKE 1 TABLET BY MOUTH 3 TIMES A DAY AT ONSET OF HEADACHE  MAX 3 TIMES A WEEK    MULTIPLE VITAMINS ESSENTIAL PO    Take by mouth    PROCHLORPERAZINE (COMPAZINE) 5 MG TABLET    TAKE 1 TABLET BY MOUTH 3 TIMES A DAY AS NEEDED FOR HEADACHE AND FOR NAUSEA   Modified Medications    Modified Medication Previous Medication    VENLAFAXINE (EFFEXOR) 50 MG TABLET venlafaxine (EFFEXOR) 25 mg tablet       Take 1 tablet (50 mg total) by mouth 2 (two) times a day    2 tabs in am, 1 in pm   Discontinued Medications    DIVALPROEX SODIUM (DEPAKOTE ER) 250 MG 24 HR TABLET    TAKE 2 TABLETS (500 MG TOTAL) BY MOUTH DAILY AT BEDTIME    TOPIRAMATE (TOPAMAX) 100 MG TABLET    Take 1 tablet (100 mg total) by mouth daily at bedtime     Allergies   Allergen Reactions    Penicillins     Doxycycline     Erythromycin     Other      Mushrooms     Past Medical History:   Diagnosis Date    Asthma     Carbon monoxide exposure 2/6/2018    Chronic back pain     Lump of skin     last assessed 11/21/13    Migraine     Sinus bradycardia     last assessed 10/25/16     Social History     Socioeconomic History    Marital status: /Civil Union     Spouse name: Not on file    Number of children: Not on file    Years of education: Not on file    Highest education level: Not on file   Occupational History    Not on file   Social Needs    Financial resource strain: Not on file    Food insecurity:     Worry: Not on file     Inability: Not on file    Transportation needs:     Medical: Not on file     Non-medical: Not on file   Tobacco Use    Smoking status: Former Smoker    Smokeless tobacco: Never Used   Substance and Sexual Activity    Alcohol use: No     Comment: social per [employer]    Drug use: No    Sexual activity: Not on file   Lifestyle    Physical activity:     Days per week: Not on file     Minutes per session: Not on file    Stress: Not on file   Relationships    Social connections:     Talks on phone: Not on file     Gets together: Not on file     Attends Spiritism service: Not on file     Active member of club or organization: Not on file     Attends meetings of clubs or organizations: Not on file     Relationship status: Not on file    Intimate partner violence:     Fear of current or ex partner: Not on file     Emotionally abused: Not on file     Physically abused: Not on file     Forced sexual activity: Not on file   Other Topics Concern    Not on file   Social History Narrative    Caffeine use    Exercises 6 times a week     Immunization History   Administered Date(s) Administered    INFLUENZA 02/04/2019       Review of Systems:  Review of Systems   Constitutional: Negative for chills and fever  HENT: Negative for ear pain  Eyes: Negative for visual disturbance  Respiratory: Negative for cough and shortness of breath  Cardiovascular: Negative for chest pain and palpitations  Gastrointestinal: Negative for abdominal pain, diarrhea, nausea and vomiting  Musculoskeletal: Negative for arthralgias  Skin: Negative for rash  Neurological: Negative for headaches  Hematological: Does not bruise/bleed easily       PHQ-9 Depression Screening    PHQ-9:    Frequency of the following problems over the past two weeks:       Little interest or pleasure in doing things:  0 - not at all  Feeling down, depressed, or hopeless:  0 - not at all  PHQ-2 Score:  0         Objective:  /88   Pulse 68   Temp 97 9 °F (36 6 °C)   Resp 16   Ht 5' 5" (1 651 m)   Wt 74 kg (163 lb 3 2 oz)   SpO2 98%   BMI 27 16 kg/m²   Physical Exam Constitutional: She is oriented to person, place, and time  She appears well-developed and well-nourished  No distress  HENT:   Head: Normocephalic and atraumatic  Mouth/Throat: Oropharynx is clear and moist  No oropharyngeal exudate  Eyes: Pupils are equal, round, and reactive to light  EOM are normal  Right eye exhibits no discharge  Left eye exhibits no discharge  Neck: Normal range of motion  Neck supple  No JVD present  Cardiovascular: Normal rate, regular rhythm and normal heart sounds  No murmur heard  Pulmonary/Chest: Effort normal and breath sounds normal  No stridor  No respiratory distress  She has no wheezes  Abdominal: Soft  Bowel sounds are normal  There is no tenderness  There is no rebound and no guarding  Musculoskeletal: Normal range of motion  She exhibits no edema or tenderness  Neurological: She is alert and oriented to person, place, and time  Skin: Skin is warm and dry  She is not diaphoretic  No erythema  Psychiatric: She has a normal mood and affect  Her behavior is normal    Vitals reviewed  Patient Care Team:  Lia Silveira MD as PCP - General (Family Medicine)  MD Celeste Wallace DO Elijio Haws, MD Mardeen Railing Nicklas Parsons, MD    Note: Portions of the record may have been created with voice recognition software  Occasional wrong word or "sound a like" substitutions may have occurred due to the inherent limitations of voice recognition software  Read the chart carefully and recognize, using context, where substitutions have occurred

## 2019-03-01 NOTE — ASSESSMENT & PLAN NOTE
Wt Readings from Last 3 Encounters:   03/01/19 74 kg (163 lb 3 2 oz)   01/15/19 71 4 kg (157 lb 6 4 oz)   01/03/19 69 5 kg (153 lb 4 oz)     BMI Counseling: Body mass index is 27 16 kg/m²  Discussed the patient's BMI with her  The BMI is above average  BMI counseling and education was provided to the patient  Nutrition recommendations include reducing portion sizes, decreasing overall calorie intake, 3-5 servings of fruits/vegetables daily and reducing intake of cholesterol  Exercise recommendations include strength training exercises

## 2019-03-01 NOTE — ASSESSMENT & PLAN NOTE
Last Lipid Panel:  Lab Results   Component Value Date    CHOLESTEROL 241 (H) 09/16/2017    HDL 92 (H) 09/16/2017    TRIG 76 09/16/2017     The 10-year ASCVD risk score (Amanda Nunn et al , 2013) is: 1 2%    Values used to calculate the score:      Age: 48 years      Sex: Female      Is Non- : No      Diabetic: No      Tobacco smoker: No      Systolic Blood Pressure: 476 mmHg      Is BP treated: No      HDL Cholesterol: 92 mg/dL      Total Cholesterol: 241 mg/dL    Reviewed healthy, low cholesterol diet, given handout  Recheck FLP

## 2019-03-02 DIAGNOSIS — F41.9 ANXIETY: ICD-10-CM

## 2019-03-02 RX ORDER — VENLAFAXINE 25 MG/1
TABLET ORAL
Qty: 90 TABLET | Refills: 3 | OUTPATIENT
Start: 2019-03-02

## 2019-03-04 ENCOUNTER — APPOINTMENT (OUTPATIENT)
Dept: LAB | Facility: MEDICAL CENTER | Age: 54
End: 2019-03-04
Payer: COMMERCIAL

## 2019-03-04 DIAGNOSIS — Z11.59 ENCOUNTER FOR HEPATITIS C SCREENING TEST FOR LOW RISK PATIENT: ICD-10-CM

## 2019-03-04 DIAGNOSIS — E78.00 HYPERCHOLESTEROLEMIA: ICD-10-CM

## 2019-03-04 LAB
ALBUMIN SERPL BCP-MCNC: 4.1 G/DL (ref 3.5–5)
ALP SERPL-CCNC: 72 U/L (ref 46–116)
ALT SERPL W P-5'-P-CCNC: 30 U/L (ref 12–78)
ANION GAP SERPL CALCULATED.3IONS-SCNC: 4 MMOL/L (ref 4–13)
AST SERPL W P-5'-P-CCNC: 24 U/L (ref 5–45)
BILIRUB SERPL-MCNC: 0.3 MG/DL (ref 0.2–1)
BUN SERPL-MCNC: 16 MG/DL (ref 5–25)
CALCIUM SERPL-MCNC: 9.1 MG/DL (ref 8.3–10.1)
CHLORIDE SERPL-SCNC: 106 MMOL/L (ref 100–108)
CHOLEST SERPL-MCNC: 258 MG/DL (ref 50–200)
CO2 SERPL-SCNC: 30 MMOL/L (ref 21–32)
CREAT SERPL-MCNC: 0.9 MG/DL (ref 0.6–1.3)
GFR SERPL CREATININE-BSD FRML MDRD: 73 ML/MIN/1.73SQ M
GLUCOSE P FAST SERPL-MCNC: 95 MG/DL (ref 65–99)
HCV AB SER QL: NORMAL
HDLC SERPL-MCNC: 109 MG/DL (ref 40–60)
LDLC SERPL CALC-MCNC: 131 MG/DL (ref 0–100)
POTASSIUM SERPL-SCNC: 4.1 MMOL/L (ref 3.5–5.3)
PROT SERPL-MCNC: 7.3 G/DL (ref 6.4–8.2)
SODIUM SERPL-SCNC: 140 MMOL/L (ref 136–145)
TRIGL SERPL-MCNC: 88 MG/DL
TSH SERPL DL<=0.05 MIU/L-ACNC: 1.96 UIU/ML (ref 0.36–3.74)

## 2019-03-04 PROCEDURE — 84443 ASSAY THYROID STIM HORMONE: CPT | Performed by: FAMILY MEDICINE

## 2019-03-04 PROCEDURE — 80053 COMPREHEN METABOLIC PANEL: CPT | Performed by: FAMILY MEDICINE

## 2019-03-04 PROCEDURE — 86803 HEPATITIS C AB TEST: CPT

## 2019-03-04 PROCEDURE — 80061 LIPID PANEL: CPT

## 2019-03-04 PROCEDURE — 36415 COLL VENOUS BLD VENIPUNCTURE: CPT | Performed by: FAMILY MEDICINE

## 2019-03-26 ENCOUNTER — OFFICE VISIT (OUTPATIENT)
Dept: OBGYN CLINIC | Facility: MEDICAL CENTER | Age: 54
End: 2019-03-26
Payer: COMMERCIAL

## 2019-03-26 ENCOUNTER — TELEPHONE (OUTPATIENT)
Dept: NEUROLOGY | Facility: CLINIC | Age: 54
End: 2019-03-26

## 2019-03-26 VITALS
HEIGHT: 66 IN | DIASTOLIC BLOOD PRESSURE: 88 MMHG | RESPIRATION RATE: 14 BRPM | WEIGHT: 164.4 LBS | BODY MASS INDEX: 26.42 KG/M2 | SYSTOLIC BLOOD PRESSURE: 130 MMHG

## 2019-03-26 DIAGNOSIS — Z01.419 ENCOUNTER FOR ANNUAL ROUTINE GYNECOLOGICAL EXAMINATION: Primary | ICD-10-CM

## 2019-03-26 DIAGNOSIS — Z12.31 ENCOUNTER FOR SCREENING MAMMOGRAM FOR MALIGNANT NEOPLASM OF BREAST: ICD-10-CM

## 2019-03-26 DIAGNOSIS — Z00.00 HEALTH CARE MAINTENANCE: ICD-10-CM

## 2019-03-26 PROCEDURE — S0610 ANNUAL GYNECOLOGICAL EXAMINA: HCPCS | Performed by: PHYSICIAN ASSISTANT

## 2019-03-26 PROCEDURE — G0145 SCR C/V CYTO,THINLAYER,RESCR: HCPCS | Performed by: PHYSICIAN ASSISTANT

## 2019-03-26 PROCEDURE — 87624 HPV HI-RISK TYP POOLED RSLT: CPT | Performed by: PHYSICIAN ASSISTANT

## 2019-03-26 NOTE — ASSESSMENT & PLAN NOTE
Annual exam and pap performed, will call if abnormal or send letter if normal  mammo rx given  Ref to GI for colonoscopy  rec calcium and vit d daily  RTO 1 year

## 2019-03-26 NOTE — PROGRESS NOTES
Assessment/Plan  Problem List Items Addressed This Visit        Other    Encounter for annual routine gynecological examination - Primary     Annual exam and pap performed, will call if abnormal or send letter if normal  mammo rx given  Ref to GI for colonoscopy  rec calcium and vit d daily  RTO 1 year         Relevant Orders    Liquid-based pap, screening      Other Visit Diagnoses     Encounter for screening mammogram for malignant neoplasm of breast        Relevant Orders    Mammo screening bilateral w cad    Health care maintenance        Relevant Orders    Ambulatory referral to Gastroenterology        Joao Mcintyre is a 48 y o  female who presents for new patient annual GYN exam  She reports no menses, and she denies postmenopausal bleeding, spotting, or discharge  Had ablation for menorrhagia with irregular cycle 10/2008  No menses since  Had worse hot flashes a few years ago but now improved  She reports that she is sexually active with 1 partner  She denies any pain or dryness with intercourse  Last Pap smear:   Regular self breast exam: yes  Last mammogram:   Last Colonoscopy:   Family history of uterine or ovarian cancer: no  Family history of breast cancer: no  Family history of colon cancer: yes - father dx 62s    Menstrual History:  OB History        4    Para   3    Term   3       0    AB   1    Living   3       SAB   0    TAB   1    Ectopic   0    Multiple   0    Live Births   3                Menarche age: 15  No LMP recorded  Patient has had an ablation    Period Pattern: (!) Irregular  Menstrual Flow: Heavy    Past Medical History:   Diagnosis Date    Abnormal Pap smear of cervix     all normal since    Asthma     Carbon monoxide exposure 2018    Chronic back pain     Lump of skin     last assessed 13    Migraine     Sinus bradycardia     last assessed 10/25/16    Varicella      Past Surgical History:   Procedure Laterality Date  APPENDECTOMY      BREAST LUMPECTOMY      right breast was benign    TONSILLECTOMY      WISDOM TOOTH EXTRACTION Bilateral      Family History   Problem Relation Age of Onset    Heart failure Mother         CHF    Heart attack Father     Hypertension Brother     Diabetes Maternal Aunt     Stroke Maternal Aunt     Arthritis Family        Review of Systems  Review of Systems   Constitutional: Negative for chills and fever  Respiratory: Negative for shortness of breath  Cardiovascular: Negative for chest pain  Gastrointestinal: Negative for abdominal pain  Genitourinary: Negative for dysuria, pelvic pain, vaginal bleeding, vaginal discharge and vaginal pain  Negative for breast pain or lumps  Negative for stress urinary incontinence  Neurological: Negative for headaches  Objective   /88 (BP Location: Left arm, Patient Position: Sitting, Cuff Size: Standard)   Resp 14   Ht 5' 6" (1 676 m)   Wt 74 6 kg (164 lb 6 4 oz)   BMI 26 53 kg/m²     Physical Exam   Constitutional: She is oriented to person, place, and time  She appears well-developed and well-nourished  Neck: No thyromegaly present  Cardiovascular: Normal rate and regular rhythm  Pulmonary/Chest: Effort normal and breath sounds normal  Right breast exhibits no mass, no nipple discharge, no skin change and no tenderness  Left breast exhibits no mass, no nipple discharge, no skin change and no tenderness  Abdominal: Soft  There is no tenderness  There is no rebound and no guarding  Genitourinary: There is no rash or lesion on the right labia  There is no rash or lesion on the left labia  Uterus is not enlarged and not tender  Cervix exhibits no motion tenderness and no discharge  Right adnexum displays no mass and no tenderness  Left adnexum displays no mass and no tenderness  No bleeding in the vagina  No vaginal discharge found  Neurological: She is alert and oriented to person, place, and time  Psychiatric: She has a normal mood and affect  Nursing note and vitals reviewed

## 2019-03-27 LAB
HPV HR 12 DNA CVX QL NAA+PROBE: NEGATIVE
HPV16 DNA CVX QL NAA+PROBE: NEGATIVE
HPV18 DNA CVX QL NAA+PROBE: NEGATIVE

## 2019-03-28 ENCOUNTER — TELEPHONE (OUTPATIENT)
Dept: NEUROLOGY | Facility: CLINIC | Age: 54
End: 2019-03-28

## 2019-03-28 ENCOUNTER — OFFICE VISIT (OUTPATIENT)
Dept: NEUROLOGY | Facility: CLINIC | Age: 54
End: 2019-03-28
Payer: COMMERCIAL

## 2019-03-28 VITALS
HEART RATE: 70 BPM | WEIGHT: 164 LBS | BODY MASS INDEX: 26.36 KG/M2 | HEIGHT: 66 IN | DIASTOLIC BLOOD PRESSURE: 76 MMHG | SYSTOLIC BLOOD PRESSURE: 136 MMHG

## 2019-03-28 DIAGNOSIS — G43.709 CHRONIC MIGRAINE WITHOUT AURA WITHOUT STATUS MIGRAINOSUS, NOT INTRACTABLE: Primary | ICD-10-CM

## 2019-03-28 DIAGNOSIS — G43.109 VERTIGINOUS MIGRAINE: ICD-10-CM

## 2019-03-28 DIAGNOSIS — R09.82 PND (POST-NASAL DRIP): ICD-10-CM

## 2019-03-28 LAB
LAB AP GYN PRIMARY INTERPRETATION: NORMAL
Lab: NORMAL

## 2019-03-28 PROCEDURE — 99215 OFFICE O/P EST HI 40 MIN: CPT | Performed by: PHYSICIAN ASSISTANT

## 2019-03-28 RX ORDER — VENLAFAXINE HYDROCHLORIDE 150 MG/1
150 CAPSULE, EXTENDED RELEASE ORAL DAILY
Qty: 90 CAPSULE | Refills: 1 | Status: SHIPPED | OUTPATIENT
Start: 2019-03-28 | End: 2019-08-31 | Stop reason: SDUPTHER

## 2019-03-28 RX ORDER — GABAPENTIN 100 MG/1
CAPSULE ORAL
Qty: 120 CAPSULE | Refills: 2 | Status: SHIPPED | OUTPATIENT
Start: 2019-03-28 | End: 2019-11-19 | Stop reason: SDUPTHER

## 2019-03-28 RX ORDER — MECLIZINE HCL 12.5 MG/1
TABLET ORAL
Qty: 30 TABLET | Refills: 0 | Status: SHIPPED | OUTPATIENT
Start: 2019-03-28

## 2019-03-28 RX ORDER — ACETAMINOPHEN, ASPIRIN AND CAFFEINE 250; 250; 65 MG/1; MG/1; MG/1
2 TABLET, FILM COATED ORAL EVERY 6 HOURS PRN
COMMUNITY
End: 2019-07-18 | Stop reason: ALTCHOICE

## 2019-03-28 RX ORDER — FLUTICASONE PROPIONATE 50 MCG
SPRAY, SUSPENSION (ML) NASAL
Qty: 1 BOTTLE | Refills: 0 | Status: SHIPPED | OUTPATIENT
Start: 2019-03-28 | End: 2019-04-24 | Stop reason: SDUPTHER

## 2019-03-28 NOTE — TELEPHONE ENCOUNTER
Pt called in to state PA is needed for Aimovig  I did verify with [facility] that PA is needed  Pt was seen in office today  Will submit PA when note from today is completed      ID [id number]  [id number]  [address] [id number]    [phone]

## 2019-03-29 ENCOUNTER — PATIENT MESSAGE (OUTPATIENT)
Dept: NEUROLOGY | Facility: CLINIC | Age: 54
End: 2019-03-29

## 2019-03-30 DIAGNOSIS — G43.709 CHRONIC MIGRAINE WITHOUT AURA WITHOUT STATUS MIGRAINOSUS, NOT INTRACTABLE: ICD-10-CM

## 2019-04-01 NOTE — TELEPHONE ENCOUNTER
PA denied  Her insurance is stating they want her to have at least a 3 month trial and failure of: amerge, axert, frova, maxalt, relpax, imitrex or zomig  I did call pt and made her aware  I also made her aware of co-pay card info, how to obtain it and that she should then be able to obtain med free of charge  She will do this and call us back if she has any issue getting med from pharmacy

## 2019-04-18 ENCOUNTER — TELEPHONE (OUTPATIENT)
Dept: GASTROENTEROLOGY | Facility: CLINIC | Age: 54
End: 2019-04-18

## 2019-04-18 PROBLEM — Z12.11 SCREENING FOR COLON CANCER: Status: ACTIVE | Noted: 2019-04-18

## 2019-04-22 PROBLEM — G43.109 VERTIGINOUS MIGRAINE: Status: ACTIVE | Noted: 2019-04-22

## 2019-04-23 ENCOUNTER — TELEPHONE (OUTPATIENT)
Dept: NEUROLOGY | Facility: CLINIC | Age: 54
End: 2019-04-23

## 2019-04-24 DIAGNOSIS — R09.82 PND (POST-NASAL DRIP): ICD-10-CM

## 2019-04-24 RX ORDER — FLUTICASONE PROPIONATE 50 MCG
SPRAY, SUSPENSION (ML) NASAL
Qty: 1 BOTTLE | Refills: 0 | Status: SHIPPED | OUTPATIENT
Start: 2019-04-24 | End: 2019-05-30 | Stop reason: SDUPTHER

## 2019-05-02 ENCOUNTER — TELEPHONE (OUTPATIENT)
Dept: NEUROLOGY | Facility: CLINIC | Age: 54
End: 2019-05-02

## 2019-05-07 ENCOUNTER — OFFICE VISIT (OUTPATIENT)
Dept: NEUROLOGY | Facility: CLINIC | Age: 54
End: 2019-05-07
Payer: COMMERCIAL

## 2019-05-07 VITALS
BODY MASS INDEX: 25.82 KG/M2 | HEART RATE: 69 BPM | DIASTOLIC BLOOD PRESSURE: 78 MMHG | SYSTOLIC BLOOD PRESSURE: 122 MMHG | WEIGHT: 160 LBS

## 2019-05-07 DIAGNOSIS — G43.109 VERTIGINOUS MIGRAINE: ICD-10-CM

## 2019-05-07 DIAGNOSIS — G43.709 CHRONIC MIGRAINE WITHOUT AURA WITHOUT STATUS MIGRAINOSUS, NOT INTRACTABLE: Primary | ICD-10-CM

## 2019-05-07 PROCEDURE — 99214 OFFICE O/P EST MOD 30 MIN: CPT | Performed by: PHYSICIAN ASSISTANT

## 2019-05-07 RX ORDER — SUMATRIPTAN 25 MG/1
TABLET, FILM COATED ORAL
Qty: 9 TABLET | Refills: 0 | Status: SHIPPED | OUTPATIENT
Start: 2019-05-07 | End: 2020-02-14

## 2019-05-09 ENCOUNTER — TELEPHONE (OUTPATIENT)
Dept: NEUROLOGY | Facility: CLINIC | Age: 54
End: 2019-05-09

## 2019-05-09 DIAGNOSIS — G43.709 CHRONIC MIGRAINE WITHOUT AURA WITHOUT STATUS MIGRAINOSUS, NOT INTRACTABLE: ICD-10-CM

## 2019-05-15 ENCOUNTER — ANESTHESIA EVENT (OUTPATIENT)
Dept: GASTROENTEROLOGY | Facility: AMBULARY SURGERY CENTER | Age: 54
End: 2019-05-15

## 2019-05-26 DIAGNOSIS — R09.82 PND (POST-NASAL DRIP): ICD-10-CM

## 2019-05-27 RX ORDER — FLUTICASONE PROPIONATE 50 MCG
SPRAY, SUSPENSION (ML) NASAL
Refills: 0 | OUTPATIENT
Start: 2019-05-27

## 2019-05-28 ENCOUNTER — HOSPITAL ENCOUNTER (OUTPATIENT)
Dept: GASTROENTEROLOGY | Facility: AMBULARY SURGERY CENTER | Age: 54
Setting detail: OUTPATIENT SURGERY
Discharge: HOME | End: 2019-05-28
Attending: INTERNAL MEDICINE
Payer: COMMERCIAL

## 2019-05-28 ENCOUNTER — ANESTHESIA (OUTPATIENT)
Dept: GASTROENTEROLOGY | Facility: AMBULARY SURGERY CENTER | Age: 54
End: 2019-05-28

## 2019-05-28 VITALS
BODY MASS INDEX: 26.2 KG/M2 | HEIGHT: 66 IN | TEMPERATURE: 97.3 F | WEIGHT: 163 LBS | DIASTOLIC BLOOD PRESSURE: 72 MMHG | RESPIRATION RATE: 20 BRPM | OXYGEN SATURATION: 100 % | HEART RATE: 52 BPM | SYSTOLIC BLOOD PRESSURE: 123 MMHG

## 2019-05-28 DIAGNOSIS — Z12.11 ENCOUNTER FOR SCREENING FOR MALIGNANT NEOPLASM OF COLON: ICD-10-CM

## 2019-05-28 PROCEDURE — 45330 DIAGNOSTIC SIGMOIDOSCOPY: CPT | Performed by: INTERNAL MEDICINE

## 2019-05-28 RX ORDER — SODIUM CHLORIDE, SODIUM LACTATE, POTASSIUM CHLORIDE, CALCIUM CHLORIDE 600; 310; 30; 20 MG/100ML; MG/100ML; MG/100ML; MG/100ML
125 INJECTION, SOLUTION INTRAVENOUS CONTINUOUS
Status: DISCONTINUED | OUTPATIENT
Start: 2019-05-28 | End: 2019-06-01 | Stop reason: HOSPADM

## 2019-05-28 RX ORDER — LIDOCAINE HYDROCHLORIDE 10 MG/ML
0.5 INJECTION, SOLUTION EPIDURAL; INFILTRATION; INTRACAUDAL; PERINEURAL ONCE AS NEEDED
Status: DISCONTINUED | OUTPATIENT
Start: 2019-05-28 | End: 2019-06-01 | Stop reason: HOSPADM

## 2019-05-28 RX ORDER — PROPOFOL 10 MG/ML
INJECTION, EMULSION INTRAVENOUS AS NEEDED
Status: DISCONTINUED | OUTPATIENT
Start: 2019-05-28 | End: 2019-05-28 | Stop reason: SURG

## 2019-05-28 RX ADMIN — PROPOFOL 100 MG: 10 INJECTION, EMULSION INTRAVENOUS at 10:42

## 2019-05-28 RX ADMIN — SODIUM CHLORIDE, SODIUM LACTATE, POTASSIUM CHLORIDE, AND CALCIUM CHLORIDE: .6; .31; .03; .02 INJECTION, SOLUTION INTRAVENOUS at 10:35

## 2019-05-28 RX ADMIN — PROPOFOL 50 MG: 10 INJECTION, EMULSION INTRAVENOUS at 10:43

## 2019-05-28 RX ADMIN — PROPOFOL 50 MG: 10 INJECTION, EMULSION INTRAVENOUS at 10:44

## 2019-05-30 DIAGNOSIS — R09.82 PND (POST-NASAL DRIP): ICD-10-CM

## 2019-05-30 RX ORDER — FLUTICASONE PROPIONATE 50 MCG
SPRAY, SUSPENSION (ML) NASAL
Qty: 1 BOTTLE | Refills: 5 | Status: SHIPPED | OUTPATIENT
Start: 2019-05-30 | End: 2019-11-07 | Stop reason: SDUPTHER

## 2019-07-02 ENCOUNTER — TELEPHONE (OUTPATIENT)
Dept: FAMILY MEDICINE CLINIC | Facility: CLINIC | Age: 54
End: 2019-07-02

## 2019-07-02 NOTE — TELEPHONE ENCOUNTER
Please review----- Message from Amaya Elizalde sent at 7/2/2019 11:25 AM EDT -----  Regarding: Referral Request  Contact: [phone]  Good morning, you had given me a referral to have a colonoscopy done at which I had done but it was no good due to not being cleaned out completely  The regimen they told me to follow didn't work because my insurance wouldn't pay for the items necessary to clean me out  The doctor would like to try again but I prefer to do the alternate thing you had discussed with me during our last visit  Can you please tell me how to go about doing that? I forget what it's called but it's the "mail in"  Thank you!

## 2019-07-02 NOTE — TELEPHONE ENCOUNTER
Left message for patient to call office to inform her that we sent an order to [facility] to send patient home testing kit

## 2019-07-09 ENCOUNTER — TELEPHONE (OUTPATIENT)
Dept: NEUROLOGY | Facility: CLINIC | Age: 54
End: 2019-07-09

## 2019-07-09 DIAGNOSIS — G43.709 CHRONIC MIGRAINE WITHOUT AURA WITHOUT STATUS MIGRAINOSUS, NOT INTRACTABLE: ICD-10-CM

## 2019-07-09 NOTE — TELEPHONE ENCOUNTER
Per last [facility] [employer] message, the pt would like to try to increase aimovig to 140 mg q 30 days  See mychart message

## 2019-07-17 DIAGNOSIS — Z12.11 COLON CANCER SCREENING: Primary | ICD-10-CM

## 2019-07-18 ENCOUNTER — OFFICE VISIT (OUTPATIENT)
Dept: NEUROLOGY | Facility: CLINIC | Age: 54
End: 2019-07-18
Payer: COMMERCIAL

## 2019-07-18 VITALS
SYSTOLIC BLOOD PRESSURE: 165 MMHG | BODY MASS INDEX: 27.48 KG/M2 | WEIGHT: 171 LBS | HEART RATE: 68 BPM | RESPIRATION RATE: 16 BRPM | HEIGHT: 66 IN | DIASTOLIC BLOOD PRESSURE: 89 MMHG

## 2019-07-18 DIAGNOSIS — G43.709 CHRONIC MIGRAINE WITHOUT AURA WITHOUT STATUS MIGRAINOSUS, NOT INTRACTABLE: Primary | ICD-10-CM

## 2019-07-18 DIAGNOSIS — E78.00 HYPERCHOLESTEROLEMIA: ICD-10-CM

## 2019-07-18 DIAGNOSIS — R90.82 WHITE MATTER ABNORMALITY ON MRI OF BRAIN: ICD-10-CM

## 2019-07-18 PROCEDURE — 99214 OFFICE O/P EST MOD 30 MIN: CPT | Performed by: PSYCHIATRY & NEUROLOGY

## 2019-07-18 NOTE — PROGRESS NOTES
Patient ID: Twila Mccarty is a 48 y o  female  Assessment/Plan:    No problem-specific Assessment & Plan notes found for this encounter  Diagnoses and all orders for this visit:    Chronic migraine without aura without status migrainosus, not intractable  - improved (still has at least one migraine a week however) with Aimovig 70 mg however is experiencing significant constipation  She will try the 140 mg to see if her migraines are further better controlled  I did discuss Emgality as this has lesser chance of constipation  If she is interested she will call and let me know  White matter abnormality on MRI brain  I did discuss with her that her subcortical white matter changes can certainly be chronic microangiopathicischemic changes and these can present with increased stroke risk  Addressing her cholesterol was certainly help reduce her stroke risk  She continues to take aspirin 81 mg daily  Hypercholesteremia  - working on this with PCP by diet change  Stable from neurologic standpoint and can follow up with me or [facility] in 6 months time  Subjective:    HPI   Ms Penny J Clossey is seen in follow up for chronic migraines   She works for the [employer] as a   States migraines are much better with Aimovig 70 mg monthly  States frequency once a week and much milder  States every 4-5 weeks does have acephalgic migraine  States is not taking any rescue medication for breakthrough headaches as these are milder  States on rare occasional does take toradol which helps  Does have HLD and states is eating less cheese now to help with high cholesterol  Denies any other health changes  Denies any new deficits  Denies any new vision changes or imbalance  No other concerns at this time      "Past hx: When she awakens in the middle the night with vertigo she notes going to be a bad day the next day   She knows she is going to have a lot headaches then  [facility] also reports associated pressure diffusely in her head, noise sensitivity more than light sensitivity   Her headache is located in the back of her head left greater than right and radiates around the ears, and sometimes into the frontal regions   It feels like she is being hit in the back of her head with a bat L>R      In the past she weaned and stopped Depakote due to sedation and waking, and started the Topamax   She feels that the Topamax is working a little bit better for migraine prevention and denies side effects except for mild n/t in the extremities at times; she does not think it is causing cognitive deficits      When she gets a HA she will usually take Toradol plus or minus Compazine for nausea which works well  Lillie Le continues gabapentin without side effects   States does not use Excedrin any longer as recommended at the last visit      Verapamil gave her heart palpitations thus stopped this as recommended by us prior to her last visit here      For vertigo spells-- States in the past an ER doctor showed her vestibular exercises to do and this seems to help a bit      Repeat imaging reviewed and no new findings      She continues aspirin 81 mg daily "    The following portions of the patient's history were reviewed and updated as appropriate: allergies, current medications, past family history, past medical history, past social history, past surgical history and problem list and ROS         Objective:    Blood pressure 165/89, pulse 68, resp  rate 16, height 5' 6" (1 676 m), weight 77 6 kg (171 lb), not currently breastfeeding  Physical Exam   Constitutional: She is oriented to person, place, and time  She appears well-developed and well-nourished  HENT:   Head: Normocephalic and atraumatic  Eyes: Pupils are equal, round, and reactive to light  Neck: Normal range of motion  Cardiovascular: Normal rate and regular rhythm  Pulmonary/Chest: Effort normal    Abdominal: Soft     Musculoskeletal: Normal range of motion  She exhibits no edema or tenderness  Neurological: She is alert and oriented to person, place, and time  She has normal strength and normal reflexes  Coordination normal    Nursing note and vitals reviewed  Neurological Exam  Mental Status  Alert  Oriented to person, place, time and situation  Recent and remote memory are intact  no dysarthria present  Language is fluent with no aphasia  Attention and concentration are normal  Fund of knowledge is appropriate for level of education  Cranial Nerves  CN II: Visual fields full to confrontation  Right funduscopic exam: not visualized  Left funduscopic exam: disc intact  CN III, IV, VI: Extraocular movements intact bilaterally  Pupils equal round and reactive to light bilaterally  CN V: Facial sensation is normal   CN VII: Full and symmetric facial movement  CN VIII: Hearing is normal   CN IX, X: Palate elevates symmetrically  Normal gag reflex  CN XI: Shoulder shrug strength is normal   CN XII: Tongue midline without atrophy or fasciculations  Motor   Normal muscle tone  Strength is 5/5 throughout all four extremities  Sensory  Sensation is intact to light touch, pinprick, vibration and proprioception in all four extremities  Light touch is normal in upper and lower extremities  Temperature is normal in upper and lower extremities  Vibration is normal in upper and lower extremities  No right-sided hemispatial neglect  No left-sided hemispatial neglect  Reflexes  Deep tendon reflexes are 2+ and symmetric in all four extremities with downgoing toes bilaterally  Coordination  Finger-to-nose, rapid alternating movements and heel-to-shin normal bilaterally without dysmetria  Gait  Casual gait is normal including stance, stride, and arm swing  Romberg is absent  ROS:    Review of Systems   Constitutional: Negative  Negative for appetite change and fever  HENT: Negative    Negative for hearing loss, tinnitus, trouble swallowing and voice change  Eyes: Negative  Negative for photophobia and pain  Respiratory: Negative  Negative for shortness of breath  Cardiovascular: Negative  Negative for palpitations  Gastrointestinal: Negative  Negative for nausea and vomiting  Endocrine: Negative  Negative for cold intolerance and heat intolerance  Genitourinary: Negative  Negative for dysuria, frequency and urgency  Musculoskeletal: Negative  Negative for myalgias and neck pain  Skin: Negative  Negative for rash  Neurological: Positive for headaches  Negative for dizziness, tremors, seizures, syncope, facial asymmetry, speech difficulty, weakness, light-headedness and numbness  Patient states that she has headaches that come and go off and on  Patient stated that she has a headache today 3-10 pain today  Hematological: Negative  Does not bruise/bleed easily  Psychiatric/Behavioral: Negative  Negative for confusion, hallucinations and sleep disturbance

## 2019-07-31 DIAGNOSIS — G43.719 CHRONIC MIGRAINE WITHOUT AURA, INTRACTABLE, WITHOUT STATUS MIGRAINOSUS: Primary | ICD-10-CM

## 2019-08-01 ENCOUNTER — DOCUMENTATION (OUTPATIENT)
Dept: NEUROLOGY | Facility: CLINIC | Age: 54
End: 2019-08-01

## 2019-08-01 NOTE — PROGRESS NOTES
Spoke to pt after call to the script line about Emgality and julia help to navigate this pt and the med

## 2019-08-02 ENCOUNTER — DOCUMENTATION (OUTPATIENT)
Dept: NEUROLOGY | Facility: CLINIC | Age: 54
End: 2019-08-02

## 2019-08-31 DIAGNOSIS — G43.709 CHRONIC MIGRAINE WITHOUT AURA WITHOUT STATUS MIGRAINOSUS, NOT INTRACTABLE: ICD-10-CM

## 2019-08-31 DIAGNOSIS — G43.109 VERTIGINOUS MIGRAINE: ICD-10-CM

## 2019-09-03 RX ORDER — VENLAFAXINE HYDROCHLORIDE 150 MG/1
CAPSULE, EXTENDED RELEASE ORAL
Qty: 90 CAPSULE | Refills: 1 | Status: SHIPPED | OUTPATIENT
Start: 2019-09-03 | End: 2020-02-26

## 2019-09-10 RX ORDER — ERENUMAB-AOOE 140 MG/ML
INJECTION, SOLUTION SUBCUTANEOUS
Refills: 2 | COMMUNITY
Start: 2019-08-01 | End: 2020-01-28

## 2019-09-28 ENCOUNTER — OFFICE VISIT (OUTPATIENT)
Dept: URGENT CARE | Facility: MEDICAL CENTER | Age: 54
End: 2019-09-28
Payer: COMMERCIAL

## 2019-09-28 VITALS
RESPIRATION RATE: 19 BRPM | WEIGHT: 174 LBS | HEIGHT: 66 IN | BODY MASS INDEX: 27.97 KG/M2 | TEMPERATURE: 98 F | OXYGEN SATURATION: 100 % | DIASTOLIC BLOOD PRESSURE: 76 MMHG | HEART RATE: 69 BPM | SYSTOLIC BLOOD PRESSURE: 122 MMHG

## 2019-09-28 DIAGNOSIS — L30.9 ECZEMA OF BOTH HANDS: Primary | ICD-10-CM

## 2019-09-28 PROCEDURE — 99213 OFFICE O/P EST LOW 20 MIN: CPT | Performed by: PHYSICIAN ASSISTANT

## 2019-09-28 RX ORDER — PREDNISONE 20 MG/1
20 TABLET ORAL DAILY
Qty: 5 TABLET | Refills: 0 | Status: SHIPPED | OUTPATIENT
Start: 2019-09-28 | End: 2019-10-03

## 2019-09-28 NOTE — PATIENT INSTRUCTIONS
Please take steroids as directed for itchiness /rash   please use good barrier cream such as Eucerin or Aquaphor   cotton lined gloves may be a better option  Follow up with PCP in symptoms do not improve    Eczema   WHAT YOU NEED TO KNOW:   Eczema, or atopic dermatitis, is an itchy, red skin rash  It is a long-term condition that may cause flare-ups for the rest of your life  DISCHARGE INSTRUCTIONS:   Return to the emergency department if:   · You develop a fever or have red streaks going up your arm or leg  · Your rash gets more swollen, red, or hot  Contact your healthcare provider if:   · Most of your skin is red, swollen, painful, and covered with scales  · You develop bloody, red, painful crusts  · Your skin blisters and oozes white or yellow pus  · You have questions about your condition or care  Medicines:   · Medicines , such as immunosuppressants, help reduce itching, redness, pain, and swelling  They may be given as a cream or pill  You may also receive antihistamines to reduce itching, or antibiotics if you have a skin infection  · Take your medicine as directed  Contact your healthcare provider if you think your medicine is not helping or if you have side effects  Tell him of her if you are allergic to any medicine  Keep a list of the medicines, vitamins, and herbs you take  Include the amounts, and when and why you take them  Bring the list or the pill bottles to follow-up visits  Carry your medicine list with you in case of an emergency  Manage eczema:   · Do not scratch  Pat or press on your skin for relief from itching  Your symptoms will get worse if you scratch  Keep your fingernails short so you do not tear your skin if you do scratch  · Keep your skin moist   Rub lotion, cream or ointment into your skin right after a bath or shower when your skin is still damp  Ask your healthcare provider what to use and how often to use it      · Take baths or showers  with warm water for 10 minutes or less  Use mild bar soap  Ask your healthcare provider for the best soap for you to use  · Wear cotton clothes  Wear loose-fitting clothes made from cotton or cotton blends  Avoid wool  · Use a humidifier  to add moisture to the air in your home  · Avoid changes in temperature , especially activities that cause you to sweat a lot because this can cause itching  Remove blankets from your bed if you get hot while you sleep  · Avoid allergens, dust, and skin irritants  Do not let pets inside your home  Do not use perfume, fabric softener, or makeup that burns or itches  Follow up with your healthcare provider as directed:  Write down your questions so you remember to ask them during your visits  © 2017 [facility] Information is for End User's use only and may not be sold, redistributed or otherwise used for commercial purposes  All illustrations and images included in CareNotes® are the copyrighted property of A D A M , Inc  or Sesar Cooper  The above information is an  only  It is not intended as medical advice for individual conditions or treatments  Talk to your doctor, nurse or pharmacist before following any medical regimen to see if it is safe and effective for you

## 2019-09-28 NOTE — PROGRESS NOTES
[facility]        NAME: Noel Bailey is a 48 y o  female  : 1965    MRN: [id number]  DATE: 2019  TIME: 11:43 AM    Assessment and Plan   Eczema of both hands [L30 9]  1  Eczema of both hands  predniSONE 20 mg tablet     Did discuss stopping use of gloves at work however patient does not want to do this as she feels she has to touch a lot of dirty things as a   Discussed using good barrier creams and taking gloves off and switching when her hands become sweaty  Patient Instructions      Please take steroids as directed for itchiness /rash   please use good barrier cream such as Eucerin or Aquaphor   cotton lined gloves may be a better option  Follow up with PCP in symptoms do not improve    Chief Complaint     Chief Complaint   Patient presents with    Rash     x3 weeks with a rash on both hands, unsure of cause, redness and itching (denies any other sx)         History of Present Illness        Patient is a 80-year-old female who presents today with rash of bilateral hands  The rash is not anywhere else on the body  She describes it as itchy in nature  Patient is a  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,   However she has been using the same gloves for the past 3 years without problem  She has not changed gloves, she does not have a latex allergy either  She denies any new medications, creams, detergents  She has used hydrocortisone with no relief  Review of Systems   Review of Systems   Constitutional: Negative for fever  HENT: Negative for trouble swallowing  Eyes: Negative for redness  Respiratory: Negative for shortness of breath  Cardiovascular: Negative for chest pain  Musculoskeletal: Negative for arthralgias  Skin: Positive for rash           Current Medications       Current Outpatient Medications:     cetirizine (ZyrTEC) 10 mg tablet, Take 10 mg by mouth, Disp: , Rfl:     fluticasone (FLONASE) 50 mcg/act nasal spray, SPRAY 1 SPRAY INTO EACH NOSTRIL EVERY DAY, Disp: 1 Bottle, Rfl: 5    gabapentin (NEURONTIN) 100 mg capsule, 400 mg qhs, Disp: 120 capsule, Rfl: 2    Galcanezumab-gnlm (EMGALITY) 120 MG/ML SOAJ, Inject 1 pen under the skin SC in thigh or stomach for a total of two injections the first time, then one injection monthly , Disp: 2 pen, Rfl: 0    ketorolac (TORADOL) 10 mg tablet, TAKE 1 TABLET BY MOUTH 3 TIMES A DAY AT ONSET OF HEADACHE  MAX 3 TIMES A WEEK, Disp: 30 tablet, Rfl: 0    meclizine (ANTIVERT) 12 5 MG tablet, 1 tab qhs prn dizziness and up to TID as needed, Disp: 30 tablet, Rfl: 0    MULTIPLE VITAMINS ESSENTIAL PO, Take by mouth, Disp: , Rfl:     prochlorperazine (COMPAZINE) 5 mg tablet, TAKE 1 TABLET BY MOUTH 3 TIMES A DAY AS NEEDED FOR HEADACHE AND FOR NAUSEA, Disp: 30 tablet, Rfl: 1    SUMAtriptan (IMITREX) 25 mg tablet, Take one at migraine onset, and repeat after 2 hours if needed   Max 2 per day, 4 per week , Disp: 9 tablet, Rfl: 0    venlafaxine (EFFEXOR-XR) 150 mg 24 hr capsule, TAKE 1 CAPSULE BY MOUTH EVERY DAY, Disp: 90 capsule, Rfl: 1    AIMOVIG 140 MG/ML SOAJ, INJECT 140 MG UNDER THE SKIN EVERY 30 (THIRTY) DAYS, Disp: , Rfl: 2    Galcanezumab-gnlm (EMGALITY) 120 MG/ML SOAJ, Inject one injection under skin (SC) in thigh or stomach once monthly, Disp: 1 pen, Rfl: 3    predniSONE 20 mg tablet, Take 1 tablet (20 mg total) by mouth daily for 5 days, Disp: 5 tablet, Rfl: 0    Current Allergies     Allergies as of 09/28/2019 - Reviewed 09/28/2019   Allergen Reaction Noted    Penicillins  12/04/2016    Doxycycline  12/04/2016    Erythromycin  12/04/2016    Other  12/04/2016            The following portions of the patient's history were reviewed and updated as appropriate: allergies, current medications, past family history, past medical history, past social history, past surgical history and problem list      Past Medical History:   Diagnosis Date    Abnormal Pap smear of cervix 1989    all normal since    Asthma     Carbon monoxide exposure 2/6/2018    Chronic back pain     Lump of skin     last assessed 11/21/13    Migraine     Sinus bradycardia     last assessed 10/25/16    Varicella        Past Surgical History:   Procedure Laterality Date    APPENDECTOMY      BREAST LUMPECTOMY      right breast was benign    COLONOSCOPY      TONSILLECTOMY      VARICOSE VEIN SURGERY      WISDOM TOOTH EXTRACTION Bilateral        Family History   Problem Relation Age of Onset    Heart failure Mother         CHF    Heart attack Father     Colon cancer Father     Hypertension Brother     Diabetes Maternal Aunt     Stroke Maternal Aunt     Arthritis Family          Medications have been verified  Objective   /76   Pulse 69   Temp 98 °F (36 7 °C) (Temporal)   Resp 19   Ht 5' 6" (1 676 m)   Wt 78 9 kg (174 lb)   SpO2 100%   BMI 28 08 kg/m²        Physical Exam     Physical Exam   Constitutional: She appears well-developed and well-nourished  HENT:   Mouth/Throat: Oropharynx is clear and moist    Cardiovascular: Normal rate and regular rhythm  Pulmonary/Chest: Effort normal and breath sounds normal    Skin: Skin is warm and dry  Capillary refill takes less than 2 seconds  Rash noted     Dry, erythematous patches of skin on both hands  No signs of infection

## 2019-11-07 DIAGNOSIS — R09.82 PND (POST-NASAL DRIP): ICD-10-CM

## 2019-11-07 RX ORDER — FLUTICASONE PROPIONATE 50 MCG
SPRAY, SUSPENSION (ML) NASAL
Qty: 16 ML | Refills: 5 | Status: SHIPPED | OUTPATIENT
Start: 2019-11-07 | End: 2020-04-13

## 2019-11-19 DIAGNOSIS — G43.109 VERTIGINOUS MIGRAINE: ICD-10-CM

## 2019-11-19 RX ORDER — GABAPENTIN 100 MG/1
CAPSULE ORAL
Qty: 120 CAPSULE | Refills: 2 | Status: SHIPPED | OUTPATIENT
Start: 2019-11-19 | End: 2020-02-09

## 2019-12-17 ENCOUNTER — TELEPHONE (OUTPATIENT)
Dept: GASTROENTEROLOGY | Facility: AMBULARY SURGERY CENTER | Age: 54
End: 2019-12-17

## 2020-01-17 ENCOUNTER — TELEPHONE (OUTPATIENT)
Dept: NEUROLOGY | Facility: CLINIC | Age: 55
End: 2020-01-17

## 2020-01-17 NOTE — TELEPHONE ENCOUNTER
[facility] for patient to contact office regarding rescheduling appt with Dr Miller List 1/21/20 due to an unexpected leave  Please transfer call when patient calls back  Thank you

## 2020-01-28 DIAGNOSIS — G43.719 CHRONIC MIGRAINE WITHOUT AURA, INTRACTABLE, WITHOUT STATUS MIGRAINOSUS: ICD-10-CM

## 2020-02-03 ENCOUNTER — TELEPHONE (OUTPATIENT)
Dept: NEUROLOGY | Facility: CLINIC | Age: 55
End: 2020-02-03

## 2020-02-03 NOTE — TELEPHONE ENCOUNTER
PA request for [facility] received for patient  PA submitted through [facility] clinical questions

## 2020-02-08 DIAGNOSIS — G43.109 VERTIGINOUS MIGRAINE: ICD-10-CM

## 2020-02-09 RX ORDER — GABAPENTIN 100 MG/1
CAPSULE ORAL
Qty: 120 CAPSULE | Refills: 5 | Status: SHIPPED | OUTPATIENT
Start: 2020-02-09 | End: 2020-07-18

## 2020-02-12 ENCOUNTER — TELEPHONE (OUTPATIENT)
Dept: NEUROLOGY | Facility: CLINIC | Age: 55
End: 2020-02-12

## 2020-02-14 ENCOUNTER — OFFICE VISIT (OUTPATIENT)
Dept: NEUROLOGY | Facility: CLINIC | Age: 55
End: 2020-02-14
Payer: COMMERCIAL

## 2020-02-14 VITALS
BODY MASS INDEX: 28.25 KG/M2 | DIASTOLIC BLOOD PRESSURE: 74 MMHG | WEIGHT: 175 LBS | SYSTOLIC BLOOD PRESSURE: 121 MMHG | HEART RATE: 83 BPM

## 2020-02-14 DIAGNOSIS — G43.709 CHRONIC MIGRAINE WITHOUT AURA WITHOUT STATUS MIGRAINOSUS, NOT INTRACTABLE: Primary | ICD-10-CM

## 2020-02-14 DIAGNOSIS — R42 VERTIGO: ICD-10-CM

## 2020-02-14 DIAGNOSIS — F40.298 PHONOPHOBIA: ICD-10-CM

## 2020-02-14 DIAGNOSIS — G43.109 VERTIGINOUS MIGRAINE: ICD-10-CM

## 2020-02-14 PROCEDURE — 3074F SYST BP LT 130 MM HG: CPT | Performed by: PHYSICIAN ASSISTANT

## 2020-02-14 PROCEDURE — 3078F DIAST BP <80 MM HG: CPT | Performed by: PHYSICIAN ASSISTANT

## 2020-02-14 PROCEDURE — 1036F TOBACCO NON-USER: CPT | Performed by: PHYSICIAN ASSISTANT

## 2020-02-14 PROCEDURE — 99214 OFFICE O/P EST MOD 30 MIN: CPT | Performed by: PHYSICIAN ASSISTANT

## 2020-02-14 RX ORDER — NICOTINE POLACRILEX 2 MG
5000 LOZENGE BUCCAL DAILY
COMMUNITY
End: 2021-07-12

## 2020-02-14 RX ORDER — ASPIRIN 81 MG/1
81 TABLET, CHEWABLE ORAL DAILY
COMMUNITY

## 2020-02-14 RX ORDER — NORTRIPTYLINE HYDROCHLORIDE 10 MG/1
CAPSULE ORAL
Qty: 60 CAPSULE | Refills: 2 | Status: SHIPPED | OUTPATIENT
Start: 2020-02-14 | End: 2020-05-03

## 2020-02-14 RX ORDER — MAGNESIUM 200 MG
200 TABLET ORAL 2 TIMES DAILY
COMMUNITY

## 2020-02-14 RX ORDER — PROCHLORPERAZINE MALEATE 5 MG/1
TABLET ORAL
Qty: 30 TABLET | Refills: 2 | Status: SHIPPED | OUTPATIENT
Start: 2020-02-14 | End: 2020-05-20

## 2020-02-14 RX ORDER — KETOROLAC TROMETHAMINE 10 MG/1
TABLET, FILM COATED ORAL
Qty: 10 TABLET | Refills: 2 | Status: SHIPPED | OUTPATIENT
Start: 2020-02-14 | End: 2020-10-01 | Stop reason: SDUPTHER

## 2020-02-14 NOTE — ASSESSMENT & PLAN NOTE
Migraines are slightly worse since stopping AImovig, however it caused constipation and was denied by her insurance and also caused significant constipation so she would have stopped it anyway  Emgality is now on board and she reports approx 60-65% relief of migraines with this  Denies s/e  Plan:  Continue emgality q30 days  Increase magnesium to 200 mg BID (currently on 100 mg BID)  Trial Melatonin- 3-12 mg at bed (not every night)- for migraines and insomnia  Will trial Nortriptyline if melatonin ineffective  S/e reviewed  Can consider Ajovy

## 2020-02-14 NOTE — PROGRESS NOTES
Patient ID: Raegan Benoit is a 47 y o  female  Assessment/Plan:    Chronic migraine without aura without status migrainosus, not intractable  Migraines are slightly worse since stopping AImovig, however it caused constipation and was denied by her insurance and also caused significant constipation so she would have stopped it anyway  Emgality is now on board and she reports approx 60-65% relief of migraines with this  Denies s/e  Plan:  Continue emgality q30 days  Increase magnesium to 200 mg BID (currently on 100 mg BID)  Trial Melatonin- 3-12 mg at bed (not every night)- for migraines and insomnia  Will trial Nortriptyline if melatonin ineffective  S/e reviewed  Can consider Ajovy  Diagnoses and all orders for this visit:    Chronic migraine without aura without status migrainosus, not intractable  -     ketorolac (TORADOL) 10 mg tablet; 1 tab q6 hours prn migraine (with compazine if needed)  Max 2 per day and 3 per week  -     prochlorperazine (COMPAZINE) 5 mg tablet; 1 tab q6 hours prn migraine (with toradol if needed)  -     nortriptyline (PAMELOR) 10 mg capsule; 1 tab q h s , after 1 week increase to 2 tabs q h s  If tolerated  Vertigo    Vertiginous migraine    Phonophobia    Other orders  -     Ascorbic Acid (CEDRICK-C PO); Take by mouth  -     Cyanocobalamin (B-12) 5000 MCG SUBL; Place 5,000 mcg under the tongue daily  -     Magnesium 200 MG TABS; Take 200 mg by mouth 2 (two) times a day  -     aspirin 81 mg chewable tablet; Chew 81 mg daily       The patient should not hesitate to call me prior to her follow up with any questions or concerns  The patient was instructed to urgently call 911 or present to the nearest emergency room with any new or worsening neurological deficits      I have spent 30 minutes with Patient and family today in which greater than 50% of this time was spent in counseling/coordination of care regarding Risks and benefits of tx options, Intructions for management, Patient and family education, Impressions and med s/e  Subjective:    HPI    Ms  Monse Martines is seen in follow up for chronic migraines   She works for the [employer] as a   Her  is here today  Since last seen the patient changed from [address] [place] [address] to [facility]  She states that the 140 mg of Aimovig worsened her constipation, and even on with MiraLax, stool softener, magnesium, fiber, etc, the constipation was still a problem  Emgality reduced her migraines by 60-65%, whereas Aimovig reduced her migraines by almost 100%  She does not remember if she had constipation with the 70 mg dose  [facility] was denied by her insurance anyway  States she is still getting "auras" which involve  Episode of headache with dizziness and very sensitivity to high-pitched sounds, screeching, even her dogs barking  When she gets this symptom she develops confusion and disorientation  It lasts several hours and Toradol plus Compazine cocktail helps  Sleeping well but states she wakes up frequently in the middle the night  States some irritability at work when her coworkers annoy her or bother her  She likes to work alone  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  This route is 5 hours and she has difficulty stopping to go the bathroom so she does not drink a lot of water at this time; otherwise she drinks a lot of water during the day  As noted at the last visit, migraine frequency is about once a week and much milder  States every 4-5 weeks does have acephalgic migraine  Does have HLD and states is eating less cheese now to help with high cholesterol  Takes 81 mg asa daily  Stopped gabapentin since last seen due to swelling  Continues 100 mg magnesium BID  Also takes B12 and vit C as noted on med list     Denies any other health changes  Denies any new deficits  Denies any new vision changes or imbalance    No other concerns at this time      Prior documentation:  When she awakens in the middle the night with vertigo she notes going to be a bad day the next day   She knows she is going to have a lot headaches then  Sonya Navarro also reports associated pressure diffusely in her head, noise sensitivity more than light sensitivity   Her headache is located in the back of her head left greater than right and radiates around the ears, and sometimes into the frontal regions   It feels like she is being hit in the back of her head with a bat L>R      In the past she weaned and stopped Depakote due to sedation and waking, and started the Topamax   She feels that the Topamax is working a little bit better for migraine prevention and denies side effects except for mild n/t in the extremities at times; she does not think it is causing cognitive deficits      When she gets a HA she will usually take Toradol plus or minus Compazine for nausea which works well  Damaris Cousins use Excedrin any longer as recommended at the last visit      Verapamil gave her heart palpitations thus stopped this as recommended by us prior to her last visit here      For vertigo spells-- States in the past an ER doctor showed her vestibular exercises to do and this seems to help a bit      Repeat imaging reviewed and no new findings  The following portions of the patient's history were reviewed and updated as appropriate:   She  has a past medical history of Abnormal Pap smear of cervix (1989), Asthma, Carbon monoxide exposure (2/6/2018), Chronic back pain, Lump of skin, Migraine, Sinus bradycardia, and Varicella    She   Patient Active Problem List    Diagnosis Date Noted    Phonophobia 02/14/2020    Vertiginous migraine 04/22/2019    Screening for colon cancer 04/18/2019    Encounter for annual routine gynecological examination 03/26/2019    Overweight (BMI 25 0-29 9) 03/01/2019    Anxiety and depression 03/01/2019    Varicose veins of both lower extremities with pain 03/01/2019    PND (post-nasal drip) 03/01/2019    Chronic migraine without aura without status migrainosus, not intractable 10/08/2018    Vertigo 02/06/2018    Hypercholesterolemia 10/22/2014     She  has a past surgical history that includes Appendectomy; Tonsillectomy; [place] tooth extraction (Bilateral); Breast lumpectomy; Colonoscopy; and Varicose vein surgery  Her family history includes Arthritis in her family; Colon cancer in her father; Diabetes in her maternal aunt; Heart attack in her father; Heart failure in her mother; Hypertension in her brother; Stroke in her maternal aunt  She  reports that she quit smoking about 22 years ago  She has never used smokeless tobacco  She reports that she drinks about 7 0 standard drinks of alcohol per week  She reports that she does not use drugs  Current Outpatient Medications   Medication Sig Dispense Refill    Ascorbic Acid (CEDRICK-C PO) Take by mouth      aspirin 81 mg chewable tablet Chew 81 mg daily      cetirizine (ZyrTEC) 10 mg tablet Take 10 mg by mouth      Cyanocobalamin (B-12) 5000 MCG SUBL Place 5,000 mcg under the tongue daily      fluticasone (FLONASE) 50 mcg/act nasal spray SPRAY 1 SPRAY INTO EACH NOSTRIL EVERY DAY 16 mL 5    Galcanezumab-gnlm (EMGALITY) 120 MG/ML SOAJ Inject 1 pen under the skin q30 days 1 pen 5    ketorolac (TORADOL) 10 mg tablet 1 tab q6 hours prn migraine (with compazine if needed)  Max 2 per day and 3 per week   10 tablet 2    Magnesium 200 MG TABS Take 200 mg by mouth 2 (two) times a day      meclizine (ANTIVERT) 12 5 MG tablet 1 tab qhs prn dizziness and up to TID as needed 30 tablet 0    MULTIPLE VITAMINS ESSENTIAL PO Take by mouth      prochlorperazine (COMPAZINE) 5 mg tablet 1 tab q6 hours prn migraine (with toradol if needed) 30 tablet 2    venlafaxine (EFFEXOR-XR) 150 mg 24 hr capsule TAKE 1 CAPSULE BY MOUTH EVERY DAY 90 capsule 1    gabapentin (NEURONTIN) 100 mg capsule 1 CAPSULE IN THE MORNING, 1 CAPSULE AT NOON AND 2 CAPSULES AT NIGHT TIME  (Patient not taking: Reported on 2/14/2020) 120 capsule 5    nortriptyline (PAMELOR) 10 mg capsule 1 tab q h s , after 1 week increase to 2 tabs q h s  If tolerated  60 capsule 2     No current facility-administered medications for this visit  She is allergic to penicillins; doxycycline; erythromycin; and other            Objective:    Blood pressure 121/74, pulse 83, weight 79 4 kg (175 lb), not currently breastfeeding  Physical Exam    Neurological Exam  Vital signs reviewed  Well developed, well nourished  Head: Normocephalic, atraumatic  Neck: Neck flexors 5/5  CN 2-12: intact and symmetric, including EOMs which are normal b/l and PERRL  Fundi b/l are normal to crude ophthalmological examination  MSK: 5/5 t/o  ROM normal x all 4 extr  Sensation: Inact to LT and temp x4 extr  Reflexes: 2+ and symmetric in all 4 extr  Coordination: Nml x4 extr  Gait: Steady normal gait  ROS:    Review of Systems   Constitutional: Negative  Negative for appetite change and fever  HENT: Positive for trouble swallowing  Negative for hearing loss, tinnitus and voice change  Eyes: Negative  Negative for photophobia and pain  Respiratory: Negative  Negative for shortness of breath  Cardiovascular: Negative  Negative for palpitations  Gastrointestinal: Negative  Negative for nausea and vomiting  Endocrine: Negative  Negative for cold intolerance and heat intolerance  Genitourinary: Positive for urgency  Negative for dysuria and frequency  Musculoskeletal: Negative  Negative for myalgias and neck pain  Skin: Negative  Negative for rash  Neurological: Positive for dizziness, light-headedness, numbness and headaches  Negative for tremors, seizures, syncope, facial asymmetry, speech difficulty and weakness  Hematological: Negative  Does not bruise/bleed easily  Psychiatric/Behavioral: Positive for decreased concentration   Negative for confusion, hallucinations and sleep disturbance  The patient is nervous/anxious  The following portions of the patient's history were reviewed and updated as appropriate: allergies, current medications/ medication history, past family history, past medical history, past social history, past surgical history and problem list     Review of systems was reviewed and otherwise unremarkable from a neurological perspective

## 2020-02-14 NOTE — PATIENT INSTRUCTIONS
Melatonin- 3-12 mg at bed (not every night)  Magnesium- increase 200 mg 2x/day  Consider nortriptyline  Ajovy- the other injectable for migraines

## 2020-02-25 DIAGNOSIS — G43.709 CHRONIC MIGRAINE WITHOUT AURA WITHOUT STATUS MIGRAINOSUS, NOT INTRACTABLE: ICD-10-CM

## 2020-02-25 DIAGNOSIS — G43.109 VERTIGINOUS MIGRAINE: ICD-10-CM

## 2020-02-26 RX ORDER — VENLAFAXINE HYDROCHLORIDE 150 MG/1
CAPSULE, EXTENDED RELEASE ORAL
Qty: 90 CAPSULE | Refills: 3 | Status: SHIPPED | OUTPATIENT
Start: 2020-02-26 | End: 2020-12-28

## 2020-04-13 DIAGNOSIS — R09.82 PND (POST-NASAL DRIP): ICD-10-CM

## 2020-04-13 RX ORDER — FLUTICASONE PROPIONATE 50 MCG
SPRAY, SUSPENSION (ML) NASAL
Qty: 16 ML | Refills: 0 | Status: SHIPPED | OUTPATIENT
Start: 2020-04-13 | End: 2020-06-09

## 2020-04-17 ENCOUNTER — TELEMEDICINE (OUTPATIENT)
Dept: FAMILY MEDICINE CLINIC | Facility: CLINIC | Age: 55
End: 2020-04-17
Payer: COMMERCIAL

## 2020-04-17 DIAGNOSIS — E78.00 HYPERCHOLESTEROLEMIA: ICD-10-CM

## 2020-04-17 DIAGNOSIS — F32.A ANXIETY AND DEPRESSION: ICD-10-CM

## 2020-04-17 DIAGNOSIS — F41.9 ANXIETY AND DEPRESSION: ICD-10-CM

## 2020-04-17 DIAGNOSIS — M13.0 POLYARTHRITIS OF MULTIPLE SITES: ICD-10-CM

## 2020-04-17 DIAGNOSIS — E66.3 OVERWEIGHT (BMI 25.0-29.9): ICD-10-CM

## 2020-04-17 DIAGNOSIS — G43.709 CHRONIC MIGRAINE WITHOUT AURA WITHOUT STATUS MIGRAINOSUS, NOT INTRACTABLE: ICD-10-CM

## 2020-04-17 DIAGNOSIS — Z12.39 BREAST CANCER SCREENING: ICD-10-CM

## 2020-04-17 DIAGNOSIS — Z00.00 ROUTINE GENERAL MEDICAL EXAMINATION AT A HEALTH CARE FACILITY: Primary | ICD-10-CM

## 2020-04-17 PROBLEM — Z12.11 SCREENING FOR COLON CANCER: Status: RESOLVED | Noted: 2019-04-18 | Resolved: 2020-04-17

## 2020-04-17 PROCEDURE — 99214 OFFICE O/P EST MOD 30 MIN: CPT | Performed by: PHYSICIAN ASSISTANT

## 2020-05-03 DIAGNOSIS — G43.709 CHRONIC MIGRAINE WITHOUT AURA WITHOUT STATUS MIGRAINOSUS, NOT INTRACTABLE: ICD-10-CM

## 2020-05-03 RX ORDER — NORTRIPTYLINE HYDROCHLORIDE 10 MG/1
CAPSULE ORAL
Qty: 60 CAPSULE | Refills: 3 | Status: SHIPPED | OUTPATIENT
Start: 2020-05-03 | End: 2020-08-23

## 2020-05-18 ENCOUNTER — APPOINTMENT (OUTPATIENT)
Dept: URGENT CARE | Age: 55
End: 2020-05-18
Payer: COMMERCIAL

## 2020-05-18 ENCOUNTER — DOCUMENTATION (OUTPATIENT)
Dept: URGENT CARE | Age: 55
End: 2020-05-18

## 2020-05-18 ENCOUNTER — TELEPHONE (OUTPATIENT)
Dept: NEUROLOGY | Facility: CLINIC | Age: 55
End: 2020-05-18

## 2020-05-18 ENCOUNTER — TELEPHONE (OUTPATIENT)
Dept: FAMILY MEDICINE CLINIC | Facility: CLINIC | Age: 55
End: 2020-05-18

## 2020-05-18 ENCOUNTER — TELEMEDICINE (OUTPATIENT)
Dept: FAMILY MEDICINE CLINIC | Facility: CLINIC | Age: 55
End: 2020-05-18
Payer: COMMERCIAL

## 2020-05-18 DIAGNOSIS — Z20.828 SARS-ASSOCIATED CORONAVIRUS EXPOSURE: Primary | ICD-10-CM

## 2020-05-18 DIAGNOSIS — Z20.828 SARS-ASSOCIATED CORONAVIRUS EXPOSURE: ICD-10-CM

## 2020-05-18 DIAGNOSIS — G43.709 CHRONIC MIGRAINE WITHOUT AURA WITHOUT STATUS MIGRAINOSUS, NOT INTRACTABLE: Primary | ICD-10-CM

## 2020-05-18 PROCEDURE — U0003 INFECTIOUS AGENT DETECTION BY NUCLEIC ACID (DNA OR RNA); SEVERE ACUTE RESPIRATORY SYNDROME CORONAVIRUS 2 (SARS-COV-2) (CORONAVIRUS DISEASE [COVID-19]), AMPLIFIED PROBE TECHNIQUE, MAKING USE OF HIGH THROUGHPUT TECHNOLOGIES AS DESCRIBED BY CMS-2020-01-R: HCPCS | Performed by: PHYSICIAN ASSISTANT

## 2020-05-18 PROCEDURE — 99214 OFFICE O/P EST MOD 30 MIN: CPT | Performed by: NURSE PRACTITIONER

## 2020-05-19 ENCOUNTER — TELEPHONE (OUTPATIENT)
Dept: NEUROLOGY | Facility: CLINIC | Age: 55
End: 2020-05-19

## 2020-05-19 RX ORDER — METOCLOPRAMIDE 10 MG/1
10 TABLET ORAL EVERY 6 HOURS PRN
Qty: 30 TABLET | Refills: 0 | Status: SHIPPED | OUTPATIENT
Start: 2020-05-19 | End: 2020-10-01 | Stop reason: ALTCHOICE

## 2020-05-20 ENCOUNTER — TELEPHONE (OUTPATIENT)
Dept: NEUROLOGY | Facility: CLINIC | Age: 55
End: 2020-05-20

## 2020-05-20 ENCOUNTER — TELEPHONE (OUTPATIENT)
Dept: FAMILY MEDICINE CLINIC | Facility: CLINIC | Age: 55
End: 2020-05-20

## 2020-05-20 ENCOUNTER — TELEMEDICINE (OUTPATIENT)
Dept: NEUROLOGY | Facility: CLINIC | Age: 55
End: 2020-05-20
Payer: COMMERCIAL

## 2020-05-20 ENCOUNTER — TELEMEDICINE (OUTPATIENT)
Dept: FAMILY MEDICINE CLINIC | Facility: CLINIC | Age: 55
End: 2020-05-20
Payer: COMMERCIAL

## 2020-05-20 ENCOUNTER — PATIENT MESSAGE (OUTPATIENT)
Dept: NEUROLOGY | Facility: CLINIC | Age: 55
End: 2020-05-20

## 2020-05-20 VITALS
DIASTOLIC BLOOD PRESSURE: 85 MMHG | BODY MASS INDEX: 27.32 KG/M2 | SYSTOLIC BLOOD PRESSURE: 146 MMHG | WEIGHT: 170 LBS | HEIGHT: 66 IN

## 2020-05-20 DIAGNOSIS — R51.9 DAILY HEADACHE: ICD-10-CM

## 2020-05-20 DIAGNOSIS — Z20.828 EXPOSURE TO SARS-ASSOCIATED CORONAVIRUS: Primary | ICD-10-CM

## 2020-05-20 DIAGNOSIS — R11.2 NON-INTRACTABLE VOMITING WITH NAUSEA, UNSPECIFIED VOMITING TYPE: ICD-10-CM

## 2020-05-20 DIAGNOSIS — J30.2 SEASONAL ALLERGIES: ICD-10-CM

## 2020-05-20 DIAGNOSIS — R21 SKIN RASH: ICD-10-CM

## 2020-05-20 DIAGNOSIS — G43.709 CHRONIC MIGRAINE WITHOUT AURA WITHOUT STATUS MIGRAINOSUS, NOT INTRACTABLE: ICD-10-CM

## 2020-05-20 DIAGNOSIS — G43.709 CHRONIC MIGRAINE WITHOUT AURA WITHOUT STATUS MIGRAINOSUS, NOT INTRACTABLE: Primary | ICD-10-CM

## 2020-05-20 PROBLEM — Z01.419 ENCOUNTER FOR ANNUAL ROUTINE GYNECOLOGICAL EXAMINATION: Status: RESOLVED | Noted: 2019-03-26 | Resolved: 2020-05-20

## 2020-05-20 PROBLEM — R11.10 NON-INTRACTABLE VOMITING: Status: ACTIVE | Noted: 2020-05-20

## 2020-05-20 LAB — SARS-COV-2 RNA SPEC QL NAA+PROBE: NOT DETECTED

## 2020-05-20 PROCEDURE — 99214 OFFICE O/P EST MOD 30 MIN: CPT | Performed by: PHYSICIAN ASSISTANT

## 2020-05-20 PROCEDURE — 3008F BODY MASS INDEX DOCD: CPT | Performed by: PHYSICIAN ASSISTANT

## 2020-05-20 PROCEDURE — 99214 OFFICE O/P EST MOD 30 MIN: CPT | Performed by: FAMILY MEDICINE

## 2020-05-20 RX ORDER — DEXAMETHASONE 2 MG/1
2 TABLET ORAL
Qty: 5 TABLET | Refills: 0 | Status: SHIPPED | OUTPATIENT
Start: 2020-05-20 | End: 2020-05-25

## 2020-05-21 RX ORDER — FREMANEZUMAB-VFRM 225 MG/1.5ML
INJECTION SUBCUTANEOUS
Qty: 1.5 SYRINGE | Refills: 5 | Status: SHIPPED | OUTPATIENT
Start: 2020-05-21 | End: 2020-11-05

## 2020-06-09 DIAGNOSIS — R09.82 PND (POST-NASAL DRIP): ICD-10-CM

## 2020-06-09 RX ORDER — FLUTICASONE PROPIONATE 50 MCG
SPRAY, SUSPENSION (ML) NASAL
Qty: 16 ML | Refills: 5 | Status: SHIPPED | OUTPATIENT
Start: 2020-06-09 | End: 2020-09-27 | Stop reason: SDUPTHER

## 2020-07-06 ENCOUNTER — ANNUAL EXAM (OUTPATIENT)
Dept: OBGYN CLINIC | Facility: MEDICAL CENTER | Age: 55
End: 2020-07-06
Payer: COMMERCIAL

## 2020-07-06 VITALS — BODY MASS INDEX: 29.05 KG/M2 | WEIGHT: 180 LBS

## 2020-07-06 DIAGNOSIS — R10.2 PELVIC PAIN IN FEMALE: ICD-10-CM

## 2020-07-06 DIAGNOSIS — Z01.419 ENCOUNTER FOR GYNECOLOGICAL EXAMINATION (GENERAL) (ROUTINE) WITHOUT ABNORMAL FINDINGS: ICD-10-CM

## 2020-07-06 DIAGNOSIS — Z98.890 HISTORY OF LUMPECTOMY: Primary | ICD-10-CM

## 2020-07-06 PROCEDURE — S0612 ANNUAL GYNECOLOGICAL EXAMINA: HCPCS | Performed by: NURSE PRACTITIONER

## 2020-07-06 PROCEDURE — 3077F SYST BP >= 140 MM HG: CPT | Performed by: NURSE PRACTITIONER

## 2020-07-06 PROCEDURE — 3079F DIAST BP 80-89 MM HG: CPT | Performed by: NURSE PRACTITIONER

## 2020-07-06 NOTE — ASSESSMENT & PLAN NOTE
Declines mammo-states she would agree to bilateral U/S  History of right breast lumpectomy following traumatic breast injury

## 2020-07-06 NOTE — ASSESSMENT & PLAN NOTE
Benign findings on routine gyn exam  Recommended monthly SBE, annual CBE and annual screening mammo  ASCCP guidelines reviewed and pap with cotesting noted to be up to date; this low risk patient was advised she meets criteria to d/c pap screening at age 72  Cologuard in 2019 normal The patient denies STI risk factors and declines testing at this time  Reviewed diet/activity recommendations:  Encouraged daily Ca++ and vitamin D intake as well as daily weight bearing exercise for promotion of bone health    Discussed postmenopausal considerations and symptoms to report  RTO in one year for routine annual gyn exam or sooner PRN

## 2020-07-06 NOTE — PROGRESS NOTES
Encounter for gynecological examination (general) (routine) without abnormal findings    Benign findings on routine gyn exam  Recommended monthly SBE, annual CBE and annual screening mammo  ASCCP guidelines reviewed and pap with cotesting noted to be up to date; this low risk patient was advised she meets criteria to d/c pap screening at age 72  Cologuard in 2019 normal The patient denies STI risk factors and declines testing at this time  Reviewed diet/activity recommendations:  Encouraged daily Ca++ and vitamin D intake as well as daily weight bearing exercise for promotion of bone health    Discussed postmenopausal considerations and symptoms to report  RTO in one year for routine annual gyn exam or sooner PRN  Pelvic pain in female  Pelvic U/S ordered    History of lumpectomy  Declines mammo-states she would agree to bilateral U/S  History of right breast lumpectomy following traumatic breast injury  Diagnoses and all orders for this visit:    History of lumpectomy  -     US breast bilateral limited (diagnostic); Future    Encounter for gynecological examination (general) (routine) without abnormal findings    Pelvic pain in female  -     US pelvis complete w transvaginal; Future         [facility]   1965    CC:  Yearly exam    S:Mary is a  47 y o  female here for yearly exam  She is postmenopausal and has had no vaginal bleeding  She denies abnormal vaginal discharge, itching, odor or dryness  She denies breast concerns, abdominal/pelvic pain or bladder/bowel dysfunction  Denies stress incontinence and significant hot flashes  She had an ablation in 2008 with no bleeding since  She is complaining of intermittent RLQ pain since February  No precipitating or alleviating factors  Not associated with IC  Dull and achy  She refuses to have a mammogram   Agrees to bilateral U/S but aware not as sensitive as mammo  She has a history of right lumpectomy after traumatic breast injury  Sexual activity: She is sexually active without pain, bleeding or dryness  STD testing: She does not want STD testing today  Last Pap: 3/19 neg/neg   Last Mammo: declines   SBE: monthly   Last Colonoscopy: cologuard 2019       Family hx of breast cancer: denies  Family hx of ovarian cancer: denies   Family hx of colon cancer: father       Current Outpatient Medications:     AJOVY 225 MG/1 5ML SOSY, INJECT 1 SYRINGE EVERY MONTH, Disp: 1 5 Syringe, Rfl: 5    Ascorbic Acid (CEDRICK-C PO), Take by mouth, Disp: , Rfl:     aspirin 81 mg chewable tablet, Chew 81 mg daily, Disp: , Rfl:     cetirizine (ZyrTEC) 10 mg tablet, Take 10 mg by mouth, Disp: , Rfl:     Cyanocobalamin (B-12) 5000 MCG SUBL, Place 5,000 mcg under the tongue daily, Disp: , Rfl:     fluticasone (FLONASE) 50 mcg/act nasal spray, SPRAY 1 SPRAY INTO EACH NOSTRIL EVERY DAY, Disp: 16 mL, Rfl: 5    gabapentin (NEURONTIN) 100 mg capsule, 1 CAPSULE IN THE MORNING, 1 CAPSULE AT NOON AND 2 CAPSULES AT NIGHT TIME , Disp: 120 capsule, Rfl: 5    ketorolac (TORADOL) 10 mg tablet, 1 tab q6 hours prn migraine (with compazine if needed)  Max 2 per day and 3 per week , Disp: 10 tablet, Rfl: 2    Magnesium 200 MG TABS, Take 200 mg by mouth 2 (two) times a day, Disp: , Rfl:     meclizine (ANTIVERT) 12 5 MG tablet, 1 tab qhs prn dizziness and up to TID as needed, Disp: 30 tablet, Rfl: 0    metoclopramide (REGLAN) 10 mg tablet, Take 1 tablet (10 mg total) by mouth every 6 (six) hours as needed (migraine/ nausea) Hold compazine  , Disp: 30 tablet, Rfl: 0    MULTIPLE VITAMINS ESSENTIAL PO, Take by mouth, Disp: , Rfl:     nortriptyline (PAMELOR) 10 mg capsule, 2 TABLETS QHS , Disp: 60 capsule, Rfl: 3    venlafaxine (EFFEXOR-XR) 150 mg 24 hr capsule, TAKE 1 CAPSULE BY MOUTH EVERY DAY, Disp: 90 capsule, Rfl: 3  Social History     Socioeconomic History    Marital status: /Civil Union     Spouse name: Not on file    Number of children: Not on file    Years of education: Not on file    Highest education level: Not on file   Occupational History    Not on file   Social Needs    Financial resource strain: Not on file    Food insecurity:     Worry: Not on file     Inability: Not on file    Transportation needs:     Medical: Not on file     Non-medical: Not on file   Tobacco Use    Smoking status: Former Smoker     Last attempt to quit:      Years since quittin 5    Smokeless tobacco: Never Used   Substance and Sexual Activity    Alcohol use:  Yes     Alcohol/week: 7 0 standard drinks     Types: 7 Glasses of wine per week     Frequency: 2-3 times a week     Drinks per session: 1 or 2     Binge frequency: Never     Comment: social per [employer]    Drug use: No     Comment: Uses CBD oil Daily    Sexual activity: Yes     Partners: Male   Lifestyle    Physical activity:     Days per week: Not on file     Minutes per session: Not on file    Stress: Not on file   Relationships    Social connections:     Talks on phone: Not on file     Gets together: Not on file     Attends Hindu service: Not on file     Active member of club or organization: Not on file     Attends meetings of clubs or organizations: Not on file     Relationship status: Not on file    Intimate partner violence:     Fear of current or ex partner: Not on file     Emotionally abused: Not on file     Physically abused: Not on file     Forced sexual activity: Not on file   Other Topics Concern    Not on file   Social History Narrative    Caffeine use    Exercises 6 times a week     Family History   Problem Relation Age of Onset    Heart failure Mother         CHF    Heart attack Father     Colon cancer Father     Hypertension Brother     Stroke Brother     Diabetes Maternal Aunt     Stroke Maternal Aunt     Arthritis Family     Heart disease Sister      Past Medical History:   Diagnosis Date    Abnormal Pap smear of cervix     all normal since    Asthma     Carbon monoxide exposure 2/6/2018    Chronic back pain     Lump of skin     last assessed 11/21/13    Migraine     Sinus bradycardia     last assessed 10/25/16    Varicella         Review of Systems   Constitutional: Negative for appetite change, fatigue and unexpected weight change  Respiratory: Negative for shortness of breath  Cardiovascular: Negative for chest pain and leg swelling  Gastrointestinal: Negative for abdominal pain  Endocrine: Negative for cold intolerance and heat intolerance  Breasts:  Negative for breast tenderness or masses  Genitourinary: Negative for dyspareunia, dysuria, flank pain, frequency, genital sores, hematuria and pelvic pain  Negative for stress incontinence  Musculoskeletal: Negative for back pain  Neurological: Negative for headaches  O:  Weight 81 6 kg (180 lb), not currently breastfeeding  Patient appears well and is not in distress  Neck is supple without masses  Normal thyroid  Heart regular rate and rhythm  Lungs CTA bilaterally   Breasts are symmetrical without mass, tenderness, nipple discharge, skin changes or adenopathy  Rash under breasts bilaterally-using topical cream with improvement   Abdomen is soft and nontender without masses  External genitals are normal without lesions or rashes  Urethral meatus and urethra are normal  Bladder is normal to palpation  Vagina is normal without discharge or bleeding  Cervix is normal without discharge or lesion  Uterus is normal, mobile, nontender without palpable mass  Adnexa are normal without palpable mass     Mild tenderness noted on deep palpation of RLQ   Skin warm and dry   Capillary refill < 2 seconds  Alert and oriented x 3 with normal affect

## 2020-07-07 ENCOUNTER — APPOINTMENT (OUTPATIENT)
Dept: LAB | Facility: MEDICAL CENTER | Age: 55
End: 2020-07-07
Payer: COMMERCIAL

## 2020-07-07 DIAGNOSIS — F32.A ANXIETY AND DEPRESSION: ICD-10-CM

## 2020-07-07 DIAGNOSIS — F41.9 ANXIETY AND DEPRESSION: ICD-10-CM

## 2020-07-07 DIAGNOSIS — M13.0 POLYARTHRITIS OF MULTIPLE SITES: ICD-10-CM

## 2020-07-07 DIAGNOSIS — E78.00 HYPERCHOLESTEROLEMIA: ICD-10-CM

## 2020-07-07 DIAGNOSIS — Z00.00 ROUTINE GENERAL MEDICAL EXAMINATION AT A HEALTH CARE FACILITY: ICD-10-CM

## 2020-07-07 LAB
ALBUMIN SERPL BCP-MCNC: 4 G/DL (ref 3.5–5)
ALP SERPL-CCNC: 77 U/L (ref 46–116)
ALT SERPL W P-5'-P-CCNC: 26 U/L (ref 12–78)
ANION GAP SERPL CALCULATED.3IONS-SCNC: 5 MMOL/L (ref 4–13)
AST SERPL W P-5'-P-CCNC: 19 U/L (ref 5–45)
BASOPHILS # BLD AUTO: 0.06 THOUSANDS/ΜL (ref 0–0.1)
BASOPHILS NFR BLD AUTO: 1 % (ref 0–1)
BILIRUB SERPL-MCNC: 0.41 MG/DL (ref 0.2–1)
BUN SERPL-MCNC: 18 MG/DL (ref 5–25)
CALCIUM SERPL-MCNC: 8.8 MG/DL (ref 8.3–10.1)
CHLORIDE SERPL-SCNC: 105 MMOL/L (ref 100–108)
CHOLEST SERPL-MCNC: 264 MG/DL (ref 50–200)
CO2 SERPL-SCNC: 29 MMOL/L (ref 21–32)
CREAT SERPL-MCNC: 0.87 MG/DL (ref 0.6–1.3)
CRP SERPL QL: <3 MG/L
EOSINOPHIL # BLD AUTO: 0.16 THOUSAND/ΜL (ref 0–0.61)
EOSINOPHIL NFR BLD AUTO: 3 % (ref 0–6)
ERYTHROCYTE [DISTWIDTH] IN BLOOD BY AUTOMATED COUNT: 13 % (ref 11.6–15.1)
GFR SERPL CREATININE-BSD FRML MDRD: 76 ML/MIN/1.73SQ M
GLUCOSE P FAST SERPL-MCNC: 104 MG/DL (ref 65–99)
HCT VFR BLD AUTO: 40.9 % (ref 34.8–46.1)
HDLC SERPL-MCNC: 94 MG/DL
HGB BLD-MCNC: 13.1 G/DL (ref 11.5–15.4)
IMM GRANULOCYTES # BLD AUTO: 0.02 THOUSAND/UL (ref 0–0.2)
IMM GRANULOCYTES NFR BLD AUTO: 0 % (ref 0–2)
LDLC SERPL CALC-MCNC: 141 MG/DL (ref 0–100)
LYMPHOCYTES # BLD AUTO: 2.31 THOUSANDS/ΜL (ref 0.6–4.47)
LYMPHOCYTES NFR BLD AUTO: 40 % (ref 14–44)
MCH RBC QN AUTO: 31 PG (ref 26.8–34.3)
MCHC RBC AUTO-ENTMCNC: 32 G/DL (ref 31.4–37.4)
MCV RBC AUTO: 97 FL (ref 82–98)
MONOCYTES # BLD AUTO: 0.36 THOUSAND/ΜL (ref 0.17–1.22)
MONOCYTES NFR BLD AUTO: 6 % (ref 4–12)
NEUTROPHILS # BLD AUTO: 2.87 THOUSANDS/ΜL (ref 1.85–7.62)
NEUTS SEG NFR BLD AUTO: 50 % (ref 43–75)
NRBC BLD AUTO-RTO: 0 /100 WBCS
PLATELET # BLD AUTO: 237 THOUSANDS/UL (ref 149–390)
PMV BLD AUTO: 10.5 FL (ref 8.9–12.7)
POTASSIUM SERPL-SCNC: 3.9 MMOL/L (ref 3.5–5.3)
PROT SERPL-MCNC: 7.2 G/DL (ref 6.4–8.2)
RBC # BLD AUTO: 4.23 MILLION/UL (ref 3.81–5.12)
SODIUM SERPL-SCNC: 139 MMOL/L (ref 136–145)
T4 FREE SERPL-MCNC: 0.81 NG/DL (ref 0.76–1.46)
TRIGL SERPL-MCNC: 147 MG/DL
TSH SERPL DL<=0.05 MIU/L-ACNC: 5.03 UIU/ML (ref 0.36–3.74)
WBC # BLD AUTO: 5.78 THOUSAND/UL (ref 4.31–10.16)

## 2020-07-07 PROCEDURE — 85025 COMPLETE CBC W/AUTO DIFF WBC: CPT

## 2020-07-07 PROCEDURE — 86430 RHEUMATOID FACTOR TEST QUAL: CPT

## 2020-07-07 PROCEDURE — 86140 C-REACTIVE PROTEIN: CPT

## 2020-07-07 PROCEDURE — 86038 ANTINUCLEAR ANTIBODIES: CPT

## 2020-07-07 PROCEDURE — 80053 COMPREHEN METABOLIC PANEL: CPT

## 2020-07-07 PROCEDURE — 84443 ASSAY THYROID STIM HORMONE: CPT

## 2020-07-07 PROCEDURE — 80061 LIPID PANEL: CPT

## 2020-07-07 PROCEDURE — 84439 ASSAY OF FREE THYROXINE: CPT

## 2020-07-07 PROCEDURE — 36415 COLL VENOUS BLD VENIPUNCTURE: CPT

## 2020-07-08 DIAGNOSIS — R79.89 ELEVATED TSH: ICD-10-CM

## 2020-07-08 DIAGNOSIS — I10 ESSENTIAL HYPERTENSION: Primary | ICD-10-CM

## 2020-07-08 LAB
RHEUMATOID FACT SER QL LA: NEGATIVE
RYE IGE QN: NEGATIVE

## 2020-07-08 RX ORDER — LOSARTAN POTASSIUM 25 MG/1
25 TABLET ORAL DAILY
Qty: 90 TABLET | Refills: 1 | Status: SHIPPED | OUTPATIENT
Start: 2020-07-08 | End: 2020-07-10 | Stop reason: SDUPTHER

## 2020-07-10 ENCOUNTER — TELEPHONE (OUTPATIENT)
Dept: FAMILY MEDICINE CLINIC | Facility: CLINIC | Age: 55
End: 2020-07-10

## 2020-07-10 DIAGNOSIS — I10 ESSENTIAL HYPERTENSION: ICD-10-CM

## 2020-07-10 RX ORDER — LOSARTAN POTASSIUM 50 MG/1
TABLET ORAL
Qty: 90 TABLET | Refills: 1 | Status: SHIPPED | OUTPATIENT
Start: 2020-07-10 | End: 2020-07-13 | Stop reason: SDUPTHER

## 2020-07-10 RX ORDER — LOSARTAN POTASSIUM 25 MG/1
25 TABLET ORAL DAILY
Qty: 90 TABLET | Refills: 1 | Status: SHIPPED | OUTPATIENT
Start: 2020-07-10 | End: 2020-07-10 | Stop reason: SDUPTHER

## 2020-07-10 NOTE — TELEPHONE ENCOUNTER
Patient called stating that she has not been able to  her medication at the pharmacy  She states Losartan 25mg is on back order  I called the pharmacy myself and pharmacist stated that it has been coming in sporadically over the last 6 months  He states we can issue 50 mg and patient can take 1/2 a tablet daily  Please advise

## 2020-07-13 DIAGNOSIS — I10 ESSENTIAL HYPERTENSION: ICD-10-CM

## 2020-07-13 RX ORDER — LOSARTAN POTASSIUM 50 MG/1
TABLET ORAL
Qty: 90 TABLET | Refills: 1 | Status: SHIPPED | OUTPATIENT
Start: 2020-07-13 | End: 2021-07-12

## 2020-07-14 DIAGNOSIS — I10 ESSENTIAL HYPERTENSION: ICD-10-CM

## 2020-07-14 RX ORDER — LOSARTAN POTASSIUM 50 MG/1
TABLET ORAL
Qty: 90 TABLET | Refills: 1 | OUTPATIENT
Start: 2020-07-14

## 2020-07-18 DIAGNOSIS — G43.109 VERTIGINOUS MIGRAINE: ICD-10-CM

## 2020-07-18 RX ORDER — GABAPENTIN 100 MG/1
CAPSULE ORAL
Qty: 120 CAPSULE | Refills: 5 | Status: SHIPPED | OUTPATIENT
Start: 2020-07-18 | End: 2021-02-07

## 2020-07-24 ENCOUNTER — PROCEDURE VISIT (OUTPATIENT)
Dept: FAMILY MEDICINE CLINIC | Facility: CLINIC | Age: 55
End: 2020-07-24
Payer: COMMERCIAL

## 2020-07-24 VITALS
BODY MASS INDEX: 28.89 KG/M2 | HEART RATE: 62 BPM | RESPIRATION RATE: 16 BRPM | TEMPERATURE: 97.5 F | SYSTOLIC BLOOD PRESSURE: 128 MMHG | WEIGHT: 179 LBS | DIASTOLIC BLOOD PRESSURE: 80 MMHG

## 2020-07-24 DIAGNOSIS — M65.331 TRIGGER MIDDLE FINGER OF RIGHT HAND: Primary | ICD-10-CM

## 2020-07-24 PROCEDURE — 20550 NJX 1 TENDON SHEATH/LIGAMENT: CPT | Performed by: FAMILY MEDICINE

## 2020-07-24 RX ORDER — METHYLPREDNISOLONE ACETATE 80 MG/ML
40 INJECTION, SUSPENSION INTRA-ARTICULAR; INTRALESIONAL; INTRAMUSCULAR; SOFT TISSUE ONCE
Status: COMPLETED | OUTPATIENT
Start: 2020-07-24 | End: 2020-07-24

## 2020-07-24 RX ADMIN — METHYLPREDNISOLONE ACETATE 40 MG: 80 INJECTION, SUSPENSION INTRA-ARTICULAR; INTRALESIONAL; INTRAMUSCULAR; SOFT TISSUE at 17:13

## 2020-07-24 NOTE — PROGRESS NOTES
Injection tendon sheath ligament single     Date/Time 7/24/2020 4:45 PM     Performed by  Millie Evans MD     Authorized by Millie Evans MD      Universal Protocol Consent: Verbal consent obtained  Site preparation: Isopropyl alcohol    Local anesthesia used: no     Anesthesia   Local anesthesia used: no     Procedure Details   Procedure Notes: Trigger finger right hand middle finger  Using aseptic technique a tendon sheath injection was performed on palmar surface of right hand at the base of right middle finger DepoMedrol 80 mg/ML-  0 5 ML  Patient tolerance: Patient tolerated the procedure well with no immediate complications

## 2020-08-05 ENCOUNTER — HOSPITAL ENCOUNTER (OUTPATIENT)
Dept: RADIOLOGY | Facility: MEDICAL CENTER | Age: 55
Discharge: HOME | End: 2020-08-05
Payer: COMMERCIAL

## 2020-08-05 DIAGNOSIS — R10.2 PELVIC PAIN IN FEMALE: ICD-10-CM

## 2020-08-05 PROCEDURE — 76856 US EXAM PELVIC COMPLETE: CPT

## 2020-08-05 PROCEDURE — 76830 TRANSVAGINAL US NON-OB: CPT

## 2020-08-17 ENCOUNTER — TELEPHONE (OUTPATIENT)
Dept: OBGYN CLINIC | Facility: CLINIC | Age: 55
End: 2020-08-17

## 2020-08-17 ENCOUNTER — TELEPHONE (OUTPATIENT)
Dept: OBGYN CLINIC | Facility: MEDICAL CENTER | Age: 55
End: 2020-08-17

## 2020-08-17 NOTE — TELEPHONE ENCOUNTER
Aware of U/S findings  PM  Advised F/u with MD to discuss findings and determine if MRI is needed vs other testing

## 2020-08-17 NOTE — TELEPHONE ENCOUNTER
----- Message from Jojo Comes sent at 8/17/2020 12:18 AM EDT -----  Regarding: Test Results Question  Contact: [phone]  I read my report and would like to follow up with an MRI as stated in the report so maybe we can possibly find what is causing this pain in my lower abdomen   I also saw there's calcification of something and it's not tiny by any means

## 2020-08-19 ENCOUNTER — TELEPHONE (OUTPATIENT)
Dept: NEUROLOGY | Facility: CLINIC | Age: 55
End: 2020-08-19

## 2020-08-19 NOTE — TELEPHONE ENCOUNTER
Confirmed 8/20/2020 3PM [address] at [facility]  Registration completed  Covid-screening questions completed  Patient is coming alone to the office visit  Aware of all policies - mask, visitor, temperature and no show fee

## 2020-08-20 ENCOUNTER — OFFICE VISIT (OUTPATIENT)
Dept: NEUROLOGY | Facility: CLINIC | Age: 55
End: 2020-08-20
Payer: COMMERCIAL

## 2020-08-20 VITALS
SYSTOLIC BLOOD PRESSURE: 130 MMHG | WEIGHT: 177.6 LBS | HEART RATE: 66 BPM | BODY MASS INDEX: 28.67 KG/M2 | TEMPERATURE: 98.1 F | DIASTOLIC BLOOD PRESSURE: 75 MMHG

## 2020-08-20 DIAGNOSIS — Z77.29 CARBON MONOXIDE EXPOSURE: ICD-10-CM

## 2020-08-20 DIAGNOSIS — G43.709 CHRONIC MIGRAINE WITHOUT AURA WITHOUT STATUS MIGRAINOSUS, NOT INTRACTABLE: Primary | ICD-10-CM

## 2020-08-20 DIAGNOSIS — G43.109 VERTIGINOUS MIGRAINE: ICD-10-CM

## 2020-08-20 PROCEDURE — 99214 OFFICE O/P EST MOD 30 MIN: CPT | Performed by: PHYSICIAN ASSISTANT

## 2020-08-20 PROCEDURE — 3075F SYST BP GE 130 - 139MM HG: CPT | Performed by: PHYSICIAN ASSISTANT

## 2020-08-20 PROCEDURE — 1036F TOBACCO NON-USER: CPT | Performed by: PHYSICIAN ASSISTANT

## 2020-08-20 PROCEDURE — 3078F DIAST BP <80 MM HG: CPT | Performed by: PHYSICIAN ASSISTANT

## 2020-08-20 RX ORDER — TOPIRAMATE 25 MG/1
TABLET ORAL
Qty: 60 TABLET | Refills: 2 | Status: SHIPPED | OUTPATIENT
Start: 2020-08-20 | End: 2020-11-09

## 2020-08-20 NOTE — PROGRESS NOTES
Patient ID: Nicko Way is a 47 y o  female  Assessment/Plan:   Diagnoses and all orders for this visit:    Chronic migraine without aura without status migrainosus, not intractable  -     topiramate (TOPAMAX) 25 mg tablet; 1 tab qhs x 5 days, then 2 tabs qhs    Vertiginous migraine    Carbon monoxide exposure    Other orders  -     fluocinonide (LIDEX) 0 05 % cream         Migraines are stable to improved with Ajovy  Denies s/e  Vertigo with migraines is better controlled  Plan:  Continue 1 5 mL Ajovy syringe subcu monthly  Continue effexor 150 mg qAM   Continue 20 mg pamelor qhs  Continue gabapentin: 100 mg AM/ 100 mg noon/ 200 mg HS  Could increase if needed- she declines this today  Add topamax today (asked about long dose for weight loss and headache prevention)  Continue magnesium 200 mg BID  PRN migraine: Toradol  Instead of compazine, reglan to use in case of n/v or is nausea itself gets worse  Add meclizine for vertigo  She was instructed to keep track of her symptoms as noted below described as weird sensation in the head    Cannot entirely rule out seizure activity  Would suggest an EEG but we both agreed that the symptoms were too infrequent at this time  She will let me know if these symptoms recur after keeping a detailed journal   They also seem to be associated with her migraines per her history  The patient should not hesitate to call me prior to her follow up with any questions or concerns  The patient was instructed to urgently call 911 or present to the nearest emergency room with any new or worsening neurological deficits  This note was sent to Dr Kisha Norris to co-sign in Dr Dimple Cedillo absence  I have spent 30 minutes with Patient  today in which greater than 50% of this time was spent in counseling/coordination of care regarding Impression, plan and med side effects  Subjective:    HPI    Since last seen she started Ajovy and denies side effects    She uses the manual syringe  She had a rash with Emgality which has since improved  Rakesh Meuse worked better however cause problematic constipation as a side effect  Since starting Ajovy she reports at least a 50 percent reduction in migraine headaches  She is happy with this medication  She still has frequent low-grade headaches  She reports a new symptom of a weird sensation in the head and then blanks out for a second followed by jerking of the entire body  She had this symptom when she was standing doing dishes, or other tasks  They are not frequent symptoms, but she wanted to talk about because it happened at least once or twice  She states her head feels funny and then she feels that she has blanking out  Typically associated with a migraine headache, because 24 hours after the blanking out symptoms she develops a headache  Prior documentation:  She patient had episode of severe migraine with diarrhea and emesis on Sunday, and she usually does not vomit with migraine  States she does usually get nausea but not commonly vomiting  Also reports sweating all night  She has been working harder at work as , and states does not get compensated for it, no breaks usually  States she awoke early morning (overnight) Sunday with a headache- headache woke her up, but she thought this was just from her seasonal allergies so she fell back asleep, and then in the AM woke up and headache was very severe  And since then her stomach has been very nauseous, no vomiting since though thankfully  She di dnot yet try reglan  She was prescribed steroid decadron by GP but it is not yet on stock at her [facility] so she is waiting for it  She also continues to have a very nagging headache, not as severe as Sunday but continuing   Typically her migraine only lasts 1-2 days, but this so far is lasting 4 days      She thinks recent migraine is triggered by stress at work, and she also has very bad allergies      With the migraine above she also had lightheadedness, generalized weakness      She reports an itchy red rash on the back of her hands which seems to start with the emgality injection and then the rash wears off over the next few weeks, and returns with the next injection  There are a couple red dots on the back of the hands b/l, and sometimes raised and also describes them as "welts" at times  Denies SOB, dysphagia, hives/ itching anywhere else  Prednisone PO did not help  Benadryl does not help- she takes 25 mg [id number] her seasonal allergies are bad  Topical benadryl does help      HA/ migraine triggers- work (states they may cut her pay, and she is working harder/ longer without compensation), and ?seasonal allergies    Migraines "auras" involve an episode of headache with dizziness and very sensitivity to high-pitched sounds, screeching, even her dogs barking   When she gets this symptom she develops confusion and disorientation   It lasts several hours and Toradol plus Reglan (or Compazine) cocktail helps  -- fewer auras/ dizziness episodes and fewer migraines since [facility] [employer] but states she wakes up frequently in the middle the night      States some irritability at work when her coworkers annoy her or bother her  She likes to work alone  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  This route is 5 hours and she has difficulty stopping to go the bathroom so she does not drink a lot of water at this time; otherwise she drinks a lot of water during the day      As noted at the last visit, migraine frequency is about once a week and much milder  States every 4-5 weeks does have acephalgic migraine      Does have HLD and states is eating less cheese now to help with high cholesterol  Takes 81 mg asa daily      Continues 100 mg magnesium BID   Also takes B12 and vit C as noted on med list      When she awakens in the middle the night with vertigo she notes going to be a bad day the next day   She knows she is going to have a lot headaches then  Kelle Pelletier also reports associated pressure diffusely in her head, noise sensitivity more than light sensitivity   Her headache is located in the back of her head left greater than right and radiates around the ears, and sometimes into the frontal regions   It feels like she is being hit in the back of her head with a bat L>R      In the past she weaned and stopped Depakote due to sedation  Verapamil gave her heart palpitations thus stopped this as recommended by us prior to her last visit here      For vertigo spells-- States in the past an ER doctor showed her vestibular exercises to do and this seems to help a bit      Repeat imaging reviewed and no new findings  The following portions of the patient's history were reviewed and updated as appropriate:   She  has a past medical history of Abnormal Pap smear of cervix (1989), Asthma, Carbon monoxide exposure (2/6/2018), Chronic back pain, Lump of skin, Migraine, Sinus bradycardia, and Varicella  She   Patient Active Problem List    Diagnosis Date Noted    Carbon monoxide exposure 08/24/2020    Encounter for gynecological examination (general) (routine) without abnormal findings 07/06/2020    Pelvic pain in female 07/06/2020    History of lumpectomy 07/06/2020    Non-intractable vomiting 05/20/2020    Seasonal allergies 05/20/2020    Skin rash 05/20/2020    Polyarthritis of multiple sites 04/17/2020    Phonophobia 02/14/2020    Vertiginous migraine 04/22/2019    Overweight (BMI 25 0-29 9) 03/01/2019    Anxiety and depression 03/01/2019    Varicose veins of both lower extremities with pain 03/01/2019    PND (post-nasal drip) 03/01/2019    Chronic migraine without aura without status migrainosus, not intractable 10/08/2018    Vertigo 02/06/2018    Hypercholesterolemia 10/22/2014     She  has a past surgical history that includes Appendectomy;  Tonsillectomy; [place] tooth extraction (Bilateral); Breast lumpectomy; Colonoscopy; and Varicose vein surgery  Her family history includes Arthritis in her family; Colon cancer in her father; Diabetes in her maternal aunt; Heart attack in her father; Heart disease in her sister; Heart failure in her mother; Hypertension in her brother; Stroke in her brother and maternal aunt  She  reports that she quit smoking about 22 years ago  She has never used smokeless tobacco  She reports current alcohol use of about 7 0 standard drinks of alcohol per week  She reports that she does not use drugs  Current Outpatient Medications   Medication Sig Dispense Refill    AJOVY 225 MG/1 5ML SOSY INJECT 1 SYRINGE EVERY MONTH 1 5 Syringe 5    Ascorbic Acid (CEDRICK-C PO) Take by mouth      aspirin 81 mg chewable tablet Chew 81 mg daily      cetirizine (ZyrTEC) 10 mg tablet Take 10 mg by mouth      Cyanocobalamin (B-12) 5000 MCG SUBL Place 5,000 mcg under the tongue daily      fluocinonide (LIDEX) 0 05 % cream       fluticasone (FLONASE) 50 mcg/act nasal spray SPRAY 1 SPRAY INTO EACH NOSTRIL EVERY DAY 16 mL 5    gabapentin (NEURONTIN) 100 mg capsule TAKE 1 CAPSULE BY MOUTH IN THE MORNING, 1 CAPSULE AT NOON AND 2 CAPSULES AT NIGHT TIME  120 capsule 5    ketorolac (TORADOL) 10 mg tablet 1 tab q6 hours prn migraine (with compazine if needed)  Max 2 per day and 3 per week  10 tablet 2    losartan (COZAAR) 50 mg tablet Take 0 5 tab PO daily 90 tablet 1    Magnesium 200 MG TABS Take 200 mg by mouth 2 (two) times a day      meclizine (ANTIVERT) 12 5 MG tablet 1 tab qhs prn dizziness and up to TID as needed 30 tablet 0    metoclopramide (REGLAN) 10 mg tablet Take 1 tablet (10 mg total) by mouth every 6 (six) hours as needed (migraine/ nausea) Hold compazine   30 tablet 0    MULTIPLE VITAMINS ESSENTIAL PO Take by mouth      venlafaxine (EFFEXOR-XR) 150 mg 24 hr capsule TAKE 1 CAPSULE BY MOUTH EVERY DAY 90 capsule 3    nortriptyline (PAMELOR) 10 mg capsule TAKE 2 TABLETS BY MOUTH AT BEDTIME 60 capsule 5    topiramate (TOPAMAX) 25 mg tablet 1 tab qhs x 5 days, then 2 tabs qhs 60 tablet 2     No current facility-administered medications for this visit  She is allergic to penicillins; doxycycline; erythromycin; and other            Objective:    Blood pressure 130/75, pulse 66, temperature 98 1 °F (36 7 °C), weight 80 6 kg (177 lb 9 6 oz), not currently breastfeeding  Physical Exam    Neurological Exam  On neurologic exam, the patient is alert and oriented to time and place  Speech is fluent and articulate, and the patient follows commands appropriately  Judgment and affect appear normal  Pupils are equally round and reactive to light and extraocular muscles are intact without nystagmus  Face is symmetric, and tongue, uvula, and palate are midline  Facial sensation is normal and symmetric, in all 3 divisions of the trigeminal nerve  Hearing is intact  Motor examination reveals intact strength throughout  Normal gait is steady  ROS:    Review of Systems   Constitutional: Negative  Negative for appetite change and fever  HENT: Negative  Negative for hearing loss, tinnitus, trouble swallowing and voice change  Eyes: Negative  Negative for photophobia and pain  Respiratory: Negative  Negative for shortness of breath  Cardiovascular: Negative  Negative for palpitations  Gastrointestinal: Negative  Negative for nausea and vomiting  Endocrine: Negative  Negative for cold intolerance  Genitourinary: Negative  Negative for dysuria, frequency and urgency  Musculoskeletal: Negative  Negative for myalgias and neck pain  Skin: Negative  Negative for rash  Neurological: Positive for dizziness and headaches  Negative for tremors, seizures, syncope, facial asymmetry, speech difficulty, weakness, light-headedness and numbness  Hematological: Negative  Does not bruise/bleed easily  Psychiatric/Behavioral: Negative    Negative for confusion, hallucinations and sleep disturbance  The following portions of the patient's history were reviewed and updated as appropriate: allergies, current medications/ medication history, past family history, past medical history, past social history, past surgical history and problem list     Review of systems was reviewed and otherwise unremarkable from a neurological perspective

## 2020-08-22 DIAGNOSIS — G43.709 CHRONIC MIGRAINE WITHOUT AURA WITHOUT STATUS MIGRAINOSUS, NOT INTRACTABLE: ICD-10-CM

## 2020-08-23 RX ORDER — NORTRIPTYLINE HYDROCHLORIDE 10 MG/1
CAPSULE ORAL
Qty: 60 CAPSULE | Refills: 5 | Status: SHIPPED | OUTPATIENT
Start: 2020-08-23 | End: 2021-02-07

## 2020-08-24 PROBLEM — Z77.29 CARBON MONOXIDE EXPOSURE: Status: ACTIVE | Noted: 2020-08-24

## 2020-08-29 ENCOUNTER — APPOINTMENT (OUTPATIENT)
Dept: LAB | Facility: MEDICAL CENTER | Age: 55
End: 2020-08-29
Payer: COMMERCIAL

## 2020-08-29 DIAGNOSIS — I10 ESSENTIAL HYPERTENSION: ICD-10-CM

## 2020-08-29 DIAGNOSIS — R79.89 ELEVATED TSH: ICD-10-CM

## 2020-08-29 LAB
ANION GAP SERPL CALCULATED.3IONS-SCNC: 5 MMOL/L (ref 4–13)
BUN SERPL-MCNC: 15 MG/DL (ref 5–25)
CALCIUM SERPL-MCNC: 9 MG/DL (ref 8.3–10.1)
CHLORIDE SERPL-SCNC: 111 MMOL/L (ref 100–108)
CO2 SERPL-SCNC: 26 MMOL/L (ref 21–32)
CREAT SERPL-MCNC: 0.95 MG/DL (ref 0.6–1.3)
GFR SERPL CREATININE-BSD FRML MDRD: 68 ML/MIN/1.73SQ M
GLUCOSE P FAST SERPL-MCNC: 91 MG/DL (ref 65–99)
POTASSIUM SERPL-SCNC: 4.4 MMOL/L (ref 3.5–5.3)
SODIUM SERPL-SCNC: 142 MMOL/L (ref 136–145)
TSH SERPL DL<=0.05 MIU/L-ACNC: 2.15 UIU/ML (ref 0.36–3.74)

## 2020-08-29 PROCEDURE — 80048 BASIC METABOLIC PNL TOTAL CA: CPT

## 2020-08-29 PROCEDURE — 84443 ASSAY THYROID STIM HORMONE: CPT

## 2020-08-29 PROCEDURE — 36415 COLL VENOUS BLD VENIPUNCTURE: CPT

## 2020-09-01 DIAGNOSIS — E66.3 OVERWEIGHT: Primary | ICD-10-CM

## 2020-09-27 DIAGNOSIS — R09.82 PND (POST-NASAL DRIP): ICD-10-CM

## 2020-09-28 ENCOUNTER — TELEPHONE (OUTPATIENT)
Dept: FAMILY MEDICINE CLINIC | Facility: CLINIC | Age: 55
End: 2020-09-28

## 2020-09-28 DIAGNOSIS — J30.89 ENVIRONMENTAL AND SEASONAL ALLERGIES: Primary | ICD-10-CM

## 2020-09-28 RX ORDER — FLUTICASONE PROPIONATE 50 MCG
2 SPRAY, SUSPENSION (ML) NASAL DAILY
Qty: 16 ML | Refills: 1 | Status: SHIPPED | OUTPATIENT
Start: 2020-09-28 | End: 2020-12-21 | Stop reason: SDUPTHER

## 2020-09-28 RX ORDER — CETIRIZINE HYDROCHLORIDE 10 MG/1
10 TABLET ORAL DAILY
Qty: 90 TABLET | Refills: 1 | Status: SHIPPED | OUTPATIENT
Start: 2020-09-28 | End: 2021-03-22 | Stop reason: SDUPTHER

## 2020-09-28 NOTE — TELEPHONE ENCOUNTER
----- Message from [facility]  Lissa Smiley MD sent at 9/28/2020  9:49 AM EDT -----  Regarding: FW: Test Results Question  Contact: [phone]  I approved the Flonase, wouldn't recommend Zyrtec D on a regular basis, only good for a short course  For a daily medication, would recommend regular Zyrtec only and can send in a prescription for that if she would like   ----- Message -----  From: Nery Johnson  Sent: 9/28/2020   9:27 AM EDT  To: [facility]  Lissa Smiley MD  Subject: Cindy Ro: Test Results Question                          ----- Message -----  From: Hayley Plummer  Sent: 9/27/2020   2:03 PM EDT  To: [address] [place] [address] Clinical  Subject: RE: Test Results Question                        I take Zyrtec D and the pharmacy never had it anymore  Is there any way I can get it via prescription? It's the only one that send to work for me without causing some sort of side effect  I'm also out of my prescription for Flonase that Dr Lissa Smiley had prescribed too me  It's cheaper through my insurance    ----- Message -----  From: Carolee Tinsley PA-C  Sent: 8/17/20, 8:07 AM  To: Hayley Plummer  Subject: RE: Test Results Question    There is a lab order for TSH and BMP that was placed on 8/8/2020  If you go to the [facility] they will be able to locate it and draw all that is needed without any forms or paperwork  We can mail then to you if you would like a paper script but it is otherwise not needed  ----- Message -----       From:Penny J Clossey       Sent:8/17/2020 12:22 AM EDT         To:Huan Smiley MD    Subject:Test Results Question    Dr Carrie Blanca told me I was to follow up with another blood test due to my thyroid coming back at such a high level along with my sugar levels, cholesterol and my blood pressure is suddenly high as well  I've been also gaining a significant amount of weight at which Dr Carrie Blanca stated my thyroid could be the factor of all these things being out of whack   But he never sent the lab orders for me to be rechecked and it's been well over a month

## 2020-10-01 ENCOUNTER — TELEPHONE (OUTPATIENT)
Dept: NEUROLOGY | Facility: CLINIC | Age: 55
End: 2020-10-01

## 2020-10-01 DIAGNOSIS — G43.709 CHRONIC MIGRAINE WITHOUT AURA WITHOUT STATUS MIGRAINOSUS, NOT INTRACTABLE: ICD-10-CM

## 2020-10-01 RX ORDER — PROCHLORPERAZINE MALEATE 10 MG
10 TABLET ORAL EVERY 6 HOURS PRN
Qty: 20 TABLET | Refills: 0 | Status: SHIPPED | OUTPATIENT
Start: 2020-10-01 | End: 2021-09-09 | Stop reason: SDUPTHER

## 2020-10-01 RX ORDER — KETOROLAC TROMETHAMINE 10 MG/1
TABLET, FILM COATED ORAL
Qty: 10 TABLET | Refills: 0 | Status: SHIPPED | OUTPATIENT
Start: 2020-10-01 | End: 2021-09-09 | Stop reason: SDUPTHER

## 2020-10-09 ENCOUNTER — HOSPITAL ENCOUNTER (OUTPATIENT)
Dept: RADIOLOGY | Facility: MEDICAL CENTER | Age: 55
Discharge: HOME | End: 2020-10-09
Payer: COMMERCIAL

## 2020-10-09 VITALS — HEIGHT: 66 IN | BODY MASS INDEX: 28.45 KG/M2 | WEIGHT: 177 LBS

## 2020-10-09 DIAGNOSIS — Z12.39 BREAST CANCER SCREENING: ICD-10-CM

## 2020-10-09 DIAGNOSIS — Z12.31 VISIT FOR SCREENING MAMMOGRAM: ICD-10-CM

## 2020-10-09 PROCEDURE — 77063 BREAST TOMOSYNTHESIS BI: CPT

## 2020-10-09 PROCEDURE — 77067 SCR MAMMO BI INCL CAD: CPT

## 2020-10-20 ENCOUNTER — HOSPITAL ENCOUNTER (OUTPATIENT)
Dept: ULTRASOUND IMAGING | Facility: CLINIC | Age: 55
Discharge: HOME | End: 2020-10-20
Payer: COMMERCIAL

## 2020-10-20 ENCOUNTER — HOSPITAL ENCOUNTER (OUTPATIENT)
Dept: MAMMOGRAPHY | Facility: CLINIC | Age: 55
Discharge: HOME | End: 2020-10-20
Payer: COMMERCIAL

## 2020-10-20 VITALS — HEIGHT: 66 IN | BODY MASS INDEX: 28.45 KG/M2 | WEIGHT: 177 LBS | TEMPERATURE: 97.6 F

## 2020-10-20 DIAGNOSIS — R92.8 ABNORMAL MAMMOGRAM: ICD-10-CM

## 2020-10-20 PROCEDURE — 77065 DX MAMMO INCL CAD UNI: CPT

## 2020-10-20 PROCEDURE — 76642 ULTRASOUND BREAST LIMITED: CPT

## 2020-10-20 PROCEDURE — G0279 TOMOSYNTHESIS, MAMMO: HCPCS

## 2020-11-05 DIAGNOSIS — G43.709 CHRONIC MIGRAINE WITHOUT AURA WITHOUT STATUS MIGRAINOSUS, NOT INTRACTABLE: ICD-10-CM

## 2020-11-05 RX ORDER — FREMANEZUMAB-VFRM 225 MG/1.5ML
INJECTION SUBCUTANEOUS
Qty: 1.5 SYRINGE | Refills: 11 | Status: SHIPPED | OUTPATIENT
Start: 2020-11-05 | End: 2020-11-29

## 2020-11-09 DIAGNOSIS — G43.709 CHRONIC MIGRAINE WITHOUT AURA WITHOUT STATUS MIGRAINOSUS, NOT INTRACTABLE: ICD-10-CM

## 2020-11-09 RX ORDER — TOPIRAMATE 25 MG/1
TABLET ORAL
Qty: 60 TABLET | Refills: 5 | Status: SHIPPED | OUTPATIENT
Start: 2020-11-09 | End: 2021-04-25

## 2020-11-29 DIAGNOSIS — G43.709 CHRONIC MIGRAINE WITHOUT AURA WITHOUT STATUS MIGRAINOSUS, NOT INTRACTABLE: ICD-10-CM

## 2020-11-29 RX ORDER — FREMANEZUMAB-VFRM 225 MG/1.5ML
INJECTION SUBCUTANEOUS
Qty: 1.5 SYRINGE | Refills: 11 | Status: SHIPPED | OUTPATIENT
Start: 2020-11-29 | End: 2021-03-02 | Stop reason: SDUPTHER

## 2020-12-21 DIAGNOSIS — R09.82 PND (POST-NASAL DRIP): ICD-10-CM

## 2020-12-21 RX ORDER — FLUTICASONE PROPIONATE 50 MCG
2 SPRAY, SUSPENSION (ML) NASAL DAILY
Qty: 16 ML | Refills: 1 | Status: SHIPPED | OUTPATIENT
Start: 2020-12-21 | End: 2021-03-08 | Stop reason: SDUPTHER

## 2020-12-22 ENCOUNTER — TELEPHONE (OUTPATIENT)
Dept: NEUROLOGY | Facility: CLINIC | Age: 55
End: 2020-12-22

## 2020-12-22 DIAGNOSIS — G43.709 CHRONIC MIGRAINE WITHOUT AURA WITHOUT STATUS MIGRAINOSUS, NOT INTRACTABLE: ICD-10-CM

## 2020-12-22 RX ORDER — FREMANEZUMAB-VFRM 225 MG/1.5ML
INJECTION SUBCUTANEOUS
Qty: 1.5 SYRINGE | Refills: 0 | Status: CANCELLED | OUTPATIENT
Start: 2020-12-22

## 2020-12-28 DIAGNOSIS — G43.109 VERTIGINOUS MIGRAINE: ICD-10-CM

## 2020-12-28 DIAGNOSIS — G43.709 CHRONIC MIGRAINE WITHOUT AURA WITHOUT STATUS MIGRAINOSUS, NOT INTRACTABLE: ICD-10-CM

## 2020-12-28 RX ORDER — VENLAFAXINE HYDROCHLORIDE 150 MG/1
CAPSULE, EXTENDED RELEASE ORAL
Qty: 90 CAPSULE | Refills: 3 | Status: SHIPPED | OUTPATIENT
Start: 2020-12-28 | End: 2021-08-24 | Stop reason: SDUPTHER

## 2021-01-14 ENCOUNTER — TELEPHONE (OUTPATIENT)
Dept: NEUROLOGY | Facility: CLINIC | Age: 56
End: 2021-01-14

## 2021-01-14 NOTE — TELEPHONE ENCOUNTER
From   Andres Solano RN To   [place] [id number] and Delivered   12/31/2020 10:57 AM   Thank you for contacting [facility],     [facility], I attempted calling [facility] 3x and they put me through to their voicemail each time  On their voicemail, I made them aware that we do have an approval for the ajovy through your insurance and we would like them to dispense this medication to you  If you have any questions, please do not hesitate to call our office at [phone]  Thanks! Thank you for choosing [facility] for your care       Previous Messages    ----- Message -----        From:Penny J Clossey        Sent:12/30/2020  2:48 PM EST          To: Katy James PA-C     Subject:RE: Prescription Question     Yes       ----- Message -----   Marge Griffin RN        Sent:12/30/2020  8:14 AM EST          To: Florentina Hackney Clossey     Subject:RE: Prescription Question     I see we sent the prescription to [facility] @ [address], [address] [place] [address]  Is this the same store?       ----- Message -----        From:Penny J Clossey        Sent:12/30/2020  7:04 AM EST          To: Katy James PA-C     Subject:RE: Prescription Question     I'm having a difficult time getting in touch with [facility], they keep hanging up the phone  Would you be able to contact them for me about filling the Ajovy? [facility] [address] [facility] [address] [place] [address]       ----- Message -----   Marge Griffin RN        Sent:12/28/2020 11:37 AM EST          To: Florentina Hackney Clossey     Subject:RE: Prescription Question     The renewal and prior authorization are two separate items  We have been working on your prior authorization and contacting your insurance  The Ajovy is now approved from 11/28/2020-12/28/2021  I apologize if your perception is that we were "skipping a beat" but I can assure you that we have been diligently working to have this resolved for you  With the approval now in place you should be able to  the Ajovy prescription  ----- Message -----        From:Penny J Clossey        Sent:12/28/2020 10:33 AM EST          To: Deacon Silverio PA-C     Subject:RE: Prescription Question     The pharmacy will not fill the script because the insurance will not pay! I've been talking to someone in your office about this situation already  The insurance DENIED the refill of Ajovy  Karyna Slice was approved in May until November but when Deacon Silverio refilled my script at [facility], the pharmacist stated the insurance will not pay due to expiring in November  [facility] has to contact my insurance for an additional approval in order to renew the script  So now I'm dealing with migraines again because someone is skipping a beat  I just received notice a few days ago via this francis that the Karyna Slice was DENIED by the insurance!       ----- Message -----   600 Ellie Griffin RN        Sent:12/28/2020  9:16 AM EST          To: Jil Pacific Clossey     Subject:RE: Prescription Question     The request for Karyna Slice was denied because in November it was sent for a 1 year supply (11 refills provided at that time)  Therefore there should be no need for a new prescription  Are you having difficulty picking up your prescription? Have you spoken to the pharmacy?       ----- Message -----        From:Penny J Clossey        Sent:12/27/2020 10:35 PM EST          To: Deacon Silverio PA-C     Subject:Prescription Question     Please forward to Deacon Silverio   I received a message that the renewal of Ajovy was denied, so now what am I supposed to do? I've been having headaches every day for about 10 days and they're getting worse  I'm overdue for my shot  Audit [place]    [facility]'s MyChart User Last Read On   Jordyn Vaughan Not Read           From   Karlene Blair RN To   [address] and Delivered   12/31/2020 10:57 AM   Thank you for contacting [facility],     Ernie Torres, I attempted calling [facility] 3x and they put me through to their voicemail each time   On their voicemail, I made them aware that we do have an approval for the ajovy through your insurance and we would like them to dispense this medication to you  If you have any questions, please do not hesitate to call our office at [phone]  Thanks! Thank you for choosing   [facility] for your care       Previous Messages    ----- Message -----        From:Penny J Clossey        Sent:12/30/2020  2:48 PM EST          To: Ebony Menjivar PA-C     Subject:RE: Prescription Question     Yes       ----- Message -----   Marge Griffin RN        Sent:12/30/2020  8:14 AM EST          To: Alroy Crimes Clossey     Subject:RE: Prescription Question     [facility] [address], [address] [place] [address]  Is this the same store?       ----- Message -----        From:Penny J Clossey        Sent:12/30/2020  7:04 AM EST          To: Ebony Menjivar PA-C     Subject:RE: Prescription Question     I'm having a difficult time getting in [facility], they keep hanging up the phone  Would you be able to contact them for me about filling the Ajovy? [facility] [address] [facility] [address] [place] [address]       ----- Message -----   Marge Griffin RN        Sent:12/28/2020 11:37 AM EST          To: Alroy Crimes Clossey     Subject:RE: Prescription Question     The renewal and prior authorization are two separate items  We have been working on your prior authorization and contacting your insurance  The Ajovy is now approved from 11/28/2020-12/28/2021  I apologize if your perception is that we were "skipping a beat" but I can assure you that we have been diligently working to have this resolved for you  With the approval now in place you should be able to  the Ajovy prescription        ----- Message -----        From:Penny J Clossey        Sent:12/28/2020 10:33 AM EST          To: Ebony Menjivar PA-C     Subject:RE: Prescription Question     The pharmacy will not fill the script because the insurance will not pay! I've been talking to someone in your office about this situation already  The insurance DENIED the refill of Ajovy  Tom George was approved in May until November but when Blake Vilchis refilled my script at [facility], the pharmacist stated the insurance will not pay due to expiring in November  [facility] doctor has to contact my insurance for an additional approval in order to renew the script  So now I'm dealing with migraines again because someone is skipping a beat  I just received notice a few days ago via this francis that the Tom George was DENIED by the insurance!       ----- Message -----   Marge Griffin RN        Sent:12/28/2020  9:16 AM EST          To: Margeret Board Clossey     Subject:RE: Prescription Question     The request for Tom George was denied because in November it was sent for a 1 year supply (11 refills provided at that time)  Therefore there should be no need for a new prescription  Are you having difficulty picking up your prescription? Have you spoken to the pharmacy?       ----- Message -----        From:Penny J Clossey        Sent:12/27/2020 10:35 PM EST          To: Blake Vilchis PA-C     Subject:Prescription Question     Please forward to Blake Vilchsi   I received a message that the renewal of Ajovy was denied, so now what am I supposed to do? I've been having headaches every day for about 10 days and they're getting worse  I'm overdue for my shot       Audit [place]    [facility]'s MyChart User Last Read On   Lani Méndez Not Read

## 2021-02-06 DIAGNOSIS — G43.709 CHRONIC MIGRAINE WITHOUT AURA WITHOUT STATUS MIGRAINOSUS, NOT INTRACTABLE: ICD-10-CM

## 2021-02-06 DIAGNOSIS — G43.109 VERTIGINOUS MIGRAINE: ICD-10-CM

## 2021-02-07 RX ORDER — NORTRIPTYLINE HYDROCHLORIDE 10 MG/1
CAPSULE ORAL
Qty: 60 CAPSULE | Refills: 5 | Status: SHIPPED | OUTPATIENT
Start: 2021-02-07 | End: 2021-08-23 | Stop reason: ALTCHOICE

## 2021-02-07 RX ORDER — GABAPENTIN 100 MG/1
CAPSULE ORAL
Qty: 120 CAPSULE | Refills: 5 | Status: SHIPPED | OUTPATIENT
Start: 2021-02-07 | End: 2021-07-12

## 2021-02-22 ENCOUNTER — TELEPHONE (OUTPATIENT)
Dept: NEUROLOGY | Facility: CLINIC | Age: 56
End: 2021-02-22

## 2021-02-22 NOTE — TELEPHONE ENCOUNTER
Registration completed 2/23/2021 10:45AM [address] at [facility]  Covid-screening questions completed  Aware of all policies - mask, visitor and no show fee

## 2021-02-23 ENCOUNTER — OFFICE VISIT (OUTPATIENT)
Dept: NEUROLOGY | Facility: CLINIC | Age: 56
End: 2021-02-23
Payer: COMMERCIAL

## 2021-02-23 VITALS
WEIGHT: 175.6 LBS | RESPIRATION RATE: 14 BRPM | HEIGHT: 67 IN | BODY MASS INDEX: 27.56 KG/M2 | HEART RATE: 78 BPM | SYSTOLIC BLOOD PRESSURE: 140 MMHG | DIASTOLIC BLOOD PRESSURE: 78 MMHG

## 2021-02-23 DIAGNOSIS — G43.709 CHRONIC MIGRAINE WITHOUT AURA WITHOUT STATUS MIGRAINOSUS, NOT INTRACTABLE: Primary | ICD-10-CM

## 2021-02-23 DIAGNOSIS — Z77.29 CARBON MONOXIDE EXPOSURE: ICD-10-CM

## 2021-02-23 DIAGNOSIS — G43.109 VERTIGINOUS MIGRAINE: ICD-10-CM

## 2021-02-23 PROCEDURE — 99215 OFFICE O/P EST HI 40 MIN: CPT | Performed by: PHYSICIAN ASSISTANT

## 2021-02-23 NOTE — ASSESSMENT & PLAN NOTE
She has had some memory concerns since this exposure, but not significantly worse over time  She thinks a little bit worse over time  Will wean down the gabapentin as noted, and keep in mind that Topamax can also cause some cognitive issues  If she has not noticed a difference with the decrease gabapentin, we can always repeat a brain MRI    She should also continue to follow with her family physician regarding thyroid blood work which has shown some changes in the past

## 2021-02-23 NOTE — PATIENT INSTRUCTIONS
Gabapentin- decrease by holding AM dose of gabapentin x 1 week, if headaches stable can hold PM dose as well

## 2021-02-23 NOTE — PROGRESS NOTES
Patient ID: Snow Blackwell is a 54 y o  female  Assessment/Plan:    Carbon monoxide exposure    She has had some memory concerns since this exposure, but not significantly worse over time  She thinks a little bit worse over time  Will wean down the gabapentin as noted, and keep in mind that Topamax can also cause some cognitive issues  If she has not noticed a difference with the decrease gabapentin, we can always repeat a brain MRI  She should also continue to follow with her family physician regarding thyroid blood work which has shown some changes in the past     Chronic migraine without aura without status migrainosus, not intractable  Migraines are improved with the below regimen, she still has lower grade headaches 1-2 times per week  Vertigo with migraines is better controlled      Plan:  · Continue 1 5 mL Ajovy syringe subcu monthly  · Continue effexor 150 mg qAM   · Continue 20 mg pamelor qhs  · Continue gabapentin, and decreased slightly by removing the morning dose x1 week, then removed the p m /noon dose and continue 200 mg q h s  only  We are decreasing this to see if it enables further improvement in weight loss, as this is a goal of hers  If back pain or headaches get worse, she should let me know and we will go back in the dose  · Continue Topamax 50 mg q h s , will increase if needed in the future  · Continue magnesium 200 mg BID      PRN migraine: Toradol + Compazine  Add meclizine for vertigo  Diagnoses and all orders for this visit:    Chronic migraine without aura without status migrainosus, not intractable    Vertiginous migraine    Carbon monoxide exposure    Other orders  -     Iodine, Kelp, (KELP PO); Take 1 tablet by mouth daily        She is thinking about retiring soon, but it probably will not occur within the next 6-12 months    There is a significant amount of stress with her job including working greater hours without compensation, and it can contribute to more headaches, neck and back pain  The patient should not hesitate to call me prior to her follow up with any questions or concerns  Subjective:    HPI     Since last seen the patient continues the same regimen/combination of medications, except she added Topamax  She finds it beneficial for migraine prevention and she reports that she lost a few lb  The goal with Topamax was for migraine prevention and to assist with weight loss  She started can help iodine for swelling on the right side of the neck which she thought may be related to her thyroid, and she states the swelling decreased  She is following with her PCP for thyroid levels  She is thinking about retiring due to excessive stressors at work such as working very long hours without compensation, and without extra help  This will likely not occur within the next 6-12 months  She continues to have a couple low-grade headaches per week, and she knows when she is going to get a bad migraine because she has an aura / warning of significant phonophobia with blurry vision  She would describe her vision as "looking through goo" and this is not new  She did have an eye exam last year which was unremarkable for pathology in the eyes, and at this time she was having the aura but she just did not realize what it was at the time  At the time of this morning she knows headache is going to come on and so she takes Toradol and Compazine and it helps about 90%  For back pain she continues with the inversion table  She used to go to a chiropractor but not during [facility]  Prior note: Since last seen she started Ajovy and denies side effects  She uses the manual syringe  She had a rash with Emgality which has since improved  Lina Choudhury worked better however cause problematic constipation as a side effect      Since starting Ajovy she reports at least a 50 percent reduction in migraine headaches    She is happy with this medication       She reports a new symptom of a weird sensation in the head and then blanks out for a second followed by jerking of the entire body  She had this symptom when she was standing doing dishes, or other tasks  They are not frequent symptoms, but she wanted to talk about because it happened at least once or twice  She states her head feels funny and then she feels that she has blanking out  Typically associated with a migraine headache, because 24 hours after the blanking out symptoms she develops a headache      Prior documentation:  She patient had episode of severe migraine with diarrhea and emesis on Sunday, and she usually does not vomit with migraine  States she does usually get nausea but not commonly vomiting  Also reports sweating all night  She has been working harder at work as , and states does not get compensated for it, no breaks usually  States she awoke early morning (overnight) Sunday with a headache- headache woke her up, but she thought this was just from her seasonal allergies so she fell back asleep, and then in the AM woke up and headache was very severe  And since then her stomach has been very nauseous, no vomiting since though thankfully  She di dnot yet try reglan  She was prescribed steroid decadron by GP but it is not yet [facility] so she is waiting for it  She also continues to have a very nagging headache, not as severe as Sunday but continuing  Typically her migraine only lasts 1-2 days, but this so far is lasting 4 days      She thinks recent migraine is triggered by stress at work, and she also has very bad allergies      With the migraine above she also had lightheadedness, generalized weakness      She reports an itchy red rash on the back of her hands which seems to start with the emgality injection and then the rash wears off over the next few weeks, and returns with the next injection   There are a couple red dots on the back of the hands b/l, and sometimes raised and also describes them as "welts" at times  Denies SOB, dysphagia, hives/ itching anywhere else  Prednisone PO did not help  Benadryl does not help- she takes 25 mg [id number] her seasonal allergies are bad  Topical benadryl does help      HA/ migraine triggers- work (states they may cut her pay, and she is working harder/ longer without compensation), and ?seasonal allergies     Migraines "auras" involve an episode of headache with dizziness and very sensitivity to high-pitched sounds, screeching, even her dogs barking   When she gets this symptom she develops confusion and disorientation   It lasts several hours and Toradol plus Reglan (or Compazine) cocktail helps  -- fewer auras/ dizziness episodes and fewer migraines since starting [employer] well but states she wakes up frequently in the middle the night      States some irritability at work when her coworkers annoy her or bother her  She likes to work alone  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  This route is 5 hours and she has difficulty stopping to go the bathroom so she does not drink a lot of water at this time; otherwise she drinks a lot of water during the day      As noted at the last visit, migraine frequency is about once a week and much milder  States every 4-5 weeks does have acephalgic migraine      Does have HLD and states is eating less cheese now to help with high cholesterol  Takes 81 mg asa daily      Continues 100 mg magnesium BID   Also takes B12 and vit C as noted on med list      When she awakens in the middle the night with vertigo she notes going to be a bad day the next day   She knows she is going to have a lot headaches then  Radha Sifuentes also reports associated pressure diffusely in her head, noise sensitivity more than light sensitivity   Her headache is located in the back of her head left greater than right and radiates around the ears, and sometimes into the frontal regions   It feels like she is being hit in the back of her head with a bat L>R      In the past she weaned and stopped Depakote due to sedation  Verapamil gave her heart palpitations      For vertigo spells-- States in the past an ER doctor showed her vestibular exercises to do and this seems to help a bit      Repeat imaging reviewed and no new findings  The following portions of the patient's history were reviewed and updated as appropriate:   She  has a past medical history of Abnormal Pap smear of cervix (1989), Asthma, Carbon monoxide exposure (2/6/2018), Chronic back pain, Lump of skin, Migraine, Sinus bradycardia, and Varicella  She   Patient Active Problem List    Diagnosis Date Noted    Carbon monoxide exposure 08/24/2020    Encounter for gynecological examination (general) (routine) without abnormal findings 07/06/2020    Pelvic pain in female 07/06/2020    History of lumpectomy 07/06/2020    Non-intractable vomiting 05/20/2020    Seasonal allergies 05/20/2020    Skin rash 05/20/2020    Polyarthritis of multiple sites 04/17/2020    Phonophobia 02/14/2020    Vertiginous migraine 04/22/2019    Overweight (BMI 25 0-29 9) 03/01/2019    Anxiety and depression 03/01/2019    Varicose veins of both lower extremities with pain 03/01/2019    PND (post-nasal drip) 03/01/2019    Chronic migraine without aura without status migrainosus, not intractable 10/08/2018    Vertigo 02/06/2018    Hypercholesterolemia 10/22/2014     She  has a past surgical history that includes Appendectomy; Tonsillectomy; [place] tooth extraction (Bilateral); Colonoscopy; Varicose vein surgery; and Breast cyst excision (Right, 2001)  Her family history includes Arthritis in her family; Colon cancer (age of onset: 48) in her father; Diabetes in her maternal aunt; Heart attack in her father; Heart disease in her sister;  Heart failure in her mother; Hypertension in her brother; Lung cancer in her maternal grandfather; No Known Problems in her maternal grandmother, paternal aunt, paternal aunt, paternal aunt, paternal grandfather, paternal grandmother, sister, son, son, and son; Stroke in her brother and maternal aunt  She  reports that she quit smoking about 23 years ago  She has never used smokeless tobacco  She reports current alcohol use of about 7 0 standard drinks of alcohol per week  She reports that she does not use drugs  Current Outpatient Medications   Medication Sig Dispense Refill    Ajovy 225 MG/1 5ML prefilled syringe INJECT 1 SYRINGE EVERY MONTH 1 5 Syringe 11    Ascorbic Acid (CEDRICK-C PO) Take by mouth      aspirin 81 mg chewable tablet Chew 81 mg daily      cetirizine (ZyrTEC) 10 mg tablet Take 1 tablet (10 mg total) by mouth daily 90 tablet 1    Cyanocobalamin (B-12) 5000 MCG SUBL Place 5,000 mcg under the tongue daily      fluocinonide (LIDEX) 0 05 % cream       fluticasone (FLONASE) 50 mcg/act nasal spray 2 sprays into each nostril daily 16 mL 1    gabapentin (NEURONTIN) 100 mg capsule TAKE 1 CAPSULE BY MOUTH IN THE MORNING, 1 CAPSULE AT NOON AND 2 CAPSULES AT NIGHT TIME  120 capsule 5    Iodine, Kelp, (KELP PO) Take 1 tablet by mouth daily      ketorolac (TORADOL) 10 mg tablet 1 tab q6 hours prn migraine (with compazine if needed)  Max 2 per day and 3 per week   10 tablet 0    losartan (COZAAR) 50 mg tablet Take 0 5 tab PO daily 90 tablet 1    Magnesium 200 MG TABS Take 200 mg by mouth 2 (two) times a day      meclizine (ANTIVERT) 12 5 MG tablet 1 tab qhs prn dizziness and up to TID as needed 30 tablet 0    MULTIPLE VITAMINS ESSENTIAL PO Take by mouth      nortriptyline (PAMELOR) 10 mg capsule TAKE 2 TABLETS BY MOUTH AT BEDTIME 60 capsule 5    prochlorperazine (COMPAZINE) 10 mg tablet Take 1 tablet (10 mg total) by mouth every 6 (six) hours as needed for nausea or vomiting 20 tablet 0    topiramate (TOPAMAX) 25 mg tablet Take 2 TABLETS AT BEDTIME 60 tablet 5    venlafaxine (EFFEXOR-XR) 150 mg 24 hr capsule TAKE 1 CAPSULE BY MOUTH EVERY DAY 90 capsule 3     No current facility-administered medications for this visit  She is allergic to penicillins; doxycycline; erythromycin; and other            Objective:    Blood pressure 140/78, pulse 78, resp  rate 14, height 5' 7" (1 702 m), weight 79 7 kg (175 lb 9 6 oz), not currently breastfeeding  Body mass index is 27 5 kg/m²  Physical Exam    Neurological Exam  On neurologic exam, the patient is alert and oriented to time and place  Speech is fluent and articulate, and the patient follows commands appropriately  Judgment and affect appear normal  Pupils are equally round and reactive to light and extraocular muscles are intact without nystagmus  Face is symmetric  Hearing is intact  Motor examination reveals intact strength throughout  Normal gait is steady  ROS:    Review of Systems  Review of Systems - Negative except as mentioned in the HPI above  History obtained from chart review and the patient  The following portions of the patient's history were reviewed and updated as appropriate: allergies, current medications/ medication history, past family history, past medical history, past social history, past surgical history and problem list     Review of systems was reviewed and otherwise unremarkable from a neurological perspective  I have spent 40 minutes with the patient today in which greater than 50% of this time was spent in counseling/coordination of care regarding diagnoses, test results, impression, plan and potential medication side effects

## 2021-02-23 NOTE — ASSESSMENT & PLAN NOTE
Migraines are improved with the below regimen, she still has lower grade headaches 1-2 times per week  Vertigo with migraines is better controlled      Plan:  · Continue 1 5 mL Ajovy syringe subcu monthly  · Continue effexor 150 mg qAM   · Continue 20 mg pamelor qhs  · Continue gabapentin, and decreased slightly by removing the morning dose x1 week, then removed the p m /noon dose and continue 200 mg q h s  only  We are decreasing this to see if it enables further improvement in weight loss, as this is a goal of hers  If back pain or headaches get worse, she should let me know and we will go back in the dose  · Continue Topamax 50 mg q h s , will increase if needed in the future  · Continue magnesium 200 mg BID      PRN migraine: Toradol + Compazine  Add meclizine for vertigo

## 2021-02-26 ENCOUNTER — PATIENT MESSAGE (OUTPATIENT)
Dept: NEUROLOGY | Facility: CLINIC | Age: 56
End: 2021-02-26

## 2021-03-01 DIAGNOSIS — G43.709 CHRONIC MIGRAINE WITHOUT AURA WITHOUT STATUS MIGRAINOSUS, NOT INTRACTABLE: ICD-10-CM

## 2021-03-01 NOTE — TELEPHONE ENCOUNTER
Called patient for more information regarding below  Patient states that she now needs Ajovy sent as 90 day prescription to mail order pharmacy  If agreeable, please send 90 day supply to [facility] on file  Thank you!

## 2021-03-01 NOTE — TELEPHONE ENCOUNTER
Regarding: FW: Prescription Question    ----- Message -----  From: Toya Capmos MA  Sent: 2/26/2021   8:11 AM EST  To: [facility]  Subject: Prescription Question                            ----- Message from Toya aCmpos MA sent at 2/26/2021  8:11 AM EST -----       ----- Message from Vin Black to Sammi Bean PA-C sent at 2/26/2021  7:19 AM -----   My insurance is now charging an outrageous price for the Ajovy at [facility]  I contacted my insurance and they told me to have you do an electronic prescription of 90 days rather than the normal 30 days with however many refills I was allowed  But it's still for the full 90 days since I had to do a reverse claim on this last prescription  Sorry for inconvenience and being a P  I A

## 2021-03-02 RX ORDER — FREMANEZUMAB-VFRM 225 MG/1.5ML
INJECTION SUBCUTANEOUS
Qty: 3 SYRINGE | Refills: 4 | Status: SHIPPED | OUTPATIENT
Start: 2021-03-02 | End: 2021-03-03 | Stop reason: SDUPTHER

## 2021-03-03 RX ORDER — FREMANEZUMAB-VFRM 225 MG/1.5ML
INJECTION SUBCUTANEOUS
Qty: 3 SYRINGE | Refills: 4 | Status: SHIPPED | OUTPATIENT
Start: 2021-03-03 | End: 2021-11-16

## 2021-03-03 NOTE — TELEPHONE ENCOUNTER
Received a fax stating Ajovy script needs to go to [address] [place] [address] below, please sign off, thanks! I called patient and made her aware of this  I made her aware that I willl call [facility] once medication is sent to pharmacy to make sure they are the pharmacy to dispense this medication for her and what her OOP price would be

## 2021-03-04 NOTE — TELEPHONE ENCOUNTER
Called [facility] and spoke to [place]  She transferred me to the [facility] and spoke to Un[facility]  She states they will be processing this script in 24-48 hours and then they will call pt about OOP price and to set up delivery  They stated if pt had any questions  She can call [phone]      Called pt and made her aware of above and gave her phone # if she has questions

## 2021-03-08 DIAGNOSIS — R09.82 PND (POST-NASAL DRIP): ICD-10-CM

## 2021-03-08 RX ORDER — FLUTICASONE PROPIONATE 50 MCG
2 SPRAY, SUSPENSION (ML) NASAL DAILY
Qty: 16 ML | Refills: 1 | Status: SHIPPED | OUTPATIENT
Start: 2021-03-08

## 2021-03-11 ENCOUNTER — HOSPITAL ENCOUNTER (EMERGENCY)
Facility: HOSPITAL | Age: 56
Discharge: HOME | End: 2021-03-11
Attending: EMERGENCY MEDICINE
Payer: COMMERCIAL

## 2021-03-11 ENCOUNTER — TELEPHONE (OUTPATIENT)
Dept: FAMILY MEDICINE CLINIC | Facility: CLINIC | Age: 56
End: 2021-03-11

## 2021-03-11 ENCOUNTER — PATIENT MESSAGE (OUTPATIENT)
Dept: NEUROLOGY | Facility: CLINIC | Age: 56
End: 2021-03-11

## 2021-03-11 ENCOUNTER — APPOINTMENT (EMERGENCY)
Dept: CT IMAGING | Facility: HOSPITAL | Age: 56
End: 2021-03-11
Payer: COMMERCIAL

## 2021-03-11 VITALS
BODY MASS INDEX: 26.86 KG/M2 | OXYGEN SATURATION: 100 % | RESPIRATION RATE: 18 BRPM | SYSTOLIC BLOOD PRESSURE: 166 MMHG | TEMPERATURE: 99.2 F | WEIGHT: 171.52 LBS | HEART RATE: 60 BPM | DIASTOLIC BLOOD PRESSURE: 70 MMHG

## 2021-03-11 DIAGNOSIS — F41.9 ANXIETY: Primary | ICD-10-CM

## 2021-03-11 DIAGNOSIS — R51.9 CHRONIC HEADACHE: ICD-10-CM

## 2021-03-11 DIAGNOSIS — G89.29 CHRONIC HEADACHE: ICD-10-CM

## 2021-03-11 DIAGNOSIS — R03.0 ELEVATED BLOOD PRESSURE READING: ICD-10-CM

## 2021-03-11 LAB
ALBUMIN SERPL BCP-MCNC: 4 G/DL (ref 3.5–5)
ALP SERPL-CCNC: 87 U/L (ref 46–116)
ALT SERPL W P-5'-P-CCNC: 31 U/L (ref 12–78)
ANION GAP SERPL CALCULATED.3IONS-SCNC: 8 MMOL/L (ref 4–13)
AST SERPL W P-5'-P-CCNC: 19 U/L (ref 5–45)
ATRIAL RATE: 56 BPM
BASOPHILS # BLD AUTO: 0.03 THOUSANDS/ΜL (ref 0–0.1)
BASOPHILS NFR BLD AUTO: 1 % (ref 0–1)
BILIRUB SERPL-MCNC: 0.39 MG/DL (ref 0.2–1)
BUN SERPL-MCNC: 12 MG/DL (ref 5–25)
CALCIUM SERPL-MCNC: 9.6 MG/DL (ref 8.3–10.1)
CHLORIDE SERPL-SCNC: 108 MMOL/L (ref 100–108)
CO2 SERPL-SCNC: 24 MMOL/L (ref 21–32)
CREAT SERPL-MCNC: 0.98 MG/DL (ref 0.6–1.3)
EOSINOPHIL # BLD AUTO: 0.2 THOUSAND/ΜL (ref 0–0.61)
EOSINOPHIL NFR BLD AUTO: 4 % (ref 0–6)
ERYTHROCYTE [DISTWIDTH] IN BLOOD BY AUTOMATED COUNT: 12.9 % (ref 11.6–15.1)
GFR SERPL CREATININE-BSD FRML MDRD: 65 ML/MIN/1.73SQ M
GLUCOSE SERPL-MCNC: 118 MG/DL (ref 65–140)
HCT VFR BLD AUTO: 41.6 % (ref 34.8–46.1)
HGB BLD-MCNC: 13.8 G/DL (ref 11.5–15.4)
IMM GRANULOCYTES # BLD AUTO: 0.02 THOUSAND/UL (ref 0–0.2)
IMM GRANULOCYTES NFR BLD AUTO: 0 % (ref 0–2)
LYMPHOCYTES # BLD AUTO: 1.86 THOUSANDS/ΜL (ref 0.6–4.47)
LYMPHOCYTES NFR BLD AUTO: 33 % (ref 14–44)
MCH RBC QN AUTO: 31.1 PG (ref 26.8–34.3)
MCHC RBC AUTO-ENTMCNC: 33.2 G/DL (ref 31.4–37.4)
MCV RBC AUTO: 94 FL (ref 82–98)
MONOCYTES # BLD AUTO: 0.41 THOUSAND/ΜL (ref 0.17–1.22)
MONOCYTES NFR BLD AUTO: 7 % (ref 4–12)
NEUTROPHILS # BLD AUTO: 3.15 THOUSANDS/ΜL (ref 1.85–7.62)
NEUTS SEG NFR BLD AUTO: 55 % (ref 43–75)
NRBC BLD AUTO-RTO: 0 /100 WBCS
P AXIS: 74 DEGREES
PLATELET # BLD AUTO: 257 THOUSANDS/UL (ref 149–390)
PMV BLD AUTO: 9.7 FL (ref 8.9–12.7)
POTASSIUM SERPL-SCNC: 3.6 MMOL/L (ref 3.5–5.3)
PR INTERVAL: 136 MS
PROT SERPL-MCNC: 7.2 G/DL (ref 6.4–8.2)
QRS AXIS: 78 DEGREES
QRSD INTERVAL: 94 MS
QT INTERVAL: 458 MS
QTC INTERVAL: 441 MS
RBC # BLD AUTO: 4.44 MILLION/UL (ref 3.81–5.12)
SODIUM SERPL-SCNC: 140 MMOL/L (ref 136–145)
T WAVE AXIS: 73 DEGREES
TROPONIN I SERPL-MCNC: <0.02 NG/ML
VENTRICULAR RATE: 56 BPM
WBC # BLD AUTO: 5.67 THOUSAND/UL (ref 4.31–10.16)

## 2021-03-11 PROCEDURE — 96361 HYDRATE IV INFUSION ADD-ON: CPT

## 2021-03-11 PROCEDURE — 93005 ELECTROCARDIOGRAM TRACING: CPT

## 2021-03-11 PROCEDURE — 93010 ELECTROCARDIOGRAM REPORT: CPT | Performed by: INTERNAL MEDICINE

## 2021-03-11 PROCEDURE — 36415 COLL VENOUS BLD VENIPUNCTURE: CPT | Performed by: EMERGENCY MEDICINE

## 2021-03-11 PROCEDURE — 84484 ASSAY OF TROPONIN QUANT: CPT | Performed by: EMERGENCY MEDICINE

## 2021-03-11 PROCEDURE — 99285 EMERGENCY DEPT VISIT HI MDM: CPT | Performed by: EMERGENCY MEDICINE

## 2021-03-11 PROCEDURE — 99285 EMERGENCY DEPT VISIT HI MDM: CPT

## 2021-03-11 PROCEDURE — 85025 COMPLETE CBC W/AUTO DIFF WBC: CPT | Performed by: EMERGENCY MEDICINE

## 2021-03-11 PROCEDURE — 70450 CT HEAD/BRAIN W/O DYE: CPT

## 2021-03-11 PROCEDURE — 80053 COMPREHEN METABOLIC PANEL: CPT | Performed by: EMERGENCY MEDICINE

## 2021-03-11 PROCEDURE — 96360 HYDRATION IV INFUSION INIT: CPT

## 2021-03-11 RX ADMIN — SODIUM CHLORIDE 1000 ML: 0.9 INJECTION, SOLUTION INTRAVENOUS at 17:04

## 2021-03-11 NOTE — TELEPHONE ENCOUNTER
Patient phoned asking if [employer] had addressed her MyChart message  She stated to me that she has had a headache for a few days, tingling/numbness in extremities  BP readings at home 161/89 and 162/92  She denied chest pain  I advised that she should go to ER if she is experiencing continued symptoms       She asked if [employer] can advise any change in BP meds

## 2021-03-11 NOTE — TELEPHONE ENCOUNTER
Spoke to patient  Reported mental fogginess, double vision, head tingling, general weakness in addition to symptoms already listed  Lots of stress at work  Read Robert's message verbatim to patient  Strongly urged patient to go to ED  She stated she will [facility] to [facility]  Wanted to schedule a f/u appointment with Dudley Aase tomorrow, just in case she gets d/c'ed before getting all the answers/results back

## 2021-03-11 NOTE — ED NOTES
Per provider note, patient can be discharged after IVF have finished infusing     Janelle Sherwood, NII  03/11/21 2648

## 2021-03-11 NOTE — TELEPHONE ENCOUNTER
She needs to be seen, either in ED or in office  We may have time on schedule, later today or tomorrow  Last time seen in office was over 2 years ago  If she has an atypical headache, neurologic symptoms and/or elevated BP readings she should be probably see evaluated at ED

## 2021-03-11 NOTE — ED PROVIDER NOTES
History  Chief Complaint   Patient presents with    Weakness - Generalized     Pt presents to the ED with a cornucopia of complaints  Nonspefici onset  Pt alludes to c/o of severe headache x 1 week and overall sense of feeling unwell and weakness   Stress     Reports under a significant amount of stress as chief complaint  53 y/o female presents today with multiple complaints including chronic headaches, tingling all over and high stress  She states her vision is intermittently blurry and her blood pressure has been running high lately  She called her PCP who directed her to the ED to 'get checked out' and if things are ok in the ED he will see her in the office tomorrow  Patient denies fever  She is extremely tearful and difficult to get a history out of  She says she gets chronic daily headaches/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 Taking? Ascorbic Acid (CEDRICK-C PO)  Self Yes No   Sig: Take by mouth   Cyanocobalamin (B-12) 5000 MCG SUBL  Self Yes No   Sig: Place 5,000 mcg under the tongue daily   Iodine, Kelp, (KELP PO)  Self Yes No   Sig: Take 1 tablet by mouth daily   MULTIPLE VITAMINS ESSENTIAL PO  Self Yes No   Sig: Take by mouth   Magnesium 200 MG TABS  Self Yes No   Sig: Take 200 mg by mouth 2 (two) times a day   aspirin 81 mg chewable tablet  Self Yes No   Sig: Chew 81 mg daily   cetirizine (ZyrTEC) 10 mg tablet  Self No No   Sig: Take 1 tablet (10 mg total) by mouth daily   fluocinonide (LIDEX) 0 05 % cream  Self Yes No   fluticasone (FLONASE) 50 mcg/act nasal spray  Self No No   Si sprays into each nostril daily   fremanezumab-vfrm (Ajovy) 225 MG/1 5ML prefilled syringe  Self No No   Si subcu injection q30 days     gabapentin (NEURONTIN) 100 mg capsule  Self No No   Sig: TAKE 1 CAPSULE BY MOUTH IN THE MORNING, 1 CAPSULE AT NOON AND 2 CAPSULES AT NIGHT TIME  Patient taking differently: 1 capsule in afternoon and 2 capsules at night   ketorolac (TORADOL) 10 mg tablet  Self No No   Si tab q6 hours prn migraine (with compazine if needed)  Max 2 per day and 3 per week     losartan (COZAAR) 50 mg tablet  Self No No   Sig: Take 0 5 tab PO daily   meclizine (ANTIVERT) 12 5 MG tablet  Self No No   Si tab qhs prn dizziness and up to TID as needed   nortriptyline (PAMELOR) 10 mg capsule  Self No No   Sig: TAKE 2 TABLETS BY MOUTH AT BEDTIME   Patient not taking: Reported on 3/12/2021   prochlorperazine (COMPAZINE) 10 mg tablet  Self No No   Sig: Take 1 tablet (10 mg total) by mouth every 6 (six) hours as needed for nausea or vomiting   topiramate (TOPAMAX) 25 mg tablet  Self No No   Sig: Take 2 TABLETS AT BEDTIME   venlafaxine (EFFEXOR-XR) 150 mg 24 hr capsule  Self No No   Sig: TAKE 1 CAPSULE BY MOUTH EVERY DAY      Facility-Administered Medications: None       Past Medical History:   Diagnosis Date    Abnormal Pap smear of cervix     all normal since    Asthma     Carbon monoxide exposure 2018    Chronic back pain     Lump of skin     last assessed 13    Migraine     Sinus bradycardia     last assessed 10/25/16    Varicella        Past Surgical History:   Procedure Laterality Date    APPENDECTOMY      BREAST CYST EXCISION Right     benign    COLONOSCOPY      TONSILLECTOMY      VARICOSE VEIN SURGERY      WISDOM TOOTH EXTRACTION Bilateral        Family History   Problem Relation Age of Onset    Heart failure Mother         CHF    Heart attack Father     Colon cancer Father 48    Hypertension Brother     Stroke Brother     Diabetes Maternal Aunt     Stroke Maternal Aunt     Arthritis Family     Heart disease Sister     No Known Problems Maternal Grandmother     Lung cancer Maternal Grandfather     No Known Problems Paternal Grandmother     No Known Problems Paternal Grandfather     No Known Problems Son  No Known Problems Son     No Known Problems Son     No Known Problems Sister     No Known Problems Paternal Aunt     No Known Problems Paternal Aunt     No Known Problems Paternal Aunt     Breast cancer Neg Hx      I have reviewed and agree with the history as documented  E-Cigarette/Vaping     E-Cigarette/Vaping Substances     Social History     Tobacco Use    Smoking status: Former Smoker     Quit date:      Years since quittin 2    Smokeless tobacco: Never Used   Substance Use Topics    Alcohol use: Yes     Alcohol/week: 7 0 standard drinks     Types: 7 Glasses of wine per week     Frequency: 2-3 times a week     Drinks per session: 1 or 2     Binge frequency: Never     Comment: social per [employer]    Drug use: No       Review of Systems   Constitutional: Positive for fatigue  Negative for chills and fever  HENT: Negative for postnasal drip, sore throat and trouble swallowing  Eyes: Negative for visual disturbance  Respiratory: Negative for chest tightness and shortness of breath  Gastrointestinal: Negative for abdominal pain  Genitourinary: Negative for dysuria  Musculoskeletal: Negative for back pain  Skin: Negative for rash  Allergic/Immunologic: Negative for immunocompromised state  Neurological: Positive for numbness and headaches  Negative for dizziness, weakness and light-headedness  Psychiatric/Behavioral: The patient is nervous/anxious  Physical Exam  Physical Exam  Vitals signs and nursing note reviewed  Constitutional:       Appearance: She is well-developed  Comments: anxious   HENT:      Head: Normocephalic and atraumatic  Mouth/Throat:      Mouth: Mucous membranes are moist       Pharynx: Uvula midline  Tonsils: No tonsillar exudate  Eyes:      Pupils: Pupils are equal, round, and reactive to light  Neck:      Musculoskeletal: Normal range of motion and neck supple     Cardiovascular:      Rate and Rhythm: Normal rate and regular rhythm  Pulmonary:      Effort: Pulmonary effort is normal       Breath sounds: Normal breath sounds  Abdominal:      General: Bowel sounds are normal       Palpations: Abdomen is soft  Tenderness: There is no abdominal tenderness  There is no guarding or rebound  Musculoskeletal:         General: No tenderness or deformity  Skin:     General: Skin is warm and dry  Capillary Refill: Capillary refill takes less than 2 seconds  Neurological:      General: No focal deficit present  Mental Status: She is alert and oriented to person, place, and time  Comments: Patient moving all extremities equally, no focal neuro deficits noted  Psychiatric:         Mood and Affect: Mood is anxious  Speech: Speech normal          Behavior: Behavior normal          Thought Content:  Thought content normal          Vital Signs  ED Triage Vitals [03/11/21 1621]   Temperature Pulse Respirations Blood Pressure SpO2   99 2 °F (37 3 °C) 75 16 131/78 98 %      Temp Source Heart Rate Source Patient Position - Orthostatic VS BP Location FiO2 (%)   Oral Monitor Sitting Right arm --      Pain Score       8           Vitals:    03/11/21 1621 03/11/21 1800 03/11/21 1900   BP: 131/78 150/78 166/70   Pulse: 75 60 60   Patient Position - Orthostatic VS: Sitting           Visual Acuity  Visual Acuity      Most Recent Value   L Pupil Size (mm)  3   R Pupil Size (mm)  3          ED Medications  Medications   sodium chloride 0 9 % bolus 1,000 mL (0 mL Intravenous Stopped 3/11/21 1934)       Diagnostic Studies  Results Reviewed     Procedure Component Value Units Date/Time    Comprehensive metabolic panel [[id number]] Collected: 03/11/21 1652    Lab Status: Final result Specimen: Blood from Arm, Right Updated: 03/11/21 1721     Sodium 140 mmol/L      Potassium 3 6 mmol/L      Chloride 108 mmol/L      CO2 24 mmol/L      ANION GAP 8 mmol/L      BUN 12 mg/dL      Creatinine 0 98 mg/dL      Glucose 118 mg/dL Calcium 9 6 mg/dL      AST 19 U/L      ALT 31 U/L      Alkaline Phosphatase 87 U/L      Total Protein 7 2 g/dL      Albumin 4 0 g/dL      Total Bilirubin 0 39 mg/dL      eGFR 65 ml/min/1 73sq m     Narrative:      [place] guidelines for Chronic Kidney Disease (CKD):     Stage 1 with normal or high GFR (GFR > 90 mL/min/1 73 square meters)    Stage 2 Mild CKD (GFR = 60-89 mL/min/1 73 square meters)    Stage 3A Moderate CKD (GFR = 45-59 mL/min/1 73 square meters)    Stage 3B Moderate CKD (GFR = 30-44 mL/min/1 73 square meters)    Stage 4 Severe CKD (GFR = 15-29 mL/min/1 73 square meters)    Stage 5 End Stage CKD (GFR <15 mL/min/1 73 square meters)  Note: GFR calculation is accurate only with a steady state creatinine    Troponin I [[id number]]  (Normal) Collected: 03/11/21 1652    Lab Status: Final result Specimen: Blood from Arm, Right Updated: 03/11/21 1720     Troponin I <0 02 ng/mL     CBC and differential [[id number]] Collected: 03/11/21 1652    Lab Status: Final result Specimen: Blood from Arm, Right Updated: 03/11/21 1659     WBC 5 67 Thousand/uL      RBC 4 44 Million/uL      Hemoglobin 13 8 g/dL      Hematocrit 41 6 %      MCV 94 fL      MCH 31 1 pg      MCHC 33 2 g/dL      RDW 12 9 %      MPV 9 7 fL      Platelets 166 Thousands/uL      nRBC 0 /100 WBCs      Neutrophils Relative 55 %      Immat GRANS % 0 %      Lymphocytes Relative 33 %      Monocytes Relative 7 %      Eosinophils Relative 4 %      Basophils Relative 1 %      Neutrophils Absolute 3 15 Thousands/µL      Immature Grans Absolute 0 02 Thousand/uL      Lymphocytes Absolute 1 86 Thousands/µL      Monocytes Absolute 0 41 Thousand/µL      Eosinophils Absolute 0 20 Thousand/µL      Basophils Absolute 0 03 Thousands/µL                  CT head without contrast   Final Result by Jerry Puente MD (03/11 1722)   No acute intracranial abnormality     Workstation performed: [id number]                 Procedures  ECG 12 Lead Documentation Only    Date/Time: 3/11/2021 6:34 PM  Performed by: Zenia Rivera DO  Authorized by: Austyn Yanez DO     Indications / Diagnosis:  Dizziness  ECG reviewed by me, the ED Provider: yes    Patient location:  ED  Previous ECG:     Previous ECG:  Compared to current  Comments:      Sinus bradycardia at 56 beats per minute  Normal axis, normal intervals, nonspecific ST T wave abnormalities present which are unchanged from prior from 01/31/2018  ED Course                                           MDM  Number of Diagnoses or Management Options  Anxiety: new and requires workup  Chronic headache: new and requires workup  Elevated blood pressure reading: new and requires workup  Diagnosis management comments: 6:19 PM  Patient feeling better  Blood pressure has been normal   Labs and CT head unremarkable  Stable for discharge  F/u with PCP tomorrow as scheduled  RTED instructions reviewed          Amount and/or Complexity of Data Reviewed  Clinical lab tests: ordered and reviewed  Tests in the radiology section of CPT®: ordered and reviewed  Tests in the medicine section of CPT®: ordered and reviewed  Independent visualization of images, tracings, or specimens: yes    Risk of Complications, Morbidity, and/or Mortality  Presenting problems: high  Diagnostic procedures: high  Management options: high    Patient Progress  Patient progress: stable      Disposition  Final diagnoses:   Anxiety   Elevated blood pressure reading   Chronic headache     Time reflects when diagnosis was documented in both MDM as applicable and the Disposition within this note     Time User Action Codes Description Comment    3/11/2021  6:17 PM Jael Irwin [F41 9] Anxiety     3/11/2021  6:17 PM Luis Fernando Chino Add [R03 0] Elevated blood pressure reading     3/11/2021  6:17 PM Jie Yanez Add [R51 9,  G89 29] Chronic headache       ED Disposition     ED Disposition Condition Date/Time Comment    Discharge Stable Thu Mar 11, 2021  6:17 PM Mabel Ye discharge to home/self care  Follow-up Information     Follow up With Specialties Details Why Contact Info Additional Candice Doyle PA-C Family Medicine, Physician Assistant Schedule an appointment as soon as possible for a visit   [address] [place] [address] Emergency Department Emergency Medicine  If symptoms worsen [address] [facility] [address] [facility] Emergency Department, [address], [place], [place], [address]          Discharge Medication List as of 3/11/2021  6:18 PM      CONTINUE these medications which have NOT CHANGED    Details   Ascorbic Acid (CEDRICK-C PO) Take by mouth, Historical Med      aspirin 81 mg chewable tablet Chew 81 mg daily, Historical Med      cetirizine (ZyrTEC) 10 mg tablet Take 1 tablet (10 mg total) by mouth daily, Starting Mon 9/28/2020, Normal      Cyanocobalamin (B-12) 5000 MCG SUBL Place 5,000 mcg under the tongue daily, Historical Med      fluocinonide (LIDEX) 0 05 % cream Starting Mon 8/17/2020, Historical Med      fluticasone (FLONASE) 50 mcg/act nasal spray 2 sprays into each nostril daily, Starting Mon 3/8/2021, Normal      fremanezumab-vfrm (Ajovy) 225 MG/1 5ML prefilled syringe 1 subcu injection q30 days  , Normal      gabapentin (NEURONTIN) 100 mg capsule TAKE 1 CAPSULE BY MOUTH IN THE MORNING, 1 CAPSULE AT NOON AND 2 CAPSULES AT NIGHT TIME , Normal      Iodine, Kelp, (KELP PO) Take 1 tablet by mouth daily, Historical Med      ketorolac (TORADOL) 10 mg tablet 1 tab q6 hours prn migraine (with compazine if needed)   Max 2 per day and 3 per week , Normal      losartan (COZAAR) 50 mg tablet Take 0 5 tab PO daily, Normal      Magnesium 200 MG TABS Take 200 mg by mouth 2 (two) times a day, Historical Med      meclizine (ANTIVERT) 12 5 MG tablet 1 tab qhs prn dizziness and up to TID as needed, Normal MULTIPLE VITAMINS ESSENTIAL PO Take by mouth, Historical Med      nortriptyline (PAMELOR) 10 mg capsule TAKE 2 TABLETS BY MOUTH AT BEDTIME, Normal      prochlorperazine (COMPAZINE) 10 mg tablet Take 1 tablet (10 mg total) by mouth every 6 (six) hours as needed for nausea or vomiting, Starting u 10/1/2020, Normal      topiramate (TOPAMAX) 25 mg tablet Take 2 TABLETS AT BEDTIME, Normal      venlafaxine (EFFEXOR-XR) 150 mg 24 hr capsule TAKE 1 CAPSULE BY MOUTH EVERY DAY, Normal           No discharge procedures on file      PDMP Review     None          ED Provider  Electronically Signed by           Jesus Manuel Moses, DO  03/13/21 54 [facility], DO  03/13/21 4775

## 2021-03-12 ENCOUNTER — OFFICE VISIT (OUTPATIENT)
Dept: FAMILY MEDICINE CLINIC | Facility: CLINIC | Age: 56
End: 2021-03-12
Payer: COMMERCIAL

## 2021-03-12 VITALS
TEMPERATURE: 97.4 F | RESPIRATION RATE: 16 BRPM | HEART RATE: 78 BPM | BODY MASS INDEX: 26.84 KG/M2 | WEIGHT: 171 LBS | DIASTOLIC BLOOD PRESSURE: 98 MMHG | HEIGHT: 67 IN | SYSTOLIC BLOOD PRESSURE: 144 MMHG

## 2021-03-12 DIAGNOSIS — I10 ESSENTIAL HYPERTENSION: ICD-10-CM

## 2021-03-12 DIAGNOSIS — F32.A ANXIETY AND DEPRESSION: ICD-10-CM

## 2021-03-12 DIAGNOSIS — G43.709 CHRONIC MIGRAINE WITHOUT AURA WITHOUT STATUS MIGRAINOSUS, NOT INTRACTABLE: Primary | ICD-10-CM

## 2021-03-12 DIAGNOSIS — F41.9 ANXIETY AND DEPRESSION: ICD-10-CM

## 2021-03-12 PROCEDURE — 3725F SCREEN DEPRESSION PERFORMED: CPT | Performed by: PHYSICIAN ASSISTANT

## 2021-03-12 PROCEDURE — 99214 OFFICE O/P EST MOD 30 MIN: CPT | Performed by: PHYSICIAN ASSISTANT

## 2021-03-12 PROCEDURE — 1036F TOBACCO NON-USER: CPT | Performed by: PHYSICIAN ASSISTANT

## 2021-03-12 PROCEDURE — 3008F BODY MASS INDEX DOCD: CPT | Performed by: PHYSICIAN ASSISTANT

## 2021-03-12 RX ORDER — LOSARTAN POTASSIUM 100 MG/1
TABLET ORAL
Qty: 90 TABLET | Refills: 1 | Status: CANCELLED | OUTPATIENT
Start: 2021-03-12

## 2021-03-12 NOTE — ASSESSMENT & PLAN NOTE
Currently managed on Effexor  mg  Primary stressor is work at [employer]  Patient discussed considering leaving work due to worsening conditions  Encouraged to see therapist  Does not have time right now

## 2021-03-12 NOTE — ASSESSMENT & PLAN NOTE
BP Readings from Last 3 Encounters:   03/12/21 144/98   03/11/21 166/70   02/23/21 140/78   Currently managed on losartan 25 mg   Uses Compazine PRN for migraine  Stable CMP yesterday    - Increased losartan to 50 mg  FU nurse visit for BP check in 1-2 weeks

## 2021-03-12 NOTE — LETTER
March 12, 2021     Patient: Roly Or   YOB: 1965   Date of Visit: 3/12/2021       To Whom It May Concern:    Please be advised that Demarcus Moscoso is under the care of [facility]  She is unable to work at this time due to her current medical condition  We expect she will be able to return to work on March 23, 2021, dependent on whether her symptoms improve with medication management and rest  Please feel free to call our office should you have any questions or concerns at [phone]      Sincerely,          John Culp PA-C

## 2021-03-12 NOTE — TELEPHONE ENCOUNTER
Please advise her to hold the nortriptyline dose, please stop the full 20 mg q h s  as this can sometimes increase heart rate, jitteriness, especially in conjunction with the Effexor  Please ask if she would be agreeable to temporary dose of olanzapine at night for better sleep and headache reduction  Of course will provide 7 business days off of work  Will construct letter if she needs this  Please have her call back before or after the 7 days if HAs not better controlled

## 2021-03-12 NOTE — TELEPHONE ENCOUNTER
Patient agreeable to medication and also to letter  Letter drafted for you to sign for patient  Patient requesting for letter to be emailed to her  Please send medication to [facility] on file      TY

## 2021-03-12 NOTE — TELEPHONE ENCOUNTER
Jamison De Paz  to BING Magaña          3/11/21 1:43 AM  I've been under a lot of duress at my place of work as of late which has caused headaches daily, I cannot sleep, I'm having nightmares and my stomach is feeling sick  My face, head, arms, hands and legs have also been tingling strangely and I cannot focus  This is making me quite emotional  I'm calling out of work the next couple of days but I know they won't allow me to stay out longer without a note, so I'm asking if I can possibly get a note from you keeping me out of work for at least a week so I can recoup? I also believe I need to renew my FMLA forms at which I know I need to pay for and that's fine   Thank you, and the nurses in advance for your help with this matter

## 2021-03-12 NOTE — PROGRESS NOTES
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  Patient discussed considering leaving work due to worsening conditions  Encouraged to see therapist  Does not have time right now  Subjective:    Patient ID: Noreen Ivan is a 54 y o  female  Pt is presenting today for Concern for elevated BP  At home BP of 160s/80s  Patient was evaluated at [facility] ED yesterday for migraine and anxiety  CBC, TSH, CMP are WNL  CT head showed no acute intracranial abnormality  Sinus bradycardia with no change on ECG    Spoke with Neurology today and they are d/c Nortriptyline, morning gabapentin 100 mg and starting Olanzapine at night for 7 days  Currently taking Ajovy q30 days, Nortriptyline 10 mg QHS, Topamax 50 mg QHS and venlafaxine 150 mg QD and Gabapentin 100 mg BID and 200 mg QHS  Uses Toradal 10 mg 3/12 and Compazine 10 mg PRN  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 and also staying safe  Does not feel the balance is well managed  The following portions of the patient's history were reviewed and updated as appropriate: allergies, current medications and problem list     Review of Systems   Constitutional: Negative for fatigue, fever and unexpected weight change  HENT: Negative for rhinorrhea and sore throat  Respiratory: Negative for cough, shortness of breath and wheezing      Cardiovascular: Negative for chest pain, palpitations and leg swelling  Gastrointestinal: Negative for constipation, diarrhea and nausea  Musculoskeletal: Negative for arthralgias and myalgias  Allergic/Immunologic: Positive for environmental allergies  Neurological: Positive for headaches  Psychiatric/Behavioral: Negative for sleep disturbance  The patient is nervous/anxious  Objective:  /98   Pulse 78   Temp (!) 97 4 °F (36 3 °C)   Resp 16   Ht 5' 7" (1 702 m)   Wt 77 6 kg (171 lb)   BMI 26 78 kg/m²      Physical Exam  Vitals signs reviewed  Constitutional:       General: She is not in acute distress  Appearance: She is well-developed  She is not diaphoretic  HENT:      Head: Normocephalic and atraumatic  Eyes:      Pupils: Pupils are equal, round, and reactive to light  Neck:      Musculoskeletal: Normal range of motion and neck supple  Cardiovascular:      Rate and Rhythm: Normal rate and regular rhythm  Heart sounds: Normal heart sounds  No murmur  No friction rub  No gallop  Pulmonary:      Effort: Pulmonary effort is normal  No respiratory distress  Breath sounds: Normal breath sounds  No wheezing or rales  Skin:     General: Skin is warm and dry  Neurological:      Mental Status: She is alert and oriented to person, place, and time  Psychiatric:         Mood and Affect: Mood is anxious  Behavior: Behavior normal          Thought Content: Thought content normal        BMI Counseling: Body mass index is 26 78 kg/m²  The BMI is above normal  Nutrition recommendations include decreasing portion sizes, encouraging healthy choices of fruits and vegetables, limiting drinks that contain sugar and reducing intake of cholesterol  Exercise recommendations include moderate physical activity 150 minutes/week  No pharmacotherapy was ordered

## 2021-03-12 NOTE — TELEPHONE ENCOUNTER
Patient calling today regarding her headaches/high BP  Patient was seen in ED yesterday for this and anxiety  She states she is constantly getting hot flashes and having profuse sweating  She also reports she cannot  her 4lb dog d/t subjective weakness  Patient reports nausea/no vomiting, hearing sounds in her head and feels like she hears and feels her blood flowing in her body  Patient currently taking  Ajovy q30 days  Gabapentin 100mg BID and 200mg QHS  topiramate 50mg QHS  Venlafaxine 150mg QD  Nortriptyline 10mg QHS    Last time she used toradol 10mg 3/12  She states that she is using this daily (she then stated she is using it 3x per week when I educated her on the use of the medication)    Compazine 10mg last used 3/12 x 1  Patient is taking 25mg QD for her hypertension  She is in contact with her physician that she sees for this  She is scheduled with her PCP today and was encouraged to discuss her symptoms with him, as uncontrolled hypertension could produce some of the symptoms she is reporting  Patient verbalized understanding  [address] Adkins, please advise on any recommendations and please review the ED notes for patient as well as the [employer] message she sent below      TY

## 2021-03-13 ENCOUNTER — DOCUMENTATION (OUTPATIENT)
Dept: NEUROLOGY | Facility: CLINIC | Age: 56
End: 2021-03-13

## 2021-03-13 ENCOUNTER — TELEPHONE (OUTPATIENT)
Dept: OTHER | Facility: OTHER | Age: 56
End: 2021-03-13

## 2021-03-13 DIAGNOSIS — R51.9 HEADACHE: Primary | ICD-10-CM

## 2021-03-13 RX ORDER — OLANZAPINE 5 MG/1
5 TABLET ORAL
Qty: 5 TABLET | Refills: 0 | Status: SHIPPED | OUTPATIENT
Start: 2021-03-13 | End: 2021-08-23 | Stop reason: ALTCHOICE

## 2021-03-13 NOTE — TELEPHONE ENCOUNTER
[place] [facility] on call: Answering service call: Pedro Pablo Liriano, 1965, [phone], "I spoke to the nurse at [facility] office yesterday and she told me a new medication, Olanzapine was going to be ordered for me for my migraines  I called the pharmacy and there is nothing there " Per telephone message yesterday Olanzapine was to be sent to [facility] in [address] [place] [address] but it looks like Joseph Padron did not do that as of yet  Would you be willing to send it in for her?  Thank you

## 2021-03-18 RX ORDER — OLANZAPINE 5 MG/1
5 TABLET ORAL
Qty: 30 TABLET | Refills: 0 | Status: SHIPPED | OUTPATIENT
Start: 2021-03-18 | End: 2021-07-12

## 2021-03-18 NOTE — TELEPHONE ENCOUNTER
Patient completed 5 days of olanzapine  States she is feeling much better, but would like to know if she can do 30 days of medication instead of the nortriptyline since she had so many problems with it  I have queued this up for you if you are agreeable      TY

## 2021-03-22 DIAGNOSIS — J30.89 ENVIRONMENTAL AND SEASONAL ALLERGIES: ICD-10-CM

## 2021-03-22 RX ORDER — CETIRIZINE HYDROCHLORIDE 10 MG/1
10 TABLET ORAL DAILY
Qty: 90 TABLET | Refills: 1 | Status: SHIPPED | OUTPATIENT
Start: 2021-03-22

## 2021-03-29 ENCOUNTER — TELEMEDICINE (OUTPATIENT)
Dept: FAMILY MEDICINE CLINIC | Facility: CLINIC | Age: 56
End: 2021-03-29
Payer: COMMERCIAL

## 2021-03-29 DIAGNOSIS — B34.9 VIRAL INFECTION, UNSPECIFIED: ICD-10-CM

## 2021-03-29 DIAGNOSIS — Z03.818 ENCOUNTER FOR OBSERVATION FOR SUSPECTED EXPOSURE TO OTHER BIOLOGICAL AGENTS RULED OUT: ICD-10-CM

## 2021-03-29 PROCEDURE — U0005 INFEC AGEN DETEC AMPLI PROBE: HCPCS | Performed by: FAMILY MEDICINE

## 2021-03-29 PROCEDURE — U0003 INFECTIOUS AGENT DETECTION BY NUCLEIC ACID (DNA OR RNA); SEVERE ACUTE RESPIRATORY SYNDROME CORONAVIRUS 2 (SARS-COV-2) (CORONAVIRUS DISEASE [COVID-19]), AMPLIFIED PROBE TECHNIQUE, MAKING USE OF HIGH THROUGHPUT TECHNOLOGIES AS DESCRIBED BY CMS-2020-01-R: HCPCS | Performed by: FAMILY MEDICINE

## 2021-03-29 PROCEDURE — 99213 OFFICE O/P EST LOW 20 MIN: CPT | Performed by: FAMILY MEDICINE

## 2021-03-29 NOTE — PROGRESS NOTES
COVID-19 Virtual Visit     Assessment/Plan:    Problem List Items Addressed This Visit     None      Visit Diagnoses     Encounter for observation for suspected exposure to other biological agents ruled out        Relevant Orders    Novel Coronavirus (Covid-19),PCR [facility] - Collected at [facility] or [facility]    Viral infection, unspecified        Relevant Orders    Novel Coronavirus (Covid-19),PCR [facility] - Collected at [place] [address] or [facility]         Disposition:     I referred patient to one of our centralized sites for a COVID-19 swab  Reviewed guidelines for quarantine, recommend supportive care  Discussed that with chronicity unlikely to bacterial infection  I have spent 12 minutes directly with the patient  Greater than 50% of this time was spent in counseling/coordination of care regarding: instructions for management, patient and family education and impressions  Encounter provider Mila Smith MD    Provider located at [place] [address]  [phone]    Recent Visits  No visits were found meeting these conditions  Showing recent visits within past 7 days and meeting all other requirements     Today's Visits  Date Type Provider Dept   03/29/21 Telemedicine Mila Smith MD [facility]   Showing today's visits and meeting all other requirements     Future Appointments  No visits were found meeting these conditions  Showing future appointments within next 150 days and meeting all other requirements      This virtual check-in was done via [employer] and patient was informed that this is a secure, HIPAA-compliant platform  She agrees to proceed  Patient agrees to participate in a virtual check in via telephone or video visit instead of presenting to the office to address urgent/immediate medical needs  Patient is aware this is a billable service      After connecting through [facility], the patient was identified by name and date of birth  Susan Kearney was informed that this was a telemedicine visit and that the exam was being conducted confidentially over secure lines  My office door was closed  No one else was in the room  Susan Kearney acknowledged consent and understanding of privacy and security of the telemedicine visit  I informed the patient that I have reviewed her record in Epic and presented the opportunity for her to ask any questions regarding the visit today  The patient agreed to participate  Subjective:   Susan Kearney is a 54 y o  female who is concerned about COVID-19  Patient's symptoms include fever, chills, fatigue, malaise, nasal congestion, rhinorrhea, sore throat, cough, chest tightness, myalgias and headache  Patient denies anosmia, loss of taste, shortness of breath, abdominal pain, nausea, vomiting and diarrhea  Date of symptom onset: 3/26/2021    Exposure:   Contact with a person who is under investigation (PUI) for or who is positive for COVID-19 within the last 14 days?: No    Hospitalized recently for fever and/or lower respiratory symptoms?: No      Currently a healthcare worker that is involved in direct patient care?: No      Works in a special setting where the risk of COVID-19 transmission may be high? (this may include long-term care, correctional and group home facilities; homeless shelters; assisted-living facilities and group homes ): No      Resident in a special setting where the risk of COVID-19 transmission may be high? (this may include long-term care, correctional and group home facilities; homeless shelters; assisted-living facilities and group homes ): No      Patient reports that patient started having symptoms of chills, body aches, fatigue, feeling fevers  She had a fever of 101 on Friday night  She then developed symptoms of congestion, rhinorrhea, cough  She reports that she had severe body aches and feels generally   She is wondering if this is allergies or a sinus infection  She has been taking Tylenol every 6 hours, nasal flush, Robitussin  No known sick contacts, but she works at the [employer]  Lab Results   Component Value Date    SARSCOV2 Not Detected 05/18/2020     Past Medical History:   Diagnosis Date    Abnormal Pap smear of cervix 1989    all normal since    Asthma     Carbon monoxide exposure 2/6/2018    Chronic back pain     Lump of skin     last assessed 11/21/13    Migraine     Sinus bradycardia     last assessed 10/25/16    Varicella      Past Surgical History:   Procedure Laterality Date    APPENDECTOMY      BREAST CYST EXCISION Right 2001    benign    COLONOSCOPY      TONSILLECTOMY      VARICOSE VEIN SURGERY      WISDOM TOOTH EXTRACTION Bilateral      Current Outpatient Medications   Medication Sig Dispense Refill    Ascorbic Acid (CEDRICK-C PO) Take by mouth      aspirin 81 mg chewable tablet Chew 81 mg daily      cetirizine (ZyrTEC) 10 mg tablet Take 1 tablet (10 mg total) by mouth daily 90 tablet 1    Cyanocobalamin (B-12) 5000 MCG SUBL Place 5,000 mcg under the tongue daily      fluocinonide (LIDEX) 0 05 % cream       fluticasone (FLONASE) 50 mcg/act nasal spray 2 sprays into each nostril daily 16 mL 1    fremanezumab-vfrm (Ajovy) 225 MG/1 5ML prefilled syringe 1 subcu injection q30 days  3 Syringe 4    gabapentin (NEURONTIN) 100 mg capsule TAKE 1 CAPSULE BY MOUTH IN THE MORNING, 1 CAPSULE AT NOON AND 2 CAPSULES AT NIGHT TIME  (Patient taking differently: 1 capsule in afternoon and 2 capsules at night) 120 capsule 5    Iodine, Kelp, (KELP PO) Take 1 tablet by mouth daily      ketorolac (TORADOL) 10 mg tablet 1 tab q6 hours prn migraine (with compazine if needed)  Max 2 per day and 3 per week   10 tablet 0    losartan (COZAAR) 50 mg tablet Take 0 5 tab PO daily 90 tablet 1    Magnesium 200 MG TABS Take 200 mg by mouth 2 (two) times a day      meclizine (ANTIVERT) 12 5 MG tablet 1 tab qhs prn dizziness and up to TID as needed 30 tablet 0    MULTIPLE VITAMINS ESSENTIAL PO Take by mouth      nortriptyline (PAMELOR) 10 mg capsule TAKE 2 TABLETS BY MOUTH AT BEDTIME (Patient not taking: Reported on 3/12/2021) 60 capsule 5    OLANZapine (ZyPREXA) 5 mg tablet Take 1 tablet (5 mg total) by mouth daily at bedtime for 5 days 5 tablet 0    OLANZapine (ZyPREXA) 5 mg tablet Take 1 tablet (5 mg total) by mouth daily at bedtime 30 tablet 0    prochlorperazine (COMPAZINE) 10 mg tablet Take 1 tablet (10 mg total) by mouth every 6 (six) hours as needed for nausea or vomiting 20 tablet 0    topiramate (TOPAMAX) 25 mg tablet Take 2 TABLETS AT BEDTIME 60 tablet 5    venlafaxine (EFFEXOR-XR) 150 mg 24 hr capsule TAKE 1 CAPSULE BY MOUTH EVERY DAY 90 capsule 3     No current facility-administered medications for this visit  Allergies   Allergen Reactions    Penicillins     Doxycycline     Erythromycin     Other      Mushrooms       Review of Systems   Constitutional: Positive for chills, fatigue and fever  HENT: Positive for congestion, rhinorrhea and sore throat  Respiratory: Positive for cough and chest tightness  Negative for shortness of breath  Gastrointestinal: Negative for abdominal pain, diarrhea, nausea and vomiting  Genitourinary: Negative for dysuria  Musculoskeletal: Positive for myalgias  Neurological: Positive for headaches  Objective: There were no vitals filed for this visit  Physical Exam  Constitutional:       General: She is not in acute distress  Appearance: Normal appearance  She is not ill-appearing or toxic-appearing  HENT:      Nose: Congestion present  Mouth/Throat:      Mouth: Mucous membranes are moist    Eyes:      Extraocular Movements: Extraocular movements intact  Conjunctiva/sclera: Conjunctivae normal    Neck:      Musculoskeletal: Normal range of motion and neck supple  Pulmonary:      Effort: Pulmonary effort is normal       Breath sounds: Normal breath sounds  Skin:     Coloration: Skin is not pale  Findings: No erythema  Neurological:      Mental Status: She is alert  Cranial Nerves: No dysarthria or facial asymmetry  VIRTUAL VISIT DISCLAIMER    Magnolia Pool acknowledges that she has consented to an online visit or consultation  She understands that the online visit is based solely on information provided by her, and that, in the absence of a face-to-face physical evaluation by the physician, the diagnosis she receives is both limited and provisional in terms of accuracy and completeness  This is not intended to replace a full medical face-to-face evaluation by the physician  Magnolia Pool understands and accepts these terms

## 2021-03-30 ENCOUNTER — TELEPHONE (OUTPATIENT)
Dept: NEUROLOGY | Facility: CLINIC | Age: 56
End: 2021-03-30

## 2021-03-30 NOTE — TELEPHONE ENCOUNTER
Received FMLA forms through the mail to be filled out by [employer]  Forms are scanned into this encounter  Charges have been dropped and tried calling patient to collect payment, unable to leave message because voicemail box has not been set up

## 2021-03-31 ENCOUNTER — TELEPHONE (OUTPATIENT)
Dept: FAMILY MEDICINE CLINIC | Facility: CLINIC | Age: 56
End: 2021-03-31

## 2021-03-31 LAB — SARS-COV-2 RNA RESP QL NAA+PROBE: POSITIVE

## 2021-03-31 NOTE — TELEPHONE ENCOUNTER
Spoke with patient in regards to covid-19 symptoms  She said she feels worse today then yesterday  She has no appetite, nausea, feeling dehydrated, even though she is drinking water, tired and chest feels very dry  I made patient an appt with PCP for tomorrow and instructed her to go to the ER today and/or tonight if symptoms worsen

## 2021-04-01 ENCOUNTER — TELEMEDICINE (OUTPATIENT)
Dept: FAMILY MEDICINE CLINIC | Facility: CLINIC | Age: 56
End: 2021-04-01
Payer: COMMERCIAL

## 2021-04-01 DIAGNOSIS — U07.1 INFECTION DUE TO SEVERE ACUTE RESPIRATORY SYNDROME CORONAVIRUS 2 (SARS-COV-2): Primary | ICD-10-CM

## 2021-04-01 PROCEDURE — 99213 OFFICE O/P EST LOW 20 MIN: CPT | Performed by: FAMILY MEDICINE

## 2021-04-01 NOTE — PROGRESS NOTES
COVID-19 Outpatient Progress Note    Assessment/Plan:    Problem List Items Addressed This Visit     None      Visit Diagnoses     Infection due to severe acute respiratory syndrome coronavirus 2 (SARS-CoV-2)    -  Primary         Disposition:     I recommended continued isolation until at least 24 hours have passed since recovery defined as resolution of fever without the use of fever-reducing medications AND improvement in COVID symptoms AND 10 days have passed since onset of symptoms (or 10 days have passed since date of first positive viral diagnostic test for asymptomatic patients)  Reviewed guidelines for isolation  Recommended supportive care  Reviewed return and Er precautions  Will plan to follow-up on Monday to determine if patient can return to work there after  I have spent 12 minutes directly with the patient  Greater than 50% of this time was spent in counseling/coordination of care regarding: instructions for management, patient and family education and impressions  Encounter provider Kelli Orantes MD    Provider located at [place] [address]  [phone]    Recent Visits  Date Type Provider Dept   03/31/21 Telephone Charles Stanley   03/29/21 [facility] MD Jim Flynn   Showing recent visits within past 7 days and meeting all other requirements     Today's Visits  Date Type Provider Dept   04/01/21 129 MD Jim Bob   Showing today's visits and meeting all other requirements     Future Appointments  No visits were found meeting these conditions  Showing future appointments within next 150 days and meeting all other requirements      This virtual check-in was done via [employer] and patient was informed that this is a secure, HIPAA-compliant platform  She agrees to proceed      Patient agrees to participate in a virtual check in via telephone or video visit instead of presenting to the office to address urgent/immediate medical needs  Patient is aware this is a billable service  After connecting through [facility], the patient was identified by name and date of birth  Blake Linda was informed that this was a telemedicine visit and that the exam was being conducted confidentially over secure lines  My office door was closed  No one else was in the room  Blake Linda acknowledged consent and understanding of privacy and security of the telemedicine visit  I informed the patient that I have reviewed her record in Epic and presented the opportunity for her to ask any questions regarding the visit today  The patient agreed to participate  Subjective:   Blake Linda is a 54 y o  female who has been screened for COVID-19  Symptom change since last report: worsening  Patient's symptoms include fever, chills, fatigue, malaise, nasal congestion, rhinorrhea, cough, chest tightness, abdominal pain and myalgias  Patient denies sore throat, anosmia, loss of taste, shortness of breath, nausea, vomiting, diarrhea and headaches  Date of symptom onset: 3/26/2021  Date of positive COVID-19 PCR: 3/29/2021    She reports that she is feeling worse than she was previously  She is very tired  She has a lot of congestion  She cough when she tries to take a deep breath  She has not been taking any medications       Lab Results   Component Value Date    SARSCOV2 Positive (A) 03/29/2021    SARSCOV2 Not Detected 05/18/2020     Past Medical History:   Diagnosis Date    Abnormal Pap smear of cervix 1989    all normal since    Asthma     Carbon monoxide exposure 2/6/2018    Chronic back pain     Lump of skin     last assessed 11/21/13    Migraine     Sinus bradycardia     last assessed 10/25/16    Varicella      Past Surgical History:   Procedure Laterality Date    APPENDECTOMY      BREAST CYST EXCISION Right 2001    benign    COLONOSCOPY      TONSILLECTOMY      VARICOSE VEIN SURGERY      WISDOM TOOTH EXTRACTION Bilateral      Current Outpatient Medications   Medication Sig Dispense Refill    Ascorbic Acid (CEDRICK-C PO) Take by mouth      aspirin 81 mg chewable tablet Chew 81 mg daily      cetirizine (ZyrTEC) 10 mg tablet Take 1 tablet (10 mg total) by mouth daily 90 tablet 1    Cyanocobalamin (B-12) 5000 MCG SUBL Place 5,000 mcg under the tongue daily      fluocinonide (LIDEX) 0 05 % cream       fluticasone (FLONASE) 50 mcg/act nasal spray 2 sprays into each nostril daily 16 mL 1    fremanezumab-vfrm (Ajovy) 225 MG/1 5ML prefilled syringe 1 subcu injection q30 days  3 Syringe 4    gabapentin (NEURONTIN) 100 mg capsule TAKE 1 CAPSULE BY MOUTH IN THE MORNING, 1 CAPSULE AT NOON AND 2 CAPSULES AT NIGHT TIME  (Patient taking differently: 1 capsule in afternoon and 2 capsules at night) 120 capsule 5    Iodine, Kelp, (KELP PO) Take 1 tablet by mouth daily      ketorolac (TORADOL) 10 mg tablet 1 tab q6 hours prn migraine (with compazine if needed)  Max 2 per day and 3 per week   10 tablet 0    losartan (COZAAR) 50 mg tablet Take 0 5 tab PO daily 90 tablet 1    Magnesium 200 MG TABS Take 200 mg by mouth 2 (two) times a day      meclizine (ANTIVERT) 12 5 MG tablet 1 tab qhs prn dizziness and up to TID as needed 30 tablet 0    MULTIPLE VITAMINS ESSENTIAL PO Take by mouth      nortriptyline (PAMELOR) 10 mg capsule TAKE 2 TABLETS BY MOUTH AT BEDTIME (Patient not taking: Reported on 3/12/2021) 60 capsule 5    OLANZapine (ZyPREXA) 5 mg tablet Take 1 tablet (5 mg total) by mouth daily at bedtime for 5 days 5 tablet 0    OLANZapine (ZyPREXA) 5 mg tablet Take 1 tablet (5 mg total) by mouth daily at bedtime 30 tablet 0    prochlorperazine (COMPAZINE) 10 mg tablet Take 1 tablet (10 mg total) by mouth every 6 (six) hours as needed for nausea or vomiting 20 tablet 0    topiramate (TOPAMAX) 25 mg tablet Take 2 TABLETS AT BEDTIME 60 tablet 5    venlafaxine (EFFEXOR-XR) 150 mg 24 hr capsule TAKE 1 CAPSULE BY MOUTH EVERY DAY 90 capsule 3     No current facility-administered medications for this visit  Allergies   Allergen Reactions    Penicillins     Doxycycline     Erythromycin     Other      Mushrooms       Review of Systems   Constitutional: Positive for chills, fatigue and fever  HENT: Positive for congestion and rhinorrhea  Negative for sore throat  Respiratory: Positive for cough and chest tightness  Negative for shortness of breath  Gastrointestinal: Positive for abdominal pain  Negative for diarrhea, nausea and vomiting  Musculoskeletal: Positive for myalgias  Neurological: Negative for headaches  Objective: There were no vitals filed for this visit  Physical Exam  Constitutional:       Appearance: She is ill-appearing (appears to be feeling unwell)  HENT:      Nose: Congestion present  Mouth/Throat:      Mouth: Mucous membranes are moist    Neck:      Musculoskeletal: Normal range of motion  Pulmonary:      Effort: Pulmonary effort is normal  No respiratory distress  Comments: Able to speak in complete sentences without difficulty  Neurological:      Mental Status: She is alert  Cranial Nerves: No dysarthria or facial asymmetry  VIRTUAL VISIT DISCLAIMER    Alfonzo Feliciano acknowledges that she has consented to an online visit or consultation  She understands that the online visit is based solely on information provided by her, and that, in the absence of a face-to-face physical evaluation by the physician, the diagnosis she receives is both limited and provisional in terms of accuracy and completeness  This is not intended to replace a full medical face-to-face evaluation by the physician  Alfonzo Feliciano understands and accepts these terms

## 2021-04-05 ENCOUNTER — PATIENT MESSAGE (OUTPATIENT)
Dept: FAMILY MEDICINE CLINIC | Facility: CLINIC | Age: 56
End: 2021-04-05

## 2021-04-06 ENCOUNTER — TELEMEDICINE (OUTPATIENT)
Dept: FAMILY MEDICINE CLINIC | Facility: CLINIC | Age: 56
End: 2021-04-06
Payer: COMMERCIAL

## 2021-04-06 ENCOUNTER — PATIENT MESSAGE (OUTPATIENT)
Dept: NEUROLOGY | Facility: CLINIC | Age: 56
End: 2021-04-06

## 2021-04-06 DIAGNOSIS — U07.1 INFECTION DUE TO SEVERE ACUTE RESPIRATORY SYNDROME CORONAVIRUS 2 (SARS-COV-2): Primary | ICD-10-CM

## 2021-04-06 DIAGNOSIS — J01.10 ACUTE NON-RECURRENT FRONTAL SINUSITIS: ICD-10-CM

## 2021-04-06 PROCEDURE — 99213 OFFICE O/P EST LOW 20 MIN: CPT | Performed by: PHYSICIAN ASSISTANT

## 2021-04-06 RX ORDER — IPRATROPIUM BROMIDE 21 UG/1
2 SPRAY, METERED NASAL EVERY 12 HOURS
Qty: 30 ML | Refills: 1 | Status: SHIPPED | OUTPATIENT
Start: 2021-04-06 | End: 2021-08-23 | Stop reason: SDUPTHER

## 2021-04-06 NOTE — PROGRESS NOTES
COVID-19 Outpatient Progress Note    Assessment/Plan:    Problem List Items Addressed This Visit     None      Visit Diagnoses     Infection due to severe acute respiratory syndrome coronavirus 2 (SARS-CoV-2)    -  Primary    Relevant Medications    ipratropium (ATROVENT) 0 03 % nasal spray    Acute non-recurrent frontal sinusitis        Relevant Medications    ipratropium (ATROVENT) 0 03 % nasal spray      Ordered Atrovent nasal spray at request  Will provide work note     Disposition:     I recommended continued isolation until at least 24 hours have passed since recovery defined as resolution of fever without the use of fever-reducing medications AND improvement in COVID symptoms AND 10 days have passed since onset of symptoms (or 10 days have passed since date of first positive viral diagnostic test for asymptomatic patients)  I have spent 8 minutes directly with the patient  Encounter provider Daniel Liriano PA-C    Provider located at [place] [address]  [phone]    Recent Visits  Date Type Provider Dept   04/01/21 [address] [place],  [facility]   03/31/21 Telephone Colt RODRIGUEZ [facility] recent visits within past 7 days and meeting all other requirements     Future Appointments  No visits were found meeting these conditions  Showing future appointments within next 150 days and meeting all other requirements        Patient agrees to participate in a virtual check in via telephone or video visit instead of presenting to the office to address urgent/immediate medical needs  Patient is aware this is a billable service  After connecting through [facility], the patient was identified by name and date of birth  Orin Kirk was informed that this was a telemedicine visit and that the exam was being conducted confidentially over secure lines  My office door was closed  No one else was in the room  Nicko Way acknowledged consent and understanding of privacy and security of the telemedicine visit  I informed the patient that I have reviewed her record in Epic and presented the opportunity for her to ask any questions regarding the visit today  The patient agreed to participate  Subjective:   Nicko Way is a 54 y o  female who has been screened for COVID-19  Symptom change since last report: improving  Patient's symptoms include nasal congestion, rhinorrhea, shortness of breath and headache  Patient denies fever, fatigue, sore throat, cough, nausea, diarrhea and myalgias  Inna Coffey has been staying home and has isolated themselves in her home  She is taking care to not share personal items and is cleaning all surfaces that are touched often, like counters, tabletops, and doorknobs using household cleaning sprays or wipes  She is wearing a mask when she leaves her room  Date of symptom onset: 3/26/2021  Date of positive COVID-19 PCR: 3/29/2021    Requests Atrovent nasal spray which has improved nasal congestion symptoms before      Lab Results   Component Value Date    SARSCOV2 Positive (A) 03/29/2021    SARSCOV2 Not Detected 05/18/2020     Past Medical History:   Diagnosis Date    Abnormal Pap smear of cervix 1989    all normal since    Asthma     Carbon monoxide exposure 2/6/2018    Chronic back pain     Lump of skin     last assessed 11/21/13    Migraine     Sinus bradycardia     last assessed 10/25/16    Varicella      Past Surgical History:   Procedure Laterality Date    APPENDECTOMY      BREAST CYST EXCISION Right 2001    benign    COLONOSCOPY      TONSILLECTOMY      VARICOSE VEIN SURGERY      WISDOM TOOTH EXTRACTION Bilateral      Current Outpatient Medications   Medication Sig Dispense Refill    Ascorbic Acid (CEDRICK-C PO) Take by mouth      aspirin 81 mg chewable tablet Chew 81 mg daily      cetirizine (ZyrTEC) 10 mg tablet Take 1 tablet (10 mg total) by mouth daily 90 tablet 1    Cyanocobalamin (B-12) 5000 MCG SUBL Place 5,000 mcg under the tongue daily      fluocinonide (LIDEX) 0 05 % cream       fluticasone (FLONASE) 50 mcg/act nasal spray 2 sprays into each nostril daily 16 mL 1    fremanezumab-vfrm (Ajovy) 225 MG/1 5ML prefilled syringe 1 subcu injection q30 days  3 Syringe 4    gabapentin (NEURONTIN) 100 mg capsule TAKE 1 CAPSULE BY MOUTH IN THE MORNING, 1 CAPSULE AT NOON AND 2 CAPSULES AT NIGHT TIME  (Patient taking differently: 1 capsule in afternoon and 2 capsules at night) 120 capsule 5    Iodine, Kelp, (KELP PO) Take 1 tablet by mouth daily      ipratropium (ATROVENT) 0 03 % nasal spray 2 sprays into each nostril every 12 (twelve) hours 30 mL 1    ketorolac (TORADOL) 10 mg tablet 1 tab q6 hours prn migraine (with compazine if needed)  Max 2 per day and 3 per week  10 tablet 0    losartan (COZAAR) 50 mg tablet Take 0 5 tab PO daily 90 tablet 1    Magnesium 200 MG TABS Take 200 mg by mouth 2 (two) times a day      meclizine (ANTIVERT) 12 5 MG tablet 1 tab qhs prn dizziness and up to TID as needed 30 tablet 0    MULTIPLE VITAMINS ESSENTIAL PO Take by mouth      nortriptyline (PAMELOR) 10 mg capsule TAKE 2 TABLETS BY MOUTH AT BEDTIME (Patient not taking: Reported on 3/12/2021) 60 capsule 5    OLANZapine (ZyPREXA) 5 mg tablet Take 1 tablet (5 mg total) by mouth daily at bedtime for 5 days 5 tablet 0    OLANZapine (ZyPREXA) 5 mg tablet Take 1 tablet (5 mg total) by mouth daily at bedtime 30 tablet 0    prochlorperazine (COMPAZINE) 10 mg tablet Take 1 tablet (10 mg total) by mouth every 6 (six) hours as needed for nausea or vomiting 20 tablet 0    topiramate (TOPAMAX) 25 mg tablet Take 2 TABLETS AT BEDTIME 60 tablet 5    venlafaxine (EFFEXOR-XR) 150 mg 24 hr capsule TAKE 1 CAPSULE BY MOUTH EVERY DAY 90 capsule 3     No current facility-administered medications for this visit        Allergies   Allergen Reactions    Penicillins     Doxycycline     Erythromycin     Other      Mushrooms       Review of Systems   Constitutional: Negative for fatigue, fever and unexpected weight change  HENT: Positive for congestion and rhinorrhea  Negative for sore throat  Respiratory: Positive for shortness of breath  Negative for cough and wheezing  Cardiovascular: Negative for chest pain, palpitations and leg swelling  Gastrointestinal: Negative for constipation, diarrhea and nausea  Musculoskeletal: Negative for arthralgias and myalgias  Neurological: Positive for headaches  Objective: There were no vitals filed for this visit  Physical Exam  Constitutional:       General: She is not in acute distress  Appearance: She is well-developed  She is not diaphoretic  HENT:      Head: Normocephalic  Pulmonary:      Effort: Pulmonary effort is normal    Neurological:      Mental Status: She is alert  Psychiatric:         Behavior: Behavior normal          Thought Content: Thought content normal          Judgment: Judgment normal        VIRTUAL VISIT DISCLAIMER    Nitin Fleming acknowledges that she has consented to an online visit or consultation  She understands that the online visit is based solely on information provided by her, and that, in the absence of a face-to-face physical evaluation by the physician, the diagnosis she receives is both limited and provisional in terms of accuracy and completeness  This is not intended to replace a full medical face-to-face evaluation by the physician  Nitin Fleming understands and accepts these terms

## 2021-04-06 NOTE — TELEPHONE ENCOUNTER
Patients FMLA forms are completed and were faxed to [phone] per patients request      Patient states she wants a copy of her FMLA papers, and is asking office to give her a copy of FMLA papers at her next office visit

## 2021-04-25 DIAGNOSIS — G43.709 CHRONIC MIGRAINE WITHOUT AURA WITHOUT STATUS MIGRAINOSUS, NOT INTRACTABLE: ICD-10-CM

## 2021-04-25 RX ORDER — TOPIRAMATE 25 MG/1
TABLET ORAL
Qty: 60 TABLET | Refills: 5 | Status: SHIPPED | OUTPATIENT
Start: 2021-04-25 | End: 2021-08-24 | Stop reason: SDUPTHER

## 2021-05-23 DIAGNOSIS — R09.82 PND (POST-NASAL DRIP): ICD-10-CM

## 2021-05-23 RX ORDER — FLUTICASONE PROPIONATE 50 MCG
SPRAY, SUSPENSION (ML) NASAL
Qty: 16 ML | Refills: 1 | OUTPATIENT
Start: 2021-05-23

## 2021-06-20 ENCOUNTER — OFFICE VISIT (OUTPATIENT)
Dept: URGENT CARE | Facility: MEDICAL CENTER | Age: 56
End: 2021-06-20
Payer: COMMERCIAL

## 2021-06-20 ENCOUNTER — APPOINTMENT (OUTPATIENT)
Dept: RADIOLOGY | Facility: MEDICAL CENTER | Age: 56
End: 2021-06-20
Payer: COMMERCIAL

## 2021-06-20 VITALS
BODY MASS INDEX: 26.68 KG/M2 | HEART RATE: 60 BPM | SYSTOLIC BLOOD PRESSURE: 148 MMHG | OXYGEN SATURATION: 98 % | WEIGHT: 170 LBS | TEMPERATURE: 98 F | DIASTOLIC BLOOD PRESSURE: 69 MMHG | HEIGHT: 67 IN | RESPIRATION RATE: 18 BRPM

## 2021-06-20 DIAGNOSIS — S99.912A INJURY OF LEFT ANKLE, INITIAL ENCOUNTER: Primary | ICD-10-CM

## 2021-06-20 DIAGNOSIS — S99.912A INJURY OF LEFT ANKLE, INITIAL ENCOUNTER: ICD-10-CM

## 2021-06-20 PROCEDURE — 73630 X-RAY EXAM OF FOOT: CPT

## 2021-06-20 PROCEDURE — 73610 X-RAY EXAM OF ANKLE: CPT

## 2021-06-20 PROCEDURE — 99213 OFFICE O/P EST LOW 20 MIN: CPT | Performed by: PHYSICIAN ASSISTANT

## 2021-06-20 NOTE — PROGRESS NOTES
[facility]        NAME: Jennifer Patel is a 54 y o  female  : 1965    MRN: [id number]  DATE: 2021  TIME: 1:21 PM    Assessment and Plan   Injury of left ankle, initial encounter [A89 912A]  1  Injury of left ankle, initial encounter  XR ankle 3+ vw left    XR foot 3+ vw left       X-ray shows no acute abnormalities  Recommended RICE and Tylenol or Motrin for pain  Patient Instructions     Tylenol or Motrin for pain   RICE  Follow up with PCP in 3-5 days  Proceed to  ER if symptoms worsen  Chief Complaint     Chief Complaint   Patient presents with    Ankle Pain     Pt  rolled her lefy ankle earlier  States that she heard a pop  History of Present Illness         Patient is a 70-year-old female who presents today with complaints of left ankle and lateral foot pain after twisting her ankle today  Patient inverted her ankle while wearing tall shoes  Denies any significant swelling and is able to bear weight but only on the inside of the foot  Does report some numbness and tingling in the toes  Pain goes up the back of her calf as well  Review of Systems   Review of Systems   Constitutional: Negative for fever  Musculoskeletal: Positive for arthralgias and myalgias  Negative for joint swelling  Skin: Negative for color change and wound  Neurological: Positive for numbness  Current Medications       Current Outpatient Medications:     Ascorbic Acid (CEDRICK-C PO), Take by mouth, Disp: , Rfl:     aspirin 81 mg chewable tablet, Chew 81 mg daily, Disp: , Rfl:     cetirizine (ZyrTEC) 10 mg tablet, Take 1 tablet (10 mg total) by mouth daily, Disp: 90 tablet, Rfl: 1    Cyanocobalamin (B-12) 5000 MCG SUBL, Place 5,000 mcg under the tongue daily, Disp: , Rfl:     fluticasone (FLONASE) 50 mcg/act nasal spray, 2 sprays into each nostril daily, Disp: 16 mL, Rfl: 1    fremanezumab-vfrm (Ajovy) 225 MG/1 5ML prefilled syringe, 1 subcu injection q30 days  , Disp: 3 Syringe, Rfl: 4    Iodine, Kelp, (KELP PO), Take 1 tablet by mouth daily, Disp: , Rfl:     ipratropium (ATROVENT) 0 03 % nasal spray, 2 sprays into each nostril every 12 (twelve) hours, Disp: 30 mL, Rfl: 1    ketorolac (TORADOL) 10 mg tablet, 1 tab q6 hours prn migraine (with compazine if needed)  Max 2 per day and 3 per week , Disp: 10 tablet, Rfl: 0    Magnesium 200 MG TABS, Take 200 mg by mouth 2 (two) times a day, Disp: , Rfl:     meclizine (ANTIVERT) 12 5 MG tablet, 1 tab qhs prn dizziness and up to TID as needed, Disp: 30 tablet, Rfl: 0    MULTIPLE VITAMINS ESSENTIAL PO, Take by mouth, Disp: , Rfl:     OLANZapine (ZyPREXA) 5 mg tablet, Take 1 tablet (5 mg total) by mouth daily at bedtime, Disp: 30 tablet, Rfl: 0    prochlorperazine (COMPAZINE) 10 mg tablet, Take 1 tablet (10 mg total) by mouth every 6 (six) hours as needed for nausea or vomiting, Disp: 20 tablet, Rfl: 0    topiramate (TOPAMAX) 25 mg tablet, TAKE 2 TABLETS BY MOUTH EVERY DAY AT BEDTIME, Disp: 60 tablet, Rfl: 5    venlafaxine (EFFEXOR-XR) 150 mg 24 hr capsule, TAKE 1 CAPSULE BY MOUTH EVERY DAY, Disp: 90 capsule, Rfl: 3    fluocinonide (LIDEX) 0 05 % cream, , Disp: , Rfl:     gabapentin (NEURONTIN) 100 mg capsule, TAKE 1 CAPSULE BY MOUTH IN THE MORNING, 1 CAPSULE AT NOON AND 2 CAPSULES AT NIGHT TIME   (Patient not taking: Reported on 6/20/2021), Disp: 120 capsule, Rfl: 5    losartan (COZAAR) 50 mg tablet, Take 0 5 tab PO daily (Patient not taking: Reported on 6/20/2021), Disp: 90 tablet, Rfl: 1    nortriptyline (PAMELOR) 10 mg capsule, TAKE 2 TABLETS BY MOUTH AT BEDTIME (Patient not taking: Reported on 3/12/2021), Disp: 60 capsule, Rfl: 5    OLANZapine (ZyPREXA) 5 mg tablet, Take 1 tablet (5 mg total) by mouth daily at bedtime for 5 days, Disp: 5 tablet, Rfl: 0    Current Allergies     Allergies as of 06/20/2021 - Reviewed 06/20/2021   Allergen Reaction Noted    Penicillins  12/04/2016    Doxycycline  12/04/2016    Erythromycin 12/04/2016    Other  12/04/2016            The following portions of the patient's history were reviewed and updated as appropriate: allergies, current medications, past family history, past medical history, past social history, past surgical history and problem list      Past Medical History:   Diagnosis Date    Abnormal Pap smear of cervix 1989    all normal since    Asthma     Carbon monoxide exposure 2/6/2018    Chronic back pain     Lump of skin     last assessed 11/21/13    Migraine     Sinus bradycardia     last assessed 10/25/16    Varicella        Past Surgical History:   Procedure Laterality Date    APPENDECTOMY      BREAST CYST EXCISION Right 2001    benign    COLONOSCOPY      TONSILLECTOMY      VARICOSE VEIN SURGERY      WISDOM TOOTH EXTRACTION Bilateral        Family History   Problem Relation Age of Onset    Heart failure Mother         CHF    Heart attack Father     Colon cancer Father 48    Hypertension Brother     Stroke Brother     Diabetes Maternal Aunt     Stroke Maternal Aunt     Arthritis Family     Heart disease Sister     No Known Problems Maternal Grandmother     Lung cancer Maternal Grandfather     No Known Problems Paternal Grandmother     No Known Problems Paternal Grandfather     No Known Problems Son     No Known Problems Son     No Known Problems Son     No Known Problems Sister     No Known Problems Paternal Aunt     No Known Problems Paternal Aunt     No Known Problems Paternal Aunt     Breast cancer Neg Hx          Medications have been verified  Objective   /69   Pulse 60   Temp 98 °F (36 7 °C)   Resp 18   Ht 5' 7" (1 702 m)   Wt 77 1 kg (170 lb)   SpO2 98%   BMI 26 63 kg/m²        Physical Exam     Physical Exam  Constitutional:       General: She is not in acute distress  Appearance: Normal appearance  She is normal weight  She is not ill-appearing  Cardiovascular:      Rate and Rhythm: Normal rate and regular rhythm  Pulmonary:      Effort: Pulmonary effort is normal    Musculoskeletal:         General: Tenderness present  No swelling, deformity or signs of injury  Left ankle: No swelling or deformity  Tenderness present over the lateral malleolus and base of 5th metatarsal  Decreased range of motion  Left Achilles Tendon: No tenderness  Left foot: Decreased range of motion  Tenderness present  No swelling or deformity  Skin:     General: Skin is warm and dry  Neurological:      Mental Status: She is alert

## 2021-07-12 ENCOUNTER — ANNUAL EXAM (OUTPATIENT)
Dept: OBGYN CLINIC | Facility: MEDICAL CENTER | Age: 56
End: 2021-07-12
Payer: COMMERCIAL

## 2021-07-12 VITALS
BODY MASS INDEX: 27.18 KG/M2 | WEIGHT: 173.2 LBS | HEIGHT: 67 IN | SYSTOLIC BLOOD PRESSURE: 142 MMHG | DIASTOLIC BLOOD PRESSURE: 90 MMHG

## 2021-07-12 DIAGNOSIS — Z01.419 ENCOUNTER FOR GYNECOLOGICAL EXAMINATION (GENERAL) (ROUTINE) WITHOUT ABNORMAL FINDINGS: ICD-10-CM

## 2021-07-12 DIAGNOSIS — Z12.31 ENCOUNTER FOR SCREENING MAMMOGRAM FOR MALIGNANT NEOPLASM OF BREAST: Primary | ICD-10-CM

## 2021-07-12 PROCEDURE — S0612 ANNUAL GYNECOLOGICAL EXAMINA: HCPCS | Performed by: NURSE PRACTITIONER

## 2021-07-12 NOTE — ASSESSMENT & PLAN NOTE
Benign findings on routine gyn exam  Recommended monthly SBE, annual CBE and annual screening mammo  ASCCP guidelines reviewed and pap with cotesting noted to be up to date; this low risk patient was advised she meets criteria to d/c pap screening at age 72  Cologuard done 2019 and normal  The patient denies STI risk factors and declines testing at this time  Reviewed diet/activity recommendations:  Encouraged daily Ca++ and vitamin D intake as well as daily weight bearing exercise for promotion of bone health    Discussed postmenopausal considerations and symptoms to report  RTO in one year for routine annual gyn exam or sooner PRN

## 2021-07-12 NOTE — PROGRESS NOTES
Encounter for gynecological examination (general) (routine) without abnormal findings    Benign findings on routine gyn exam  Recommended monthly SBE, annual CBE and annual screening mammo  ASCCP guidelines reviewed and pap with cotesting noted to be up to date; this low risk patient was advised she meets criteria to d/c pap screening at age 72  Cologuard done 2019 and normal  The patient denies STI risk factors and declines testing at this time  Reviewed diet/activity recommendations:  Encouraged daily Ca++ and vitamin D intake as well as daily weight bearing exercise for promotion of bone health    Discussed postmenopausal considerations and symptoms to report  RTO in one year for routine annual gyn exam or sooner PRN  Diagnoses and all orders for this visit:    Encounter for screening mammogram for malignant neoplasm of breast  -     Mammo screening bilateral w 3d & cad; Future    Encounter for gynecological examination (general) (routine) without abnormal findings         Ayde Cortez   1965    CC:  Yearly exam    S:  Ayde Cortez is a 54 y o  female here for yearly exam  She is postmenopausal and post ablation in 2008 and has had no vaginal bleeding  She denies abnormal vaginal discharge, itching, odor or dryness  She denies breast concerns, abdominal/pelvic pain or bladder/bowel dysfunction  Denies stress incontinence but does have some hot flashes improved with black Cohosh         Sexual activity: She is sexually active without pain, bleeding or dryness  Her BP was elevated today and she has hx of HTN  Recently started new med and followed by PCP    STD testing: She does not want STD testing today  Last Pap: 3/19 neg/neg   Last Mammo: 10/20 right asymmetry -dx mammo and U/S showed cyst-can return to annual screening-BOUBACAR 2  SBE: sometimes   Last Colonoscopy: Cologuard 2019    We reviewed ASCCP guidelines for Pap testing       Family hx of breast cancer: rudy  Family hx of ovarian cancer: denies  Family hx of colon cancer: dad @ 48     She has 3 children       Current Outpatient Medications:     Ascorbic Acid (CEDRICK-C PO), Take by mouth, Disp: , Rfl:     aspirin 81 mg chewable tablet, Chew 81 mg daily, Disp: , Rfl:     cetirizine (ZyrTEC) 10 mg tablet, Take 1 tablet (10 mg total) by mouth daily, Disp: 90 tablet, Rfl: 1    fluticasone (FLONASE) 50 mcg/act nasal spray, 2 sprays into each nostril daily, Disp: 16 mL, Rfl: 1    fremanezumab-vfrm (Ajovy) 225 MG/1 5ML prefilled syringe, 1 subcu injection q30 days  , Disp: 3 Syringe, Rfl: 4    Iodine, Kelp, (KELP PO), Take 1 tablet by mouth daily, Disp: , Rfl:     ipratropium (ATROVENT) 0 03 % nasal spray, 2 sprays into each nostril every 12 (twelve) hours, Disp: 30 mL, Rfl: 1    ketorolac (TORADOL) 10 mg tablet, 1 tab q6 hours prn migraine (with compazine if needed)   Max 2 per day and 3 per week , Disp: 10 tablet, Rfl: 0    Magnesium 200 MG TABS, Take 200 mg by mouth 2 (two) times a day, Disp: , Rfl:     meclizine (ANTIVERT) 12 5 MG tablet, 1 tab qhs prn dizziness and up to TID as needed, Disp: 30 tablet, Rfl: 0    MULTIPLE VITAMINS ESSENTIAL PO, Take by mouth, Disp: , Rfl:     prochlorperazine (COMPAZINE) 10 mg tablet, Take 1 tablet (10 mg total) by mouth every 6 (six) hours as needed for nausea or vomiting, Disp: 20 tablet, Rfl: 0    topiramate (TOPAMAX) 25 mg tablet, TAKE 2 TABLETS BY MOUTH EVERY DAY AT BEDTIME, Disp: 60 tablet, Rfl: 5    venlafaxine (EFFEXOR-XR) 150 mg 24 hr capsule, TAKE 1 CAPSULE BY MOUTH EVERY DAY, Disp: 90 capsule, Rfl: 3    nortriptyline (PAMELOR) 10 mg capsule, TAKE 2 TABLETS BY MOUTH AT BEDTIME (Patient not taking: Reported on 3/12/2021), Disp: 60 capsule, Rfl: 5    OLANZapine (ZyPREXA) 5 mg tablet, Take 1 tablet (5 mg total) by mouth daily at bedtime for 5 days, Disp: 5 tablet, Rfl: 0  Social History     Socioeconomic History    Marital status: /Civil Union     Spouse name: Not on file    Number of children: Not on file    Years of education: Not on file    Highest education level: Not on file   Occupational History    Not on file   Tobacco Use    Smoking status: Former Smoker     Quit date:      Years since quittin     Smokeless tobacco: Never Used   Substance and Sexual Activity    Alcohol use: Yes     Alcohol/week: 7 0 standard drinks     Types: 7 Glasses of wine per week     Comment: social per [employer]    Drug use: No    Sexual activity: Yes     Partners: Male   Other Topics Concern    Not on file   Social History Narrative    Caffeine use    Exercises 6 times a week     Social Determinants of Health     Financial Resource Strain:     Difficulty of Paying Living Expenses:    Food Insecurity:     Worried About Running Out of Food in the Last Year:     Ran Out of Food in the Last Year:    Transportation Needs:     Lack of Transportation (Medical):      Lack of Transportation (Non-Medical):    Physical Activity:     Days of Exercise per Week:     Minutes of Exercise per Session:    Stress:     Feeling of Stress :    Social Connections:     Frequency of Communication with Friends and Family:     Frequency of Social Gatherings with Friends and Family:     Attends Oriental orthodox Services:     Active Member of Clubs or Organizations:     Attends Club or Organization Meetings:     Marital Status:    Intimate Partner Violence:     Fear of Current or Ex-Partner:     Emotionally Abused:     Physically Abused:     Sexually Abused:      Family History   Problem Relation Age of Onset    Heart failure Mother         CHF    Heart attack Father     Colon cancer Father 48    Heart disease Sister     No Known Problems Sister     Hypertension Brother     Stroke Brother     No Known Problems Maternal Grandmother     Lung cancer Maternal Grandfather     No Known Problems Paternal Grandmother     No Known Problems Paternal Grandfather     No Known Problems Son     No Known Problems Son     No Known Problems Son     Diabetes Maternal Aunt     Stroke Maternal Aunt     No Known Problems Paternal Aunt     No Known Problems Paternal Aunt     No Known Problems Paternal Aunt     Arthritis Family     Breast cancer Neg Hx      Past Medical History:   Diagnosis Date    Abnormal Pap smear of cervix 1989    all normal since    Asthma     Carbon monoxide exposure 2/6/2018    Chronic back pain     Lump of skin     last assessed 11/21/13    Migraine     Sinus bradycardia     last assessed 10/25/16    Varicella         Review of Systems   Constitutional: Negative for appetite change, fatigue and unexpected weight change  Respiratory: Negative for shortness of breath  Cardiovascular: Negative for chest pain and leg swelling  Gastrointestinal: Negative for abdominal pain  Endocrine: Negative for cold intolerance and heat intolerance  + for hot flashes   Breasts:  Negative for breast tenderness or masses  Genitourinary: Negative for dyspareunia, dysuria, flank pain, frequency, genital sores, hematuria and pelvic pain  Negative for stress incontinence  Musculoskeletal: Negative for back pain  Neurological: Negative for headaches  O:  Blood pressure 142/90, height 5' 7" (1 702 m), weight 78 6 kg (173 lb 3 2 oz), not currently breastfeeding  Patient appears well and is not in distress  Neck is supple without masses  Normal thyroid  Heart regular rate and rhythm  Lungs CTA bilaterally   Breasts are symmetrical without mass, tenderness, nipple discharge, skin changes or adenopathy  Abdomen is soft and nontender without masses  External genitals are normal without lesions or rashes  Urethral meatus and urethra are normal  Bladder is normal to palpation  Vagina is normal without discharge or bleeding    + atrophic changes noted   Cervix is normal without discharge or lesion  Uterus is normal, mobile, nontender without palpable mass    Adnexa are normal, nontender, without palpable mass     Skin warm and dry   Capillary refill < 2 seconds  Alert and oriented x 3 with normal affect

## 2021-08-23 ENCOUNTER — OFFICE VISIT (OUTPATIENT)
Dept: FAMILY MEDICINE CLINIC | Facility: CLINIC | Age: 56
End: 2021-08-23
Payer: COMMERCIAL

## 2021-08-23 VITALS
HEART RATE: 60 BPM | BODY MASS INDEX: 26.68 KG/M2 | SYSTOLIC BLOOD PRESSURE: 122 MMHG | TEMPERATURE: 98.3 F | WEIGHT: 170 LBS | DIASTOLIC BLOOD PRESSURE: 70 MMHG | RESPIRATION RATE: 16 BRPM | HEIGHT: 67 IN

## 2021-08-23 DIAGNOSIS — J30.2 SEASONAL ALLERGIC RHINITIS, UNSPECIFIED TRIGGER: ICD-10-CM

## 2021-08-23 DIAGNOSIS — F33.9 DEPRESSION, RECURRENT (HCC): ICD-10-CM

## 2021-08-23 DIAGNOSIS — M65.331 TRIGGER MIDDLE FINGER OF RIGHT HAND: Primary | ICD-10-CM

## 2021-08-23 PROCEDURE — 3008F BODY MASS INDEX DOCD: CPT | Performed by: PHYSICIAN ASSISTANT

## 2021-08-23 PROCEDURE — 99213 OFFICE O/P EST LOW 20 MIN: CPT | Performed by: PHYSICIAN ASSISTANT

## 2021-08-23 RX ORDER — IPRATROPIUM BROMIDE 21 UG/1
2 SPRAY, METERED NASAL EVERY 12 HOURS
Qty: 30 ML | Refills: 1 | Status: SHIPPED | OUTPATIENT
Start: 2021-08-23

## 2021-08-23 NOTE — PROGRESS NOTES
Assessment/Plan:     Diagnoses and all orders for this visit:    Trigger middle finger of right hand  Discussed likely osteoarthritis  Encouraged use of ice  May consider Voltaren gel on finger  Referral to hand surgery if wanting to discuss further options  -     Ambulatory referral to Hand Surgery; Future        Her goal is to d/c effexor  She will discuss with Neurology tomorrow    Subjective:    Patient ID: Venice Romero is a 54 y o  female  Pt is presenting today for Intermittent right middle finger pain and swelling  Pain improved the past few days  Typically swells with hot humid weather  Not used any medications for hand  Has been using ice pack  No improvement with rare use of Voltaren  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  Right hand dominant  Using all natural suppliment for energy since having brain fog, hearing change and urinary symptoms since COVID infection  Using suppliment which includes extract of chebulic myrobalan, amla, boerhavaia    The following portions of the patient's history were reviewed and updated as appropriate: allergies, current medications and problem list     Review of Systems   Constitutional: Negative for fatigue, fever and unexpected weight change  HENT: Negative for rhinorrhea and sore throat  Respiratory: Negative for cough, shortness of breath and wheezing  Cardiovascular: Negative for chest pain, palpitations and leg swelling  Gastrointestinal: Negative for constipation, diarrhea and nausea  Musculoskeletal: Negative for arthralgias and myalgias  Neurological:        Brain fog         Objective:  /70   Pulse 60   Temp 98 3 °F (36 8 °C)   Resp 16   Ht 5' 7" (1 702 m)   Wt 77 1 kg (170 lb)   BMI 26 63 kg/m²      Physical Exam  Vitals reviewed  Constitutional:       General: She is not in acute distress  Appearance: She is well-developed  She is not diaphoretic     HENT:      Head: Normocephalic and atraumatic  Eyes:      Pupils: Pupils are equal, round, and reactive to light  Cardiovascular:      Rate and Rhythm: Normal rate and regular rhythm  Heart sounds: Normal heart sounds  No murmur heard  No friction rub  No gallop  Pulmonary:      Effort: Pulmonary effort is normal  No respiratory distress  Breath sounds: Normal breath sounds  No wheezing or rales  Musculoskeletal:      Right hand: Swelling (PIP, 3rd finger) and tenderness (PIP 3rd finger) present  No bony tenderness  Normal range of motion  Normal sensation  Normal capillary refill  Cervical back: Normal range of motion and neck supple  Skin:     General: Skin is warm and dry  Neurological:      Mental Status: She is alert and oriented to person, place, and time  Psychiatric:         Behavior: Behavior normal          Thought Content:  Thought content normal

## 2021-08-24 ENCOUNTER — TELEPHONE (OUTPATIENT)
Dept: NEUROLOGY | Facility: CLINIC | Age: 56
End: 2021-08-24

## 2021-08-24 ENCOUNTER — OFFICE VISIT (OUTPATIENT)
Dept: NEUROLOGY | Facility: CLINIC | Age: 56
End: 2021-08-24
Payer: COMMERCIAL

## 2021-08-24 VITALS
DIASTOLIC BLOOD PRESSURE: 70 MMHG | TEMPERATURE: 97.8 F | BODY MASS INDEX: 26.94 KG/M2 | WEIGHT: 172 LBS | SYSTOLIC BLOOD PRESSURE: 136 MMHG | HEART RATE: 64 BPM

## 2021-08-24 DIAGNOSIS — U09.9 PERSISTENT FATIGUE AFTER COVID-19: ICD-10-CM

## 2021-08-24 DIAGNOSIS — R53.83 PERSISTENT FATIGUE AFTER COVID-19: ICD-10-CM

## 2021-08-24 DIAGNOSIS — Z77.29 CARBON MONOXIDE EXPOSURE: ICD-10-CM

## 2021-08-24 DIAGNOSIS — G43.109 VERTIGINOUS MIGRAINE: ICD-10-CM

## 2021-08-24 DIAGNOSIS — G43.709 CHRONIC MIGRAINE WITHOUT AURA WITHOUT STATUS MIGRAINOSUS, NOT INTRACTABLE: Primary | ICD-10-CM

## 2021-08-24 PROCEDURE — 99215 OFFICE O/P EST HI 40 MIN: CPT | Performed by: PHYSICIAN ASSISTANT

## 2021-08-24 PROCEDURE — 1036F TOBACCO NON-USER: CPT | Performed by: PHYSICIAN ASSISTANT

## 2021-08-24 RX ORDER — TOPIRAMATE 25 MG/1
TABLET ORAL
Qty: 270 TABLET | Refills: 1 | Status: SHIPPED | OUTPATIENT
Start: 2021-08-24 | End: 2021-11-16 | Stop reason: SINTOL

## 2021-08-24 RX ORDER — VENLAFAXINE HYDROCHLORIDE 75 MG/1
CAPSULE, EXTENDED RELEASE ORAL
Qty: 90 CAPSULE | Refills: 1 | Status: SHIPPED | OUTPATIENT
Start: 2021-08-24 | End: 2021-10-19 | Stop reason: CLARIF

## 2021-08-24 NOTE — TELEPHONE ENCOUNTER
Called patient and scheduled BINJ with Dr Ricky Hendrix in [facility] on 09/09/2021 @ 9:30 am

## 2021-08-24 NOTE — PROGRESS NOTES
Patient ID: Teresita Kessler is a 54 y o  female  Assessment/Plan:     Diagnoses and all orders for this visit:    Chronic migraine without aura without status migrainosus, not intractable  -     topiramate (TOPAMAX) 25 mg tablet; 3 tabs qhs  -     venlafaxine (EFFEXOR-XR) 75 mg 24 hr capsule; 1 capsule daily in the AM with food x 2 weeks, can decrease further to 75 mg QOD x 2 weeks, then stop  -     Chemodenervation; Future    Vertiginous migraine  -     venlafaxine (EFFEXOR-XR) 75 mg 24 hr capsule; 1 capsule daily in the AM with food x 2 weeks, can decrease further to 75 mg QOD x 2 weeks, then stop    Carbon monoxide exposure    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         New start botox-  The patient is agreeable to start Botox and potential side effects were reviewed in detail  She has >15 days with a migraine per month, each migraine lasting >4 hours long  She has tried several oral prevention meds as noted  Jayme rueda-   Current co-pay is about $800 (unless it is covered by the copay card- I gave her info on this)  She can make a slight increase in topamax from 50- 75 mg qhs to address migraines, until botox approval  She understands that this medication may also contribute to     In the past it seemed that olanzapine was effective with breaking the migraine cycle, so can use this in the future if needed  She was able to wean gabapentin and nortriptyline slowly, without any issues  Headaches are no worse  She feels cognitively more clear since waning these meds, dianne in light of prior CO exposure which still contributes  She intends to wean effexor  Continue to wean effexor, but slowly  She understands that she needs to contact me immediately and stop weaning if she develops any side effects symptoms such as irritability, mood changes, dizziness, headaches, etc     The patient should not hesitate to call me prior to her follow up with any questions or concerns        Subjective:    HPI  The patient had COVID infection in mid March  She tested positive on 3/29/2021  The patient states that her COVID infection manifested as anxiety at first, and later on she developed chills, fever, and overall increased stress level  Overall she was not feeling good  She reports a lot of fatigue  She was very tired  States she developed a sound in her head which sounded like a buzz  She would describe it as a short circuit  It was not a vibration  It just sounded like "buzz  "  And it lasted a second but recurred multiple times throughout the day  She still gets this but it is less frequent  With COVID infection she also developed spontaneous urination when standing up  She states her bladder just released  This resolved when COVID infection resolved  She was able to wean off of gabapentin and nortriptyline without difficulty  Her headaches are no worse since then  With COVID infection she had increased sound sensitivity  Now her migraines are a little bit more frequent, but she denies that this is because she weaned from gabapentin and nortriptyline  She states her biggest headache triggers pollen/seasonal allergies  She takes Zyrtec  She gets migraine headaches at least 2-3 times per week  She reports a migraine headache after the first few days of using Ajovy, but then it starts to work well at reducing migraines  Unfortunately Ajovy has a high co-pay and she is not able to continue it due to the cost     Since her COVID infection she started in energy supplement which she gets on [employer]  com and she feels that it helps with her energy  The following portions of the patient's history were reviewed and updated as appropriate:   She  has a past medical history of Abnormal Pap smear of cervix (1989), Asthma, Carbon monoxide exposure (2/6/2018), Chronic back pain, Lump of skin, Migraine, Sinus bradycardia, and Varicella    She   Patient Active Problem List    Diagnosis Date Noted    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 08/24/2021    Depression, recurrent ([facility] [address] ) 08/23/2021    Carbon monoxide exposure 08/24/2020    Encounter for gynecological examination (general) (routine) without abnormal findings 07/06/2020    Pelvic pain in female 07/06/2020    History of lumpectomy 07/06/2020    Non-intractable vomiting 05/20/2020    Seasonal allergies 05/20/2020    Skin rash 05/20/2020    Polyarthritis of multiple sites 04/17/2020    Phonophobia 02/14/2020    Vertiginous migraine 04/22/2019    Overweight (BMI 25 0-29 9) 03/01/2019    Anxiety and depression 03/01/2019    Varicose veins of both lower extremities with pain 03/01/2019    PND (post-nasal drip) 03/01/2019    Chronic migraine without aura without status migrainosus, not intractable 10/08/2018    Vertigo 02/06/2018    Essential hypertension 10/10/2016    Hypercholesterolemia 10/22/2014     She  has a past surgical history that includes Appendectomy; Tonsillectomy; Luck tooth extraction (Bilateral); Colonoscopy; Varicose vein surgery; and Breast cyst excision (Right, 2001)  Her family history includes Arthritis in her family; Colon cancer (age of onset: 48) in her father; Diabetes in her maternal aunt; Heart attack in her father; Heart disease in her sister; Heart failure in her mother; Hypertension in her brother; Lung cancer in her maternal grandfather; No Known Problems in her maternal grandmother, paternal aunt, paternal aunt, paternal aunt, paternal grandfather, paternal grandmother, sister, son, son, and son; Stroke in her brother and maternal aunt  She  reports that she quit smoking about 23 years ago  She has never used smokeless tobacco  She reports current alcohol use of about 7 0 standard drinks of alcohol per week  She reports that she does not use drugs    Current Outpatient Medications   Medication Sig Dispense Refill    Ascorbic Acid (CEDRICK-C PO) Take by mouth      aspirin 81 mg chewable tablet Chew 81 mg daily      cetirizine (ZyrTEC) 10 mg tablet Take 1 tablet (10 mg total) by mouth daily 90 tablet 1    fluticasone (FLONASE) 50 mcg/act nasal spray 2 sprays into each nostril daily 16 mL 1    fremanezumab-vfrm (Ajovy) 225 MG/1 5ML prefilled syringe 1 subcu injection q30 days  3 Syringe 4    Iodine, Kelp, (KELP PO) Take 1 tablet by mouth daily      ipratropium (ATROVENT) 0 03 % nasal spray 2 sprays into each nostril every 12 (twelve) hours 30 mL 1    ketorolac (TORADOL) 10 mg tablet 1 tab q6 hours prn migraine (with compazine if needed)  Max 2 per day and 3 per week  10 tablet 0    Magnesium 200 MG TABS Take 200 mg by mouth 2 (two) times a day      meclizine (ANTIVERT) 12 5 MG tablet 1 tab qhs prn dizziness and up to TID as needed 30 tablet 0    MULTIPLE VITAMINS ESSENTIAL PO Take by mouth      prochlorperazine (COMPAZINE) 10 mg tablet Take 1 tablet (10 mg total) by mouth every 6 (six) hours as needed for nausea or vomiting 20 tablet 0    topiramate (TOPAMAX) 25 mg tablet 3 tabs qhs 270 tablet 1    venlafaxine (EFFEXOR-XR) 75 mg 24 hr capsule 1 capsule daily in the AM with food x 2 weeks, can decrease further to 75 mg QOD x 2 weeks, then stop 90 capsule 1     No current facility-administered medications for this visit  She is allergic to penicillins, doxycycline, erythromycin, and other  Objective:    Blood pressure 136/70, pulse 64, temperature 97 8 °F (36 6 °C), temperature source Temporal, weight 78 kg (172 lb), not currently breastfeeding  Body mass index is 26 94 kg/m²  Physical Exam    Neurological Exam  On neurologic exam, the patient is alert and oriented to time and place  Speech is fluent and articulate, and the patient follows commands appropriately  Judgment and affect appear normal  Pupils are equally round and reactive to light and extraocular muscles are intact without nystagmus  Face is symmetric, and tongue, uvula, and palate are midline  Hearing is intact   Motor examination reveals intact strength throughout  Normal gait is steady  ROS:    Review of Systems   Constitutional: Negative  Negative for appetite change and fever  HENT: Negative  Negative for hearing loss, tinnitus, trouble swallowing and voice change  Eyes: Negative  Negative for photophobia and pain  Respiratory: Negative  Negative for shortness of breath  Cardiovascular: Negative  Negative for palpitations  Gastrointestinal: Negative  Negative for nausea and vomiting  Endocrine: Negative  Negative for cold intolerance  Genitourinary: Negative  Negative for dysuria, frequency and urgency  Musculoskeletal: Negative  Negative for myalgias and neck pain  Skin: Negative  Negative for rash  Neurological: Positive for headaches  Negative for dizziness, tremors, seizures, syncope, facial asymmetry, speech difficulty, weakness, light-headedness and numbness  Hematological: Negative  Does not bruise/bleed easily  Psychiatric/Behavioral: Negative  Negative for confusion, hallucinations and sleep disturbance  The following portions of the patient's history were reviewed and updated as appropriate: allergies, current medications/ medication history, past family history, past medical history, past social history, past surgical history and problem list     Review of systems was reviewed and otherwise unremarkable from a neurological perspective  I have spent 40 minutes with the patient today in which greater than 50% of this time was spent in counseling/coordination of care regarding diagnoses, test results, impression, plan and potential medication side effects

## 2021-08-24 NOTE — TELEPHONE ENCOUNTER
[address] [facility] and spoke with [address] [place] (Pharmacist)   Did verbal script for Botox 200 units SDV   Will call in 2 days to f/u

## 2021-08-24 NOTE — TELEPHONE ENCOUNTER
Botox 200 Units  NAN- [employer] employee  Please use [facility]

## 2021-08-24 NOTE — TELEPHONE ENCOUNTER
----- Message from Roselia Freed PA-C sent at 8/24/2021 11:02 AM EDT -----  Regarding: botox new start  The pt would like to start botox  Please schedule with Dr Xavier Rosenberg- could you make time for her at either [place] or [facility]? Much appreciated  Thanks

## 2021-08-25 NOTE — TELEPHONE ENCOUNTER
[address] [facility] and spoke to Alexander Aviles  She stated that medication is still with [facility]   will call to f/u

## 2021-08-27 NOTE — TELEPHONE ENCOUNTER
Submitted pharmacy auth through cover my meds   [place] Auth information :    [facility] Case ID: [id number]

## 2021-08-30 NOTE — TELEPHONE ENCOUNTER
Botox 200 units   To be delivered on 9/1/21   [employer] over night     If not here by 2pm or at all please let me know  Thank you

## 2021-08-30 NOTE — TELEPHONE ENCOUNTER
Received auth for botox through cover my meds for patient     Auth # [id number]  Valid: 07/28/2021-08/27/2022

## 2021-09-07 NOTE — TELEPHONE ENCOUNTER
Botox number of units: 200  Botox quantity: 1  Arrived at what location: [facility]  Botox at Correct Administering Location: YES  NDC number: 7937-4284-06  Lot number: [id number]  Expiration Date: 04/2024  Appt notes indicate correct medication: YES

## 2021-09-09 ENCOUNTER — PROCEDURE VISIT (OUTPATIENT)
Dept: NEUROLOGY | Facility: CLINIC | Age: 56
End: 2021-09-09
Payer: COMMERCIAL

## 2021-09-09 VITALS — TEMPERATURE: 98.1 F | HEART RATE: 60 BPM | DIASTOLIC BLOOD PRESSURE: 64 MMHG | SYSTOLIC BLOOD PRESSURE: 120 MMHG

## 2021-09-09 DIAGNOSIS — G43.709 CHRONIC MIGRAINE WITHOUT AURA WITHOUT STATUS MIGRAINOSUS, NOT INTRACTABLE: Primary | ICD-10-CM

## 2021-09-09 PROCEDURE — 64615 CHEMODENERV MUSC MIGRAINE: CPT | Performed by: PSYCHIATRY & NEUROLOGY

## 2021-09-09 RX ORDER — KETOROLAC TROMETHAMINE 10 MG/1
TABLET, FILM COATED ORAL
Qty: 15 TABLET | Refills: 2 | Status: SHIPPED | OUTPATIENT
Start: 2021-09-09 | End: 2021-11-16 | Stop reason: SDUPTHER

## 2021-09-09 RX ORDER — PROCHLORPERAZINE MALEATE 10 MG
10 TABLET ORAL EVERY 6 HOURS PRN
Qty: 20 TABLET | Refills: 2 | Status: SHIPPED | OUTPATIENT
Start: 2021-09-09 | End: 2021-11-16 | Stop reason: SDUPTHER

## 2021-09-09 NOTE — PROGRESS NOTES
Chemodenervation     Date/Time 9/9/2021 10:12 AM     Performed by  Evan Rios DO     Authorized by Evan Rios DO        Pre-procedure details      Prepped With: Alcohol     Anesthesia  (see MAR for exact dosages): Anesthesia method:  None   Procedure details     Position:  Supine and upright   Botox     Botox Type:  Type A    Brand:  Botox    mL's of Botulinum Toxin:  155    mL's of preservative free sterile saline:  2    Final Concentration per CC:  100 units    Needle Gauge:  30 G 2 5 inch   Procedures     Botox Procedures: chronic headache      Indications: migraines     Injection Location      Head / Face:  L superior cervical paraspinal, R superior cervical paraspinal, L , R , R frontalis, L frontalis, L lateral occipitalis, R lateral occipitalis, procerus, R temporalis, L temporalis, L superior trapezius and R superior trapezius    L  injection amount:  5 unit(s)    R  injection amount:  5 unit(s)    L lateral frontalis:  5 unit(s)    R lateral frontalis:  5 unit(s)    L medial frontalis:  5 unit(s)    R medial frontalis:  5 unit(s)    L temporalis injection amount:  20 unit(s)    R temporalis injection amount:  20 unit(s)    Procerus injection amount:  5 unit(s)    L lateral occipitalis injection amount:  15 unit(s)    R lateral occipitalis injection amount:  15 unit(s)    L superior cervical paraspinal injection amount:  10 unit(s)    R superior cervical paraspinal injection amount:  10 unit(s)    L superior trapezius injection amount:  15 unit(s)    R superior trapezius injection amount:  15 unit(s)   Total Units     Total units used:  155    Total units discarded:  45   Post-procedure details      Chemodenervation:  Chronic migraine    Facial Nerve Location[de-identified]  Bilateral facial nerve    Patient tolerance of procedure:   Tolerated well, no immediate complications   Comments      This is her first set of injections we discussed potential a/e including ptosis,   Worse headache, allergy reaction such as rash  If any of these occur are to occur patient should let me know immediately  Follow-up in 3 months for reassessment and likely Botox injectio

## 2021-09-13 ENCOUNTER — TELEPHONE (OUTPATIENT)
Dept: NEUROLOGY | Facility: CLINIC | Age: 56
End: 2021-09-13

## 2021-09-13 ENCOUNTER — CONSULT (OUTPATIENT)
Dept: OBGYN CLINIC | Facility: MEDICAL CENTER | Age: 56
End: 2021-09-13
Payer: COMMERCIAL

## 2021-09-13 ENCOUNTER — APPOINTMENT (OUTPATIENT)
Dept: RADIOLOGY | Facility: MEDICAL CENTER | Age: 56
End: 2021-09-13
Payer: COMMERCIAL

## 2021-09-13 VITALS
DIASTOLIC BLOOD PRESSURE: 83 MMHG | HEART RATE: 68 BPM | SYSTOLIC BLOOD PRESSURE: 137 MMHG | WEIGHT: 173.2 LBS | HEIGHT: 67 IN | BODY MASS INDEX: 27.18 KG/M2

## 2021-09-13 DIAGNOSIS — S69.90XA FINGER INJURY, INITIAL ENCOUNTER: Primary | ICD-10-CM

## 2021-09-13 DIAGNOSIS — S69.90XA FINGER INJURY, INITIAL ENCOUNTER: ICD-10-CM

## 2021-09-13 DIAGNOSIS — M19.049 ARTHRITIS OF FINGER: ICD-10-CM

## 2021-09-13 DIAGNOSIS — M65.331 TRIGGER MIDDLE FINGER OF RIGHT HAND: ICD-10-CM

## 2021-09-13 PROCEDURE — 20600 DRAIN/INJ JOINT/BURSA W/O US: CPT | Performed by: PHYSICIAN ASSISTANT

## 2021-09-13 PROCEDURE — 99204 OFFICE O/P NEW MOD 45 MIN: CPT | Performed by: PHYSICIAN ASSISTANT

## 2021-09-13 PROCEDURE — 73140 X-RAY EXAM OF FINGER(S): CPT

## 2021-09-13 RX ORDER — TRIAMCINOLONE ACETONIDE 40 MG/ML
20 INJECTION, SUSPENSION INTRA-ARTICULAR; INTRAMUSCULAR
Status: COMPLETED | OUTPATIENT
Start: 2021-09-13 | End: 2021-09-13

## 2021-09-13 RX ORDER — LIDOCAINE HYDROCHLORIDE 10 MG/ML
2.5 INJECTION, SOLUTION INFILTRATION; PERINEURAL
Status: COMPLETED | OUTPATIENT
Start: 2021-09-13 | End: 2021-09-13

## 2021-09-13 RX ORDER — ONABOTULINUMTOXINA 200 [USP'U]/1
INJECTION, POWDER, LYOPHILIZED, FOR SOLUTION INTRADERMAL; INTRAMUSCULAR
COMMUNITY
Start: 2021-08-31

## 2021-09-13 RX ADMIN — Medication 0.05 MEQ: at 10:12

## 2021-09-13 RX ADMIN — LIDOCAINE HYDROCHLORIDE 2.5 ML: 10 INJECTION, SOLUTION INFILTRATION; PERINEURAL at 10:12

## 2021-09-13 RX ADMIN — TRIAMCINOLONE ACETONIDE 20 MG: 40 INJECTION, SUSPENSION INTRA-ARTICULAR; INTRAMUSCULAR at 10:12

## 2021-09-13 NOTE — PROGRESS NOTES
CHIEF COMPLAINT:  Chief Complaint   Patient presents with    Right Middle Finger - Pain       SUBJECTIVE:  Janae Browne is a 54y o  year old female who presents right long finger PIP joint pain  Patient states she notes stiffness 1st thing in the morning  She also notes stiffness while trying to make a full composite fist   She states she has had an injury about 10 years ago when her finger got caught in a bowling ball  She also note at the end of the workday she will have difficulty trying to make a fist   She denies any numbness or tingling          PAST MEDICAL HISTORY:  Past Medical History:   Diagnosis Date    Abnormal Pap smear of cervix 1989    all normal since    Asthma     Carbon monoxide exposure 2/6/2018    Chronic back pain     Lump of skin     last assessed 11/21/13    Migraine     Sinus bradycardia     last assessed 10/25/16    Varicella        PAST SURGICAL HISTORY:  Past Surgical History:   Procedure Laterality Date    APPENDECTOMY      BREAST CYST EXCISION Right 2001    benign    COLONOSCOPY      TONSILLECTOMY      VARICOSE VEIN SURGERY      WISDOM TOOTH EXTRACTION Bilateral        FAMILY HISTORY:  Family History   Problem Relation Age of Onset    Heart failure Mother         CHF    Heart attack Father     Colon cancer Father 48    Heart disease Sister     No Known Problems Sister     Hypertension Brother     Stroke Brother     No Known Problems Maternal Grandmother     Lung cancer Maternal Grandfather     No Known Problems Paternal Grandmother     No Known Problems Paternal Grandfather     No Known Problems Son     No Known Problems Son     No Known Problems Son     Diabetes Maternal Aunt     Stroke Maternal Aunt     No Known Problems Paternal Aunt     No Known Problems Paternal Aunt     No Known Problems Paternal Aunt     Arthritis Family     Breast cancer Neg Hx        SOCIAL HISTORY:  Social History     Tobacco Use    Smoking status: Former Smoker Quit date: 0     Years since quittin 7    Smokeless tobacco: Never Used   Vaping Use    Vaping Use: Never used   Substance Use Topics    Alcohol use: Yes     Alcohol/week: 7 0 standard drinks     Types: 7 Glasses of wine per week     Comment: social per [employer]    Drug use: No       MEDICATIONS:    Current Outpatient Medications:     Ascorbic Acid (CEDRICK-C PO), Take by mouth, Disp: , Rfl:     aspirin 81 mg chewable tablet, Chew 81 mg daily, Disp: , Rfl:     Botox 200 units SOLR, , Disp: , Rfl:     cetirizine (ZyrTEC) 10 mg tablet, Take 1 tablet (10 mg total) by mouth daily, Disp: 90 tablet, Rfl: 1    fluticasone (FLONASE) 50 mcg/act nasal spray, 2 sprays into each nostril daily, Disp: 16 mL, Rfl: 1    fremanezumab-vfrm (Ajovy) 225 MG/1 5ML prefilled syringe, 1 subcu injection q30 days  , Disp: 3 Syringe, Rfl: 4    Iodine, Kelp, (KELP PO), Take 1 tablet by mouth daily, Disp: , Rfl:     ipratropium (ATROVENT) 0 03 % nasal spray, 2 sprays into each nostril every 12 (twelve) hours, Disp: 30 mL, Rfl: 1    ketorolac (TORADOL) 10 mg tablet, 1 tab q6 hours prn migraine (with compazine if needed)   Max 2 per day and 3 per week , Disp: 15 tablet, Rfl: 2    Magnesium 200 MG TABS, Take 200 mg by mouth 2 (two) times a day, Disp: , Rfl:     meclizine (ANTIVERT) 12 5 MG tablet, 1 tab qhs prn dizziness and up to TID as needed, Disp: 30 tablet, Rfl: 0    MULTIPLE VITAMINS ESSENTIAL PO, Take by mouth, Disp: , Rfl:     prochlorperazine (COMPAZINE) 10 mg tablet, Take 1 tablet (10 mg total) by mouth every 6 (six) hours as needed for nausea or vomiting, Disp: 20 tablet, Rfl: 2    topiramate (TOPAMAX) 25 mg tablet, 3 tabs qhs, Disp: 270 tablet, Rfl: 1    venlafaxine (EFFEXOR-XR) 75 mg 24 hr capsule, 1 capsule daily in the AM with food x 2 weeks, can decrease further to 75 mg QOD x 2 weeks, then stop, Disp: 90 capsule, Rfl: 1    ALLERGIES:  Allergies   Allergen Reactions    Penicillins     Doxycycline     Erythromycin     Other      Mushrooms       REVIEW OF SYSTEMS:  Review of Systems  ROS:   General: no fever, no chills  HEENT:  No loss of hearing or eyesight problems  Eyes:  No red eyes  Respiratory:  No coughing, shortness of breath or wheezing  Cardiovascular:  No chest pain, no palpitations  GI:  Abdomen soft nontender, denies nausea  Endocrine:  No muscle weakness, no frequent urination, no excessive thirst  Urinary:  No dysuria, no incontinence  Musculoskeletal: see HPI and PE  SKIN:  No skin rash, no dry skin  Neurological:  No headaches, no confusion  Psychiatric:  No suicide thoughts, no anxiety, no depression  Review of all other systems is negative    VITALS:  Vitals:    09/13/21 0922   BP: 137/83   Pulse: 68       LABS:  HgA1c: No results found for: HGBA1C  BMP:   Lab Results   Component Value Date    GLUCOSE 92 12/18/2015    CALCIUM 9 6 03/11/2021     12/18/2015    K 3 6 03/11/2021    CO2 24 03/11/2021     03/11/2021    BUN 12 03/11/2021    CREATININE 0 98 03/11/2021       _____________________________________________________  PHYSICAL EXAMINATION:  General: well developed and well nourished, alert, oriented times 3 and appears comfortable  Psychiatric: Normal  HEENT: Trachea Midline, No torticollis  Pulmonary: No audible wheezing or strider  Cardiovascular: No discernable arrhythmia   Skin: No masses, erythema, lacerations, fluctation, ulcerations  Neurovascular: Sensation Intact to the Median, Ulnar, Radial Nerve, Motor Intact to the Median, Ulnar, Radial Nerve and Pulses Intact    MUSCULOSKELETAL EXAMINATION:  Right long finger   Mild swelling noted over the PIP joint, no erythema or ecchymosis noted   Minimal tenderness to palpation over the PIP joint   Stiffness is noted with range of motion with flexion and extension of the PIP joint  Joint is stable with ligamentous testing    ___________________________________________________  STUDIES REVIEWED:  Images obtained today of the Right long finger 3 views demonstrate Mild osteoarthritis noted of the PIP joint      PROCEDURES PERFORMED:  Small joint arthrocentesis: R long PIP  Universal Protocol:  Consent: Verbal consent obtained  Risks and benefits: risks, benefits and alternatives were discussed  Consent given by: patient  Patient understanding: patient states understanding of the procedure being performed  Patient consent: the patient's understanding of the procedure matches consent given  Site marked: the operative site was marked  Patient identity confirmed: verbally with patient    Supporting Documentation  Indications: pain   Procedure Details  Location: long finger - R long PIP  Preparation: Patient was prepped and draped in the usual sterile fashion  Needle size: 25 G  Approach: medial  Medications administered: 0 05 mEq sodium bicarbonate 8 4 %; 2 5 mL lidocaine 1 %; 20 mg triamcinolone acetonide 40 mg/mL    Patient tolerance: patient tolerated the procedure well with no immediate complications  Dressing:  Sterile dressing applied             _____________________________________________________  ASSESSMENT/PLAN:    Right long finger PIP joint osteoarthritis  · Patient was given cortisone injection today  She tolerated well  · She was advised to use heat as well as work on her range of motion   · She can take Tylenol and anti-inflammatories as needed for pain   A cortisone injection was offered today and the patent wished to proceed  The patient may experience increased pain at the injection site for a few days  Topical ice is recommended today, followed by topical heat  NSAIDS and Tylenol may be used, as long as they are not contraindicated  · She will follow-up in 2 weeks for re-evaluation    Follow Up:  Return in about 2 weeks (around 9/27/2021)  Work/school status:  No restrictions    To Do Next Visit:  Re-evaluation of current issue            Portions of the record may have been created with voice recognition software  Occasional wrong word or "sound a like" substitutions may have occurred due to the inherent limitations of voice recognition software  Read the chart carefully and recognize, using context, where substitutions have occurred

## 2021-09-13 NOTE — TELEPHONE ENCOUNTER
started botox on 9/9 with dr Polly Broderick, she is scheduled to see [address] [facility] for follow up on11/16 but not scheduled for further botox appts  please contact pt to schedule botox appt    She states that she was to be scheduled with [address] [facility] [address]  [phone] to leave detailed message

## 2021-09-27 ENCOUNTER — OFFICE VISIT (OUTPATIENT)
Dept: OBGYN CLINIC | Facility: MEDICAL CENTER | Age: 56
End: 2021-09-27
Payer: COMMERCIAL

## 2021-09-27 VITALS
SYSTOLIC BLOOD PRESSURE: 124 MMHG | HEART RATE: 63 BPM | DIASTOLIC BLOOD PRESSURE: 80 MMHG | HEIGHT: 67 IN | BODY MASS INDEX: 27.15 KG/M2 | WEIGHT: 173 LBS

## 2021-09-27 DIAGNOSIS — M19.049 ARTHRITIS OF FINGER: Primary | ICD-10-CM

## 2021-09-27 PROCEDURE — 1036F TOBACCO NON-USER: CPT | Performed by: PHYSICIAN ASSISTANT

## 2021-09-27 PROCEDURE — 99213 OFFICE O/P EST LOW 20 MIN: CPT | Performed by: PHYSICIAN ASSISTANT

## 2021-09-27 PROCEDURE — 3008F BODY MASS INDEX DOCD: CPT | Performed by: PHYSICIAN ASSISTANT

## 2021-09-27 NOTE — PROGRESS NOTES
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  Patient received cortisone injection at her last visit  She states that she has minimal pain and discomfort at this time  She states that she will note a clicking sensation over the top of her PIP joint  She denies any pain into the palmar aspect of her hand  She states occasionally she will have some clicking and locking of the long finger if grasping something very heavily  She denies any numbness or tingling        PAST MEDICAL HISTORY:  Past Medical History:   Diagnosis Date    Abnormal Pap smear of cervix 1989    all normal since    Asthma     Carbon monoxide exposure 2/6/2018    Chronic back pain     Lump of skin     last assessed 11/21/13    Migraine     Sinus bradycardia     last assessed 10/25/16    Varicella        PAST SURGICAL HISTORY:  Past Surgical History:   Procedure Laterality Date    APPENDECTOMY      BREAST CYST EXCISION Right 2001    benign    COLONOSCOPY      TONSILLECTOMY      VARICOSE VEIN SURGERY      WISDOM TOOTH EXTRACTION Bilateral        FAMILY HISTORY:  Family History   Problem Relation Age of Onset    Heart failure Mother         CHF    Heart attack Father     Colon cancer Father 48    Heart disease Sister     No Known Problems Sister     Hypertension Brother     Stroke Brother     No Known Problems Maternal Grandmother     Lung cancer Maternal Grandfather     No Known Problems Paternal Grandmother     No Known Problems Paternal Grandfather     No Known Problems Son     No Known Problems Son     No Known Problems Son     Diabetes Maternal Aunt     Stroke Maternal Aunt     No Known Problems Paternal Aunt     No Known Problems Paternal Aunt     No Known Problems Paternal Aunt     Arthritis Family     Breast cancer Neg Hx        SOCIAL HISTORY:  Social History     Tobacco Use    Smoking status: Former Smoker     Quit date:      Years since quittin 7    Smokeless tobacco: Never Used   Vaping Use    Vaping Use: Never used   Substance Use Topics    Alcohol use: Yes     Alcohol/week: 7 0 standard drinks     Types: 7 Glasses of wine per week     Comment: social per [employer]    Drug use: No       MEDICATIONS:    Current Outpatient Medications:     Ascorbic Acid (CEDRICK-C PO), Take by mouth, Disp: , Rfl:     aspirin 81 mg chewable tablet, Chew 81 mg daily, Disp: , Rfl:     Botox 200 units SOLR, , Disp: , Rfl:     cetirizine (ZyrTEC) 10 mg tablet, Take 1 tablet (10 mg total) by mouth daily, Disp: 90 tablet, Rfl: 1    fluticasone (FLONASE) 50 mcg/act nasal spray, 2 sprays into each nostril daily, Disp: 16 mL, Rfl: 1    fremanezumab-vfrm (Ajovy) 225 MG/1 5ML prefilled syringe, 1 subcu injection q30 days  , Disp: 3 Syringe, Rfl: 4    Iodine, Kelp, (KELP PO), Take 1 tablet by mouth daily, Disp: , Rfl:     ipratropium (ATROVENT) 0 03 % nasal spray, 2 sprays into each nostril every 12 (twelve) hours, Disp: 30 mL, Rfl: 1    ketorolac (TORADOL) 10 mg tablet, 1 tab q6 hours prn migraine (with compazine if needed)   Max 2 per day and 3 per week , Disp: 15 tablet, Rfl: 2    Magnesium 200 MG TABS, Take 200 mg by mouth 2 (two) times a day, Disp: , Rfl:     meclizine (ANTIVERT) 12 5 MG tablet, 1 tab qhs prn dizziness and up to TID as needed, Disp: 30 tablet, Rfl: 0    MULTIPLE VITAMINS ESSENTIAL PO, Take by mouth, Disp: , Rfl:     prochlorperazine (COMPAZINE) 10 mg tablet, Take 1 tablet (10 mg total) by mouth every 6 (six) hours as needed for nausea or vomiting, Disp: 20 tablet, Rfl: 2    topiramate (TOPAMAX) 25 mg tablet, 3 tabs qhs, Disp: 270 tablet, Rfl: 1    venlafaxine (EFFEXOR-XR) 75 mg 24 hr capsule, 1 capsule daily in the AM with food x 2 weeks, can decrease further to 75 mg QOD x 2 weeks, then stop, Disp: 90 capsule, Rfl: 1    ALLERGIES:  Allergies   Allergen Reactions    Penicillins     Doxycycline     Erythromycin     Other      Mushrooms       REVIEW OF SYSTEMS:  Review of Systems  ROS:   General: no fever, no chills  HEENT:  No loss of hearing or eyesight problems  Eyes:  No red eyes  Respiratory:  No coughing, shortness of breath or wheezing  Cardiovascular:  No chest pain, no palpitations  GI:  Abdomen soft nontender, denies nausea  Endocrine:  No muscle weakness, no frequent urination, no excessive thirst  Urinary:  No dysuria, no incontinence  Musculoskeletal: see HPI and PE  SKIN:  No skin rash, no dry skin  Neurological:  No headaches, no confusion  Psychiatric:  No suicide thoughts, no anxiety, no depression  Review of all other systems is negative    VITALS:  Vitals:    09/27/21 0930   BP: 124/80   Pulse: 63       LABS:  HgA1c: No results found for: HGBA1C  BMP:   Lab Results   Component Value Date    GLUCOSE 92 12/18/2015    CALCIUM 9 6 03/11/2021     12/18/2015    K 3 6 03/11/2021    CO2 24 03/11/2021     03/11/2021    BUN 12 03/11/2021    CREATININE 0 98 03/11/2021       _____________________________________________________  PHYSICAL EXAMINATION:  General: well developed and well nourished, alert, oriented times 3 and appears comfortable  Psychiatric: Normal  HEENT: Trachea Midline, No torticollis  Pulmonary: No audible wheezing or respiratory distress   Skin: No masses, erythema, lacerations, fluctation, ulcerations  Neurovascular: Sensation Intact to the Median, Ulnar, Radial Nerve, Motor Intact to the Median, Ulnar, Radial Nerve and Pulses Intact    MUSCULOSKELETAL EXAMINATION:  Right hand long finger  No erythema edema or ecchymosis noted, skin is warm to touch   No tenderness to palpation over the PIP joint   She has near full range of motion with flexion and extension of the PIP joint  Joint is stable with ligamentous testing  ___________________________________________________  STUDIES REVIEWED:  No studies reviewed  PROCEDURES PERFORMED:  Procedures  No Procedures performed today    _____________________________________________________  ASSESSMENT/PLAN:    Right hand long finger PIP joint OA, occasional clicking and locking  · Patient was advised to use heat, massage and range of motion exercises to help work the PIP joint   · She was advised that we can repeat the cortisone injections for the PIP joint every 3-4 months as needed   · She was advised that if she were to experience clicking and locking symptoms that are continuous and have pain, we can try cortisone injection for trigger finger  · She will follow-up with us as needed      Follow Up:  Return if symptoms worsen or fail to improve  Work/school status:  No restrictions    To Do Next Visit:  Re-evaluation of current issue            Portions of the record may have been created with voice recognition software  Occasional wrong word or "sound a like" substitutions may have occurred due to the inherent limitations of voice recognition software  Read the chart carefully and recognize, using context, where substitutions have occurred

## 2021-10-18 DIAGNOSIS — G43.709 CHRONIC MIGRAINE WITHOUT AURA WITHOUT STATUS MIGRAINOSUS, NOT INTRACTABLE: ICD-10-CM

## 2021-10-18 DIAGNOSIS — G43.109 VERTIGINOUS MIGRAINE: ICD-10-CM

## 2021-10-20 RX ORDER — VENLAFAXINE 25 MG/1
TABLET ORAL
Qty: 21 TABLET | Refills: 0 | Status: SHIPPED | OUTPATIENT
Start: 2021-10-20 | End: 2021-10-22 | Stop reason: SDUPTHER

## 2021-10-22 DIAGNOSIS — G43.709 CHRONIC MIGRAINE WITHOUT AURA WITHOUT STATUS MIGRAINOSUS, NOT INTRACTABLE: ICD-10-CM

## 2021-10-24 RX ORDER — VENLAFAXINE 25 MG/1
TABLET ORAL
Qty: 42 TABLET | Refills: 0 | Status: SHIPPED | OUTPATIENT
Start: 2021-10-24 | End: 2021-11-16 | Stop reason: SDUPTHER

## 2021-11-04 ENCOUNTER — TELEPHONE (OUTPATIENT)
Dept: NEUROLOGY | Facility: CLINIC | Age: 56
End: 2021-11-04

## 2021-11-16 ENCOUNTER — OFFICE VISIT (OUTPATIENT)
Dept: NEUROLOGY | Facility: CLINIC | Age: 56
End: 2021-11-16
Payer: COMMERCIAL

## 2021-11-16 VITALS
DIASTOLIC BLOOD PRESSURE: 69 MMHG | WEIGHT: 177.4 LBS | SYSTOLIC BLOOD PRESSURE: 139 MMHG | TEMPERATURE: 95.4 F | HEART RATE: 71 BPM | RESPIRATION RATE: 18 BRPM | HEIGHT: 67 IN | BODY MASS INDEX: 27.84 KG/M2

## 2021-11-16 DIAGNOSIS — U09.9 PERSISTENT FATIGUE AFTER COVID-19: ICD-10-CM

## 2021-11-16 DIAGNOSIS — G43.709 CHRONIC MIGRAINE WITHOUT AURA WITHOUT STATUS MIGRAINOSUS, NOT INTRACTABLE: Primary | ICD-10-CM

## 2021-11-16 DIAGNOSIS — R53.83 PERSISTENT FATIGUE AFTER COVID-19: ICD-10-CM

## 2021-11-16 DIAGNOSIS — Z77.29 CARBON MONOXIDE EXPOSURE: ICD-10-CM

## 2021-11-16 DIAGNOSIS — G43.109 VERTIGINOUS MIGRAINE: ICD-10-CM

## 2021-11-16 PROCEDURE — 99214 OFFICE O/P EST MOD 30 MIN: CPT | Performed by: PHYSICIAN ASSISTANT

## 2021-11-16 PROCEDURE — 3008F BODY MASS INDEX DOCD: CPT | Performed by: PHYSICIAN ASSISTANT

## 2021-11-16 PROCEDURE — 1036F TOBACCO NON-USER: CPT | Performed by: PHYSICIAN ASSISTANT

## 2021-11-16 RX ORDER — PROCHLORPERAZINE MALEATE 10 MG
10 TABLET ORAL EVERY 6 HOURS PRN
Qty: 20 TABLET | Refills: 2 | Status: SHIPPED | OUTPATIENT
Start: 2021-11-16 | End: 2022-04-26 | Stop reason: SDUPTHER

## 2021-11-16 RX ORDER — VENLAFAXINE 25 MG/1
TABLET ORAL
Qty: 180 TABLET | Refills: 1 | Status: SHIPPED | OUTPATIENT
Start: 2021-11-16 | End: 2022-04-26 | Stop reason: SDUPTHER

## 2021-11-16 RX ORDER — KETOROLAC TROMETHAMINE 10 MG/1
TABLET, FILM COATED ORAL
Qty: 15 TABLET | Refills: 2 | Status: SHIPPED | OUTPATIENT
Start: 2021-11-16

## 2021-11-17 ENCOUNTER — TELEPHONE (OUTPATIENT)
Dept: NEUROLOGY | Facility: CLINIC | Age: 56
End: 2021-11-17

## 2021-12-02 ENCOUNTER — TELEPHONE (OUTPATIENT)
Dept: NEUROLOGY | Facility: CLINIC | Age: 56
End: 2021-12-02

## 2021-12-14 ENCOUNTER — PROCEDURE VISIT (OUTPATIENT)
Dept: NEUROLOGY | Facility: CLINIC | Age: 56
End: 2021-12-14
Payer: COMMERCIAL

## 2021-12-14 VITALS
HEIGHT: 67 IN | WEIGHT: 177.8 LBS | SYSTOLIC BLOOD PRESSURE: 152 MMHG | HEART RATE: 64 BPM | TEMPERATURE: 97.9 F | DIASTOLIC BLOOD PRESSURE: 67 MMHG | BODY MASS INDEX: 27.91 KG/M2

## 2021-12-14 DIAGNOSIS — G43.709 CHRONIC MIGRAINE WITHOUT AURA WITHOUT STATUS MIGRAINOSUS, NOT INTRACTABLE: Primary | ICD-10-CM

## 2021-12-14 PROCEDURE — 3008F BODY MASS INDEX DOCD: CPT | Performed by: PHYSICIAN ASSISTANT

## 2021-12-14 PROCEDURE — 64615 CHEMODENERV MUSC MIGRAINE: CPT | Performed by: PHYSICIAN ASSISTANT

## 2022-02-04 ENCOUNTER — TELEPHONE (OUTPATIENT)
Dept: NEUROLOGY | Facility: CLINIC | Age: 57
End: 2022-02-04

## 2022-02-04 NOTE — TELEPHONE ENCOUNTER
Call [facility] spoke with Jose Lundy, I told her I was calling to order the patient Botox delivery, as per Jose Lundy patient is going thru benefits verification for the month of Feb  Please allowed 24-72 hours [place] ambulatory encounter  ORTHOPEDIC HISTORY AND PHYSICAL    CHIEF COMPLAINT:  Hip (Follow up R MIKE DOS:1/21/21)       SUBJECTIVE:  I saw the patient and have collaborated the history with APOLINAR Bazan as documented.    Review of systems:    Systems reviewed and negative except as documented in the HPI.    PROBLEM LIST:  Patient Active Problem List   Diagnosis   • Facial laceration   • Cigarette nicotine dependence without complication   • Hyperlipidemia   • Former consumption of alcohol   • Opioid dependence in remission (CMS/HCC)   • Other male erectile dysfunction   • Accidentally struck by falling tree   • Closed fracture of clavicle   • Essential hypertension, benign   • Fracture of maxilla (CMS/HCC)   • Moderate obstructive sleep apnea        HISTORIES:  Current Outpatient Medications   Medication   • tadalafil (Cialis) 5 MG tablet   • lisinopril (ZESTRIL) 10 MG tablet   • atorvastatin (LIPITOR) 10 MG tablet   • metFORMIN (GLUCOPHAGE-XR) 500 MG 24 hr tablet   • albuterol 108 (90 Base) MCG/ACT inhaler   • nicotine (NICODERM) 21 MG/24HR patch   • celecoxib (CeleBREX) 200 MG capsule   • cyclobenzaprine (FLEXERIL) 5 MG tablet     No current facility-administered medications for this visit.     ALLERGIES:  No Known Allergies    OBJECTIVE:  PHYSICAL EXAM-    Vitals:   Visit Vitals  Resp 16   Ht 5' 7\" (1.702 m)   Wt 94.2 kg   BMI 32.53 kg/m²      Constitutional:  Well-developed, well-nourished male in no acute distress.  Skin: Warm, dry, intact without rash or lesion.  No subcutaneous masses.  HEENT:  Normocephalic.  Hearing intact.  Vision intact.  Psych:  Alert & oriented x 3.  Mood and insight appropriate.  CV:  Pulse is regular.  No significant pitting edema or lymphedema.   Resp:  Respiratory effort within appropriate limits.    Abdomen:  No abnormal distension.  Neuro:  No gross sensory deficits.  Spine:  No gross curvature of spine.  Appropriate mobility without instability.  No gross pelvic  obliquity.  Musculoskeletal:  I examined the patient and have collaborated the examination with APOLINAR Bazan as documented.  No pain with gentle hip ROM.  Nonantalgic gait.    IMAGING STUDIES:     See below.    ASSESSMENT:    1. Status post total hip replacement, right        PLAN:    · I examined the patient's hip. I reviewed x-rays today. I discussed risks and benefits of the treatment options with the patient. Progress back to full activities as tolerated.  Antibiotic prophylaxis before dental work. Follow up in 1 year.  · No follow-ups on file.    Orders Placed This Encounter   • XR Hip 2 VW Right       Instructions provided as documented in the AVS.    The patient indicated understanding of the diagnosis and agreed with the plan of care.

## 2022-02-07 ENCOUNTER — TELEPHONE (OUTPATIENT)
Dept: FAMILY MEDICINE CLINIC | Facility: CLINIC | Age: 57
End: 2022-02-07

## 2022-02-07 NOTE — TELEPHONE ENCOUNTER
Call [facility] spoke with Radha Reynolds  Botox  200 units is schedule for delivery on 02/23/2022    VIA [employer] [facility]    Please inform of delivery

## 2022-02-07 NOTE — TELEPHONE ENCOUNTER
Patient calling requesting yearly blood work for her to get done  Patient is also requesting a referral for Cardiology for heart palpitations due to continuous stress   Okay to leave VM if unable to answer

## 2022-02-12 ENCOUNTER — APPOINTMENT (OUTPATIENT)
Dept: LAB | Facility: MEDICAL CENTER | Age: 57
End: 2022-02-12
Payer: COMMERCIAL

## 2022-02-12 DIAGNOSIS — I10 ESSENTIAL HYPERTENSION: ICD-10-CM

## 2022-02-12 DIAGNOSIS — E78.00 HYPERCHOLESTEROLEMIA: ICD-10-CM

## 2022-02-12 DIAGNOSIS — Z00.00 ROUTINE GENERAL MEDICAL EXAMINATION AT A HEALTH CARE FACILITY: ICD-10-CM

## 2022-02-12 LAB
ANION GAP SERPL CALCULATED.3IONS-SCNC: 4 MMOL/L (ref 4–13)
BUN SERPL-MCNC: 14 MG/DL (ref 5–25)
CALCIUM SERPL-MCNC: 9.7 MG/DL (ref 8.3–10.1)
CHLORIDE SERPL-SCNC: 108 MMOL/L (ref 100–108)
CHOLEST SERPL-MCNC: 257 MG/DL
CO2 SERPL-SCNC: 30 MMOL/L (ref 21–32)
CREAT SERPL-MCNC: 0.83 MG/DL (ref 0.6–1.3)
GFR SERPL CREATININE-BSD FRML MDRD: 79 ML/MIN/1.73SQ M
GLUCOSE P FAST SERPL-MCNC: 93 MG/DL (ref 65–99)
HDLC SERPL-MCNC: 85 MG/DL
LDLC SERPL CALC-MCNC: 154 MG/DL (ref 0–100)
POTASSIUM SERPL-SCNC: 4.6 MMOL/L (ref 3.5–5.3)
SODIUM SERPL-SCNC: 142 MMOL/L (ref 136–145)
TRIGL SERPL-MCNC: 89 MG/DL
TSH SERPL DL<=0.05 MIU/L-ACNC: 2.74 UIU/ML (ref 0.36–3.74)

## 2022-02-12 PROCEDURE — 80048 BASIC METABOLIC PNL TOTAL CA: CPT

## 2022-02-12 PROCEDURE — 36415 COLL VENOUS BLD VENIPUNCTURE: CPT

## 2022-02-12 PROCEDURE — 84443 ASSAY THYROID STIM HORMONE: CPT

## 2022-02-12 PROCEDURE — 80061 LIPID PANEL: CPT

## 2022-02-17 ENCOUNTER — TELEPHONE (OUTPATIENT)
Dept: OBGYN CLINIC | Facility: MEDICAL CENTER | Age: 57
End: 2022-02-17

## 2022-02-23 NOTE — TELEPHONE ENCOUNTER
Botox number of units: 200  Botox quantity: 1  Arrived at what location: [facility]  Botox at Correct Administering Location: YES  NDC number: 0836-8179-82  Lot number: [id number]  Expiration Date: 10/2024  Appt notes indicate correct medication: YES     Botox received and stored in fridge

## 2022-03-02 ENCOUNTER — TELEPHONE (OUTPATIENT)
Dept: NEUROLOGY | Facility: CLINIC | Age: 57
End: 2022-03-02

## 2022-03-09 ENCOUNTER — OFFICE VISIT (OUTPATIENT)
Dept: CARDIOLOGY CLINIC | Facility: MEDICAL CENTER | Age: 57
End: 2022-03-09
Payer: COMMERCIAL

## 2022-03-09 VITALS
OXYGEN SATURATION: 97 % | HEIGHT: 67 IN | BODY MASS INDEX: 27.78 KG/M2 | WEIGHT: 177 LBS | SYSTOLIC BLOOD PRESSURE: 122 MMHG | HEART RATE: 71 BPM | DIASTOLIC BLOOD PRESSURE: 82 MMHG

## 2022-03-09 DIAGNOSIS — I10 ESSENTIAL HYPERTENSION: Primary | ICD-10-CM

## 2022-03-09 DIAGNOSIS — I49.1 PAC (PREMATURE ATRIAL CONTRACTION): ICD-10-CM

## 2022-03-09 DIAGNOSIS — R00.2 PALPITATIONS: ICD-10-CM

## 2022-03-09 DIAGNOSIS — G43.709 CHRONIC MIGRAINE WITHOUT AURA WITHOUT STATUS MIGRAINOSUS, NOT INTRACTABLE: ICD-10-CM

## 2022-03-09 DIAGNOSIS — E78.00 HYPERCHOLESTEROLEMIA: ICD-10-CM

## 2022-03-09 DIAGNOSIS — R07.89 OTHER CHEST PAIN: ICD-10-CM

## 2022-03-09 PROCEDURE — 3008F BODY MASS INDEX DOCD: CPT | Performed by: INTERNAL MEDICINE

## 2022-03-09 PROCEDURE — 99204 OFFICE O/P NEW MOD 45 MIN: CPT | Performed by: INTERNAL MEDICINE

## 2022-03-09 PROCEDURE — 1036F TOBACCO NON-USER: CPT | Performed by: INTERNAL MEDICINE

## 2022-03-09 PROCEDURE — 93000 ELECTROCARDIOGRAM COMPLETE: CPT | Performed by: INTERNAL MEDICINE

## 2022-03-09 RX ORDER — IBUPROFEN 400 MG/1
400 TABLET ORAL EVERY 8 HOURS SCHEDULED
Qty: 90 TABLET | Refills: 3
Start: 2022-03-09

## 2022-03-09 NOTE — PATIENT INSTRUCTIONS
Recommendations:  1  24 hour holter monitor to evaluate palpitations  2  Check echocardiogram to evaluate pericardium  3  Start ibuprofen 400mg 3x/day for 2 weeks for treatment of possible pericarditis  4  Contact me in one week to relay symptoms  If improving, add Colchicine 0 6mg daily  5  Follow up in one month

## 2022-03-09 NOTE — PROGRESS NOTES
Cardiology   Orin Kirk 64 y o  female MRN: [id number]        Impression:  1  Hypertension - now normotensive  2  Dyslipidemia - High LDL and HDL  3  Palpitations - likely PACs, but will want to evaluate for further dysrhythmias  4  Chest tightness - may have pericarditis  Recommendations:  1  24 hour holter monitor to evaluate palpitations  2  Check echocardiogram to evaluate pericardium  3  Start ibuprofen 400mg 3x/day for 2 weeks for treatment of possible pericarditis  4  Contact me in one week to relay symptoms  If improving, add Colchicine 0 6mg daily  5  Follow up in one month  HPI: Orin Kirk is a 64y o  year old female with hypertension, dyslipidemia, premature atrial contractions, and migraines, who presents for evaluation of palpitations  Saw Dr Adama Skinner 6 yrs ago for palpitations - holter monitor demonstrated just premature atrial contractions  Also with chest tightness, worse with laying flat  Also symptoms when taking deep breath  Has been occurring for 6 years  However, chest symptoms worse since COVID  Review of Systems   Constitutional: Negative  HENT: Negative  Eyes: Negative  Respiratory: Negative for chest tightness and shortness of breath  Cardiovascular: Positive for chest pain and palpitations  Negative for leg swelling  Gastrointestinal: Negative  Endocrine: Negative  Genitourinary: Negative  Musculoskeletal: Negative  Skin: Negative  Allergic/Immunologic: Negative  Neurological: Positive for headaches  Hematological: Negative  Psychiatric/Behavioral: Negative  All other systems reviewed and are negative          Past Medical History:   Diagnosis Date    Abnormal Pap smear of cervix 1989    all normal since    Asthma     Carbon monoxide exposure 2/6/2018    Chronic back pain     Lump of skin     last assessed 11/21/13    Migraine     Sinus bradycardia     last assessed 10/25/16    Varicella Past Surgical History:   Procedure Laterality Date    APPENDECTOMY      BREAST CYST EXCISION Right 2001    benign    COLONOSCOPY      TONSILLECTOMY      VARICOSE VEIN SURGERY      WISDOM TOOTH EXTRACTION Bilateral      Social History     Substance and Sexual Activity   Alcohol Use Yes    Alcohol/week: 7 0 standard drinks    Types: 7 Glasses of wine per week    Comment: social per [employer]     Social History     Substance and Sexual Activity   Drug Use No     Social History     Tobacco Use   Smoking Status Former Smoker    Quit date:     Years since quittin 2   Smokeless Tobacco Never Used     Family History   Problem Relation Age of Onset    Heart failure Mother         CHF    Heart attack Father     Colon cancer Father 48    Heart disease Sister     No Known Problems Sister     Hypertension Brother     Stroke Brother     No Known Problems Maternal Grandmother     Lung cancer Maternal Grandfather     No Known Problems Paternal Grandmother     No Known Problems Paternal Grandfather     No Known Problems Son     No Known Problems Son     No Known Problems Son     Diabetes Maternal Aunt     Stroke Maternal Aunt     No Known Problems Paternal Aunt     No Known Problems Paternal Aunt     No Known Problems Paternal Aunt     Arthritis Family     Breast cancer Neg Hx        Allergies:   Allergies   Allergen Reactions    Penicillins     Doxycycline     Erythromycin     Other      Mushrooms       Medications:     Current Outpatient Medications:     Ascorbic Acid (CEDRICK-C PO), Take by mouth, Disp: , Rfl:     aspirin 81 mg chewable tablet, Chew 81 mg daily, Disp: , Rfl:     Botox 200 units SOLR, , Disp: , Rfl:     cetirizine (ZyrTEC) 10 mg tablet, Take 1 tablet (10 mg total) by mouth daily, Disp: 90 tablet, Rfl: 1    fluticasone (FLONASE) 50 mcg/act nasal spray, 2 sprays into each nostril daily, Disp: 16 mL, Rfl: 1    Iodine, Kelp, (KELP PO), Take 1 tablet by mouth daily  , Disp: , Rfl:     ipratropium (ATROVENT) 0 03 % nasal spray, 2 sprays into each nostril every 12 (twelve) hours, Disp: 30 mL, Rfl: 1    ketorolac (TORADOL) 10 mg tablet, 1 tab q6 hours prn migraine (with compazine if needed)  Max 2 per day and 3 per week , Disp: 15 tablet, Rfl: 2    Magnesium 200 MG TABS, Take 200 mg by mouth 2 (two) times a day, Disp: , Rfl:     meclizine (ANTIVERT) 12 5 MG tablet, 1 tab qhs prn dizziness and up to TID as needed, Disp: 30 tablet, Rfl: 0    MULTIPLE VITAMINS ESSENTIAL PO, Take by mouth, Disp: , Rfl:     prochlorperazine (COMPAZINE) 10 mg tablet, Take 1 tablet (10 mg total) by mouth every 6 (six) hours as needed for nausea or vomiting, Disp: 20 tablet, Rfl: 2    venlafaxine (EFFEXOR) 25 mg tablet, 25 mg qam and 25 mg qhs, Disp: 180 tablet, Rfl: 1      Wt Readings from Last 3 Encounters:   03/09/22 80 3 kg (177 lb)   12/14/21 80 6 kg (177 lb 12 8 oz)   11/16/21 80 5 kg (177 lb 6 4 oz)     Temp Readings from Last 3 Encounters:   12/14/21 97 9 °F (36 6 °C) (Temporal)   11/16/21 (!) 95 4 °F (35 2 °C) (Tympanic)   09/09/21 98 1 °F (36 7 °C) (Temporal)     BP Readings from Last 3 Encounters:   03/09/22 122/82   12/14/21 152/67   11/16/21 139/69     Pulse Readings from Last 3 Encounters:   03/09/22 71   12/14/21 64   11/16/21 71         Physical Exam  HENT:      Head: Atraumatic  Mouth/Throat:      Mouth: Mucous membranes are moist    Eyes:      Extraocular Movements: Extraocular movements intact  Cardiovascular:      Rate and Rhythm: Normal rate and regular rhythm  Heart sounds: Normal heart sounds  Pulmonary:      Effort: Pulmonary effort is normal       Breath sounds: Normal breath sounds  Abdominal:      General: Abdomen is flat  Musculoskeletal:         General: Normal range of motion  Cervical back: Normal range of motion  Skin:     General: Skin is warm  Neurological:      General: No focal deficit present        Mental Status: She is alert and oriented to person, place, and time     Psychiatric:         Mood and Affect: Mood normal          Behavior: Behavior normal            Laboratory Studies:  CMP:  Lab Results   Component Value Date     12/18/2015    K 4 6 02/12/2022     02/12/2022    CO2 30 02/12/2022    ANIONGAP 10 12/18/2015    BUN 14 02/12/2022    CREATININE 0 83 02/12/2022    GLUCOSE 92 12/18/2015    AST 19 03/11/2021    ALT 31 03/11/2021    BILITOT 0 64 12/18/2015    EGFR 79 02/12/2022       Lipid Profile:   No results found for: CHOL  Lab Results   Component Value Date    HDL 85 02/12/2022     Lab Results   Component Value Date    LDLCALC 154 (H) 02/12/2022     Lab Results   Component Value Date    TRIG 89 02/12/2022       Cardiac testing:   EKG reviewed personally: GUILLERMO Mobley NSSTWA

## 2022-03-11 ENCOUNTER — HOSPITAL ENCOUNTER (OUTPATIENT)
Dept: NON INVASIVE DIAGNOSTICS | Facility: CLINIC | Age: 57
Discharge: HOME | End: 2022-03-11
Payer: COMMERCIAL

## 2022-03-11 VITALS
HEIGHT: 67 IN | DIASTOLIC BLOOD PRESSURE: 82 MMHG | SYSTOLIC BLOOD PRESSURE: 122 MMHG | WEIGHT: 177 LBS | HEART RATE: 50 BPM | BODY MASS INDEX: 27.78 KG/M2

## 2022-03-11 DIAGNOSIS — R07.89 OTHER CHEST PAIN: ICD-10-CM

## 2022-03-11 DIAGNOSIS — I10 ESSENTIAL HYPERTENSION: ICD-10-CM

## 2022-03-11 LAB
AORTIC ROOT: 2.4 CM
APICAL FOUR CHAMBER EJECTION FRACTION: 61 %
E WAVE DECELERATION TIME: 289 MS
FRACTIONAL SHORTENING: 36 % (ref 28–44)
INTERVENTRICULAR SEPTUM IN DIASTOLE (PARASTERNAL SHORT AXIS VIEW): 0.9 CM
INTERVENTRICULAR SEPTUM: 0.9 CM (ref 0.53–0.99)
LEFT ATRIUM AREA SYSTOLE SINGLE PLANE A4C: 15 CM2
LEFT ATRIUM SIZE: 3.6 CM
LEFT INTERNAL DIMENSION IN SYSTOLE: 3.2 CM (ref 2.8–4.24)
LEFT VENTRICULAR INTERNAL DIMENSION IN DIASTOLE: 5 CM (ref 4.6–6.85)
LEFT VENTRICULAR POSTERIOR WALL IN END DIASTOLE: 0.9 CM (ref 0.52–0.98)
LEFT VENTRICULAR STROKE VOLUME: 76 ML
LVSV (TEICH): 76 ML
MV E'TISSUE VEL-SEP: 6 CM/S
MV PEAK A VEL: 0.72 M/S
MV PEAK E VEL: 65 CM/S
MV STENOSIS PRESSURE HALF TIME: 84 MS
MV VALVE AREA P 1/2 METHOD: 2.62 CM2
RIGHT ATRIUM AREA SYSTOLE A4C: 12.7 CM2
RIGHT VENTRICLE ID DIMENSION: 3.3 CM
SL CV PED ECHO LEFT VENTRICLE DIASTOLIC VOLUME (MOD BIPLANE) 2D: 117 ML
SL CV PED ECHO LEFT VENTRICLE SYSTOLIC VOLUME (MOD BIPLANE) 2D: 41 ML
TR MAX PG: 19 MMHG
TR PEAK VELOCITY: 2.2 M/S
TRICUSPID VALVE PEAK REGURGITATION VELOCITY: 2.15 M/S
Z-SCORE OF INTERVENTRICULAR SEPTUM IN END DIASTOLE: 1.2
Z-SCORE OF LEFT VENTRICULAR DIMENSION IN END DIASTOLE: -1.15
Z-SCORE OF LEFT VENTRICULAR DIMENSION IN END SYSTOLE: -0.59
Z-SCORE OF LEFT VENTRICULAR POSTERIOR WALL IN END DIASTOLE: 1.31

## 2022-03-11 PROCEDURE — 93306 TTE W/DOPPLER COMPLETE: CPT | Performed by: INTERNAL MEDICINE

## 2022-03-11 PROCEDURE — 93306 TTE W/DOPPLER COMPLETE: CPT

## 2022-03-15 ENCOUNTER — PROCEDURE VISIT (OUTPATIENT)
Dept: NEUROLOGY | Facility: CLINIC | Age: 57
End: 2022-03-15
Payer: COMMERCIAL

## 2022-03-15 VITALS — DIASTOLIC BLOOD PRESSURE: 68 MMHG | TEMPERATURE: 97.1 F | SYSTOLIC BLOOD PRESSURE: 144 MMHG | HEART RATE: 55 BPM

## 2022-03-15 DIAGNOSIS — G43.709 CHRONIC MIGRAINE WITHOUT AURA WITHOUT STATUS MIGRAINOSUS, NOT INTRACTABLE: Primary | ICD-10-CM

## 2022-03-15 PROCEDURE — 64615 CHEMODENERV MUSC MIGRAINE: CPT | Performed by: PHYSICIAN ASSISTANT

## 2022-03-15 NOTE — PROGRESS NOTES
Universal Protocol   Consent: Verbal consent obtained  Written consent obtained    Risks and benefits: risks, benefits and alternatives were discussed  Consent given by: patient  Patient understanding: patient states understanding of the procedure being performed  Patient consent: the patient's understanding of the procedure matches consent given  Procedure consent: procedure consent matches procedure scheduled        Chemodenervation     Date/Time 3/15/2022 9:04 AM     Performed by  Ana Barreto PA-C     Authorized by Ana Barreto PA-C        Pre-procedure details      Prepped With: Alcohol     Procedure details     Position:  Upright   Botox     Botox Type:  Type A    Brand:  Botox    mL's of Botulinum Toxin:  175    Final Concentration per CC:  100 units    Needle Gauge:  30 G 2 5 inch   Procedures     Botox Procedures: chronic headache      Indications: migraines     Injection Location      Head / Face:  L superior trapezius, R superior trapezius, L superior cervical paraspinal, R superior cervical paraspinal, L , R , procerus, L temporalis, R temporalis, R frontalis, L frontalis, R medial occipitalis and L medial occipitalis    L  injection amount:  5 unit(s)    R  injection amount:  5 unit(s)    L lateral frontalis:  5 unit(s)    R lateral frontalis:  5 unit(s)    L medial frontalis:  5 unit(s)    R medial frontalis:  5 unit(s)    L temporalis injection amount:  20 unit(s)    R temporalis injection amount:  20 unit(s)    Procerus injection amount:  5 unit(s)    L medial occipitalis injection amount:  15 unit(s)    R medial occipitalis injection amount:  15 unit(s)    L superior cervical paraspinal injection amount:  10 unit(s)    R superior cervical paraspinal injection amount:  10 unit(s)    L superior trapezius injection amount:  15 unit(s)    R superior trapezius injection amount:  15 unit(s)   Total Units     Total units used:  175    Total units discarded: 25   Post-procedure details      Chemodenervation:  Chronic migraine    Facial Nerve Location[de-identified]  Bilateral facial nerve    Patient tolerance of procedure: Tolerated well, no immediate complications   Comments      Extra units medically necessary:  - 20 in the scalp t/o       Blood pressure 144/68, pulse 55, temperature (!) 97 1 °F (36 2 °C), temperature source Tympanic, not currently breastfeeding

## 2022-03-17 DIAGNOSIS — I30.0 ACUTE IDIOPATHIC PERICARDITIS: Primary | ICD-10-CM

## 2022-03-17 RX ORDER — COLCHICINE 0.6 MG/1
0.6 TABLET ORAL DAILY
Qty: 90 TABLET | Refills: 3 | Status: SHIPPED | OUTPATIENT
Start: 2022-03-17 | End: 2022-04-27 | Stop reason: SDUPTHER

## 2022-03-25 ENCOUNTER — HOSPITAL ENCOUNTER (OUTPATIENT)
Dept: NON INVASIVE DIAGNOSTICS | Facility: CLINIC | Age: 57
Discharge: HOME | End: 2022-03-25
Payer: COMMERCIAL

## 2022-03-25 DIAGNOSIS — R00.2 PALPITATIONS: ICD-10-CM

## 2022-03-25 PROCEDURE — 93226 XTRNL ECG REC<48 HR SCAN A/R: CPT

## 2022-03-25 PROCEDURE — 93225 XTRNL ECG REC<48 HRS REC: CPT

## 2022-03-29 ENCOUNTER — TELEPHONE (OUTPATIENT)
Dept: CARDIOLOGY CLINIC | Facility: CLINIC | Age: 57
End: 2022-03-29

## 2022-03-29 NOTE — TELEPHONE ENCOUNTER
Called patient and left message  echo looks good  Normal cardiac function with no fluid around the heart        ----- Message from Nick Mijares MD sent at 3/23/2022  8:15 AM EDT -----  Please let patient know echo looks good  Normal cardiac function with no fluid around the heart  Thanks

## 2022-03-30 PROCEDURE — 93227 XTRNL ECG REC<48 HR R&I: CPT | Performed by: INTERNAL MEDICINE

## 2022-04-12 ENCOUNTER — TELEPHONE (OUTPATIENT)
Dept: CARDIOLOGY CLINIC | Facility: CLINIC | Age: 57
End: 2022-04-12

## 2022-04-12 NOTE — TELEPHONE ENCOUNTER
Called patient and gave results  ----- Message from Murtaza Jaimes MD sent at 4/5/2022  1:10 PM EDT -----  Please call patient to let her know her holter monitor was completely normal   No abnormal heart rhythms   Thanks

## 2022-04-26 DIAGNOSIS — G43.709 CHRONIC MIGRAINE WITHOUT AURA WITHOUT STATUS MIGRAINOSUS, NOT INTRACTABLE: ICD-10-CM

## 2022-04-26 RX ORDER — KETOROLAC TROMETHAMINE 10 MG/1
TABLET, FILM COATED ORAL
Qty: 15 TABLET | Refills: 0 | Status: CANCELLED | OUTPATIENT
Start: 2022-04-26

## 2022-04-27 ENCOUNTER — OFFICE VISIT (OUTPATIENT)
Dept: CARDIOLOGY CLINIC | Facility: MEDICAL CENTER | Age: 57
End: 2022-04-27
Payer: COMMERCIAL

## 2022-04-27 VITALS
DIASTOLIC BLOOD PRESSURE: 90 MMHG | BODY MASS INDEX: 28.41 KG/M2 | WEIGHT: 181 LBS | HEIGHT: 67 IN | SYSTOLIC BLOOD PRESSURE: 142 MMHG | OXYGEN SATURATION: 99 % | HEART RATE: 65 BPM

## 2022-04-27 DIAGNOSIS — I10 ESSENTIAL HYPERTENSION: Primary | ICD-10-CM

## 2022-04-27 DIAGNOSIS — E78.00 HYPERCHOLESTEROLEMIA: ICD-10-CM

## 2022-04-27 DIAGNOSIS — R07.89 OTHER CHEST PAIN: ICD-10-CM

## 2022-04-27 DIAGNOSIS — I30.0 ACUTE IDIOPATHIC PERICARDITIS: ICD-10-CM

## 2022-04-27 DIAGNOSIS — I49.1 PAC (PREMATURE ATRIAL CONTRACTION): ICD-10-CM

## 2022-04-27 PROCEDURE — 1036F TOBACCO NON-USER: CPT | Performed by: INTERNAL MEDICINE

## 2022-04-27 PROCEDURE — 99214 OFFICE O/P EST MOD 30 MIN: CPT | Performed by: INTERNAL MEDICINE

## 2022-04-27 PROCEDURE — 3008F BODY MASS INDEX DOCD: CPT | Performed by: INTERNAL MEDICINE

## 2022-04-27 RX ORDER — PROCHLORPERAZINE MALEATE 10 MG
10 TABLET ORAL EVERY 6 HOURS PRN
Qty: 20 TABLET | Refills: 0 | Status: SHIPPED | OUTPATIENT
Start: 2022-04-27

## 2022-04-27 RX ORDER — VENLAFAXINE 25 MG/1
TABLET ORAL
Qty: 180 TABLET | Refills: 0 | Status: SHIPPED | OUTPATIENT
Start: 2022-04-27 | End: 2022-06-20

## 2022-04-27 RX ORDER — COLCHICINE 0.6 MG/1
0.6 TABLET ORAL DAILY
Qty: 90 TABLET | Refills: 3 | Status: SHIPPED | OUTPATIENT
Start: 2022-04-27

## 2022-04-27 NOTE — PATIENT INSTRUCTIONS
Recommendations:  1  Continue colchicine  2  Continue remainder of medications  3  Follow up in 3 months

## 2022-04-27 NOTE — TELEPHONE ENCOUNTER
Patient of Katy Jamse PA-C last visit 11/16/2021; f/u 7/7/2022    Ketorolac not due for refill  Prochlorperazine due for refill  Venlafaxine  due until 5/16    I called pharmacist also to clarify; He said 30 tablets available on ketorolac and he will prepare  I cancelled  script for ketorolac; please   sign prochlorperazine and venlafaxine if in agreement, thank you

## 2022-04-27 NOTE — PROGRESS NOTES
Cardiology   Lorena Ray 64 y o  female MRN: [id number]          Impression:  1  Hypertension - borderline  2  Dyslipidemia - High LDL and HDL  3  Palpitations - likely PACs, but will want to evaluate for further dysrhythmias  4  Chest tightness - likely pericarditis  Significant improvement  Recommendations:  1  Continue colchicine  2  Continue remainder of medications  3  Follow up in 3 months  HPI: Lorena Ray is a 64y o  year old female with hypertension, dyslipidemia, premature atrial contractions, and migraines, who presents for follow up  Saw Dr Taisha Ivory 6 yrs ago for palpitations - holter monitor demonstrated just premature atrial contractions  Also with chest tightness, worse with laying flat  Also symptoms when taking deep breath  Has been occurring for 6 years  However, chest symptoms worse since COVID  Echo demonstrated normal cardiac function with mild mitral regurgitation, and holter demonstrated no dysrhythmias  Does have improvement in symptoms  Still with some chest pain when lying on back             Review of Systems      Past Medical History:   Diagnosis Date    Abnormal Pap smear of cervix 1989    all normal since    Asthma     Carbon monoxide exposure 2/6/2018    Chronic back pain     Lump of skin     last assessed 11/21/13    Migraine     Sinus bradycardia     last assessed 10/25/16    Varicella      Past Surgical History:   Procedure Laterality Date    APPENDECTOMY      BREAST CYST EXCISION Right 2001    benign    COLONOSCOPY      TONSILLECTOMY      VARICOSE VEIN SURGERY      WISDOM TOOTH EXTRACTION Bilateral      Social History     Substance and Sexual Activity   Alcohol Use Yes    Alcohol/week: 7 0 standard drinks    Types: 7 Glasses of wine per week    Comment: social per [employer]     Social History     Substance and Sexual Activity   Drug Use No     Social History     Tobacco Use   Smoking Status Former Smoker    Quit date: 1998  Years since quittin 3   Smokeless Tobacco Never Used     Family History   Problem Relation Age of Onset    Heart failure Mother         CHF    Heart attack Father     Colon cancer Father 48    Heart disease Sister     No Known Problems Sister     Hypertension Brother     Stroke Brother     No Known Problems Maternal Grandmother     Lung cancer Maternal Grandfather     No Known Problems Paternal Grandmother     No Known Problems Paternal Grandfather     No Known Problems Son     No Known Problems Son     No Known Problems Son     Diabetes Maternal Aunt     Stroke Maternal Aunt     No Known Problems Paternal Aunt     No Known Problems Paternal Aunt     No Known Problems Paternal Aunt     Arthritis Family     Breast cancer Neg Hx        Allergies: Allergies   Allergen Reactions    Penicillins     Doxycycline     Erythromycin     Other      Mushrooms       Medications:     Current Outpatient Medications:     Ascorbic Acid (CEDRICK-C PO), Take by mouth, Disp: , Rfl:     aspirin 81 mg chewable tablet, Chew 81 mg daily, Disp: , Rfl:     Botox 200 units SOLR, , Disp: , Rfl:     cetirizine (ZyrTEC) 10 mg tablet, Take 1 tablet (10 mg total) by mouth daily, Disp: 90 tablet, Rfl: 1    colchicine (COLCRYS) 0 6 mg tablet, Take 1 tablet (0 6 mg total) by mouth daily, Disp: 90 tablet, Rfl: 3    fluticasone (FLONASE) 50 mcg/act nasal spray, 2 sprays into each nostril daily, Disp: 16 mL, Rfl: 1    ibuprofen (MOTRIN) 400 mg tablet, Take 1 tablet (400 mg total) by mouth every 8 (eight) hours, Disp: 90 tablet, Rfl: 3    Iodine, Kelp, (KELP PO), Take 1 tablet by mouth daily  , Disp: , Rfl:     ipratropium (ATROVENT) 0 03 % nasal spray, 2 sprays into each nostril every 12 (twelve) hours, Disp: 30 mL, Rfl: 1    ketorolac (TORADOL) 10 mg tablet, 1 tab q6 hours prn migraine (with compazine if needed)   Max 2 per day and 3 per week , Disp: 15 tablet, Rfl: 2    Magnesium 200 MG TABS, Take 200 mg by mouth 2 (two) times a day, Disp: , Rfl:     meclizine (ANTIVERT) 12 5 MG tablet, 1 tab qhs prn dizziness and up to TID as needed, Disp: 30 tablet, Rfl: 0    MULTIPLE VITAMINS ESSENTIAL PO, Take by mouth, Disp: , Rfl:     prochlorperazine (COMPAZINE) 10 mg tablet, Take 1 tablet (10 mg total) by mouth every 6 (six) hours as needed for nausea or vomiting, Disp: 20 tablet, Rfl: 2    venlafaxine (EFFEXOR) 25 mg tablet, 25 mg qam and 25 mg qhs, Disp: 180 tablet, Rfl: 1      Wt Readings from Last 3 Encounters:   04/27/22 82 1 kg (181 lb)   03/11/22 80 3 kg (177 lb)   03/09/22 80 3 kg (177 lb)     Temp Readings from Last 3 Encounters:   03/15/22 (!) 97 1 °F (36 2 °C) (Tympanic)   12/14/21 97 9 °F (36 6 °C) (Temporal)   11/16/21 (!) 95 4 °F (35 2 °C) (Tympanic)     BP Readings from Last 3 Encounters:   04/27/22 142/90   03/15/22 144/68   03/11/22 122/82     Pulse Readings from Last 3 Encounters:   04/27/22 65   03/15/22 55   03/11/22 (!) 50         Physical Exam  HENT:      Head: Atraumatic  Mouth/Throat:      Mouth: Mucous membranes are moist    Eyes:      Extraocular Movements: Extraocular movements intact  Cardiovascular:      Rate and Rhythm: Normal rate and regular rhythm  Heart sounds: Normal heart sounds  Pulmonary:      Effort: Pulmonary effort is normal       Breath sounds: Normal breath sounds  Abdominal:      General: Abdomen is flat  Musculoskeletal:         General: Normal range of motion  Cervical back: Normal range of motion  Skin:     General: Skin is warm  Neurological:      General: No focal deficit present  Mental Status: She is alert and oriented to person, place, and time     Psychiatric:         Mood and Affect: Mood normal          Behavior: Behavior normal            Laboratory Studies:  CMP:  Lab Results   Component Value Date     12/18/2015    K 4 6 02/12/2022     02/12/2022    CO2 30 02/12/2022    ANIONGAP 10 12/18/2015    BUN 14 02/12/2022 CREATININE 0 83 02/12/2022    GLUCOSE 92 12/18/2015    AST 19 03/11/2021    ALT 31 03/11/2021    BILITOT 0 64 12/18/2015    EGFR 79 02/12/2022       Lipid Profile:   No results found for: CHOL  Lab Results   Component Value Date    HDL 85 02/12/2022     Lab Results   Component Value Date    LDLCALC 154 (H) 02/12/2022     Lab Results   Component Value Date    TRIG 89 02/12/2022

## 2022-05-19 ENCOUNTER — OFFICE VISIT (OUTPATIENT)
Dept: URGENT CARE | Facility: MEDICAL CENTER | Age: 57
End: 2022-05-19
Payer: COMMERCIAL

## 2022-05-19 VITALS
HEART RATE: 80 BPM | WEIGHT: 181 LBS | HEIGHT: 66 IN | RESPIRATION RATE: 18 BRPM | OXYGEN SATURATION: 99 % | BODY MASS INDEX: 29.09 KG/M2 | TEMPERATURE: 98 F

## 2022-05-19 DIAGNOSIS — S61.412A LACERATION OF LEFT HAND WITHOUT FOREIGN BODY, INITIAL ENCOUNTER: Primary | ICD-10-CM

## 2022-05-19 PROCEDURE — 99213 OFFICE O/P EST LOW 20 MIN: CPT | Performed by: NURSE PRACTITIONER

## 2022-05-19 PROCEDURE — 12001 RPR S/N/AX/GEN/TRNK 2.5CM/<: CPT | Performed by: NURSE PRACTITIONER

## 2022-05-19 NOTE — LETTER
May 19, 2022     Patient: Mamta Shepherd   YOB: 1965   Date of Visit: 5/19/2022       To Whom It May Concern: It is my medical opinion that Mickey Ellison may return to work on 5/25 with a 10 lb weight restriction until suture removal (5/29-6/2)  Please excuse from work 5/20, 5/23, 5/24  If you have any questions or concerns, please don't hesitate to call           Sincerely,        VENKATA Noland    CC: No Recipients

## 2022-05-19 NOTE — PROGRESS NOTES
[facility]        NAME: Shante Jim is a 64 y o  female  : 1965    MRN: [id number]  DATE: May 19, 2022  TIME: 6:57 PM      Assessment and Plan     Laceration of left hand without foreign body, initial encounter [S61 412A]  1  Laceration of left hand without foreign body, initial encounter  Laceration repair     Laceration repair    Date/Time: 2022 6:30 PM  Performed by: VENKATA Miramontes  Authorized by: VENKATA Miramontes   Consent: Verbal consent obtained  Risks and benefits: risks, benefits and alternatives were discussed  Consent given by: patient  Patient understanding: patient states understanding of the procedure being performed  Required items: required blood products, implants, devices, and special equipment available  Patient identity confirmed: verbally with patient  Time out: Immediately prior to procedure a "time out" was called to verify the correct patient, procedure, equipment, support staff and site/side marked as required  Body area: upper extremity  Location details: left hand  Laceration length: 2 cm  Foreign bodies: no foreign bodies  Tendon involvement: none  Nerve involvement: none  Vascular damage: no  Anesthesia: local infiltration (and topical)    Anesthesia:  Local Anesthetic: lidocaine 1% with epinephrine  Anesthetic total: 6 5 mL    Sedation:  Patient sedated: no        Procedure Details:  Preparation: Patient was prepped and draped in the usual sterile fashion    Irrigation solution: saline  Irrigation method: syringe  Amount of cleaning: extensive (explored for glass shards; none found)  Skin closure: 4-0 nylon  Number of sutures: 3 (3 = 6)  Technique: horizontal mattress  Approximation: close  Approximation difficulty: simple  Dressing: antibiotic ointment and gauze roll (abx ointment, telfa, roll gauze)  Patient tolerance: patient tolerated the procedure well with no immediate complications          Patient Instructions     Patient Instructions Keep the site covered for the first 24 hours  After that, showering is fine  Do not keep submerged in still water (pool, bathtub) or frequently under running water  Keep covered during any manual or dirty tasks  Allow some time open to air so it can dry out  Return for suture removal in 10-14 days  Care For Your Stitches   AMBULATORY CARE:   Stitches,  or sutures, are used to close cuts and wounds on the skin  Stitches need to be removed after your wound has healed  Seek care immediately if:   · Your stitches come apart  · Blood soaks through your bandages  · You suddenly cannot move your injured joint  · You have sudden numbness around your wound  · You see red streaks coming from your wound  Contact your healthcare provider if:   · You have a fever and chills  · Your wound is red, warm, swollen, or leaking pus  · There is a bad smell coming from your wound  · You have increased pain in the wound area  · You have questions or concerns about your condition or care  Care for your stitches:   · Protect the stitches  You may need to cover your stitches with a bandage for 24 to 48 hours, or as directed  Do not bump or hit the suture area  This could open the wound  Do not trim or shorten the ends of your stitches  If they rub on your clothing, put a gauze bandage between the stitches and your clothes  · Clean the area as directed  Carefully wash your wound with soap and water  For mouth and lip wounds, rinse your mouth after meals and at bedtime  Ask your healthcare provider what to use to rinse your mouth  If you have a scalp wound, you may gently wash your hair every 2 days with mild shampoo  Do not use hair products, such as hair spray  Check your wound for signs of infection when you clean it  Signs include redness, swelling, and pus  · Keep the area dry as directed  Wait 12 to 24 hours after you receive your stitches before you take a shower   Take showers instead of baths  Do not take a bath or swim  Your healthcare provider will give you instructions for bathing with your stitches  Help your wound heal:   · Elevate your wound  Keep your wound above the level of your heart as often as you can  This will help decrease swelling and pain  Prop the area on pillows or blankets, if possible, to keep it elevated comfortably  · Limit activity  Do not stretch the skin around your wound  This will help prevent bleeding and swelling  Follow up with your healthcare provider as directed: You may need to return to have your stitches removed  Write down your questions so you remember to ask them during your visits  © Copyright [employer] 2022 Information is for End User's use only and may not be sold, redistributed or otherwise used for commercial purposes  All illustrations and images included in CareNotes® are the copyrighted property of A D A M [employer] Inc  or Ibrahima Felipe   The above information is an  only  It is not intended as medical advice for individual conditions or treatments  Talk to your doctor, nurse or pharmacist before following any medical regimen to see if it is safe and effective for you  Follow up with PCP in 3-5 days  Proceed to  ER if symptoms worsen  Chief Complaint     Chief Complaint   Patient presents with    Hand Injury     Laceration to left hand on glass; TDAP up to date within the last 10 years; bleeding controlled and full sensation in finger tips          History of Present Illness     Patient dropped glass bottle top/plug in, cutting her left hand  She shows me pictures of 2 large intact pieces of glass and states she thinks all the glass is out  The glass and her hands were both clean  She is up to date on her tetanus  Review of Systems     Review of Systems   Skin: Positive for wound  All other systems reviewed and are negative          Current Medications       Current Outpatient Medications:   Ascorbic Acid (CEDRICK-C PO), Take by mouth, Disp: , Rfl:     aspirin 81 mg chewable tablet, Chew 81 mg daily, Disp: , Rfl:     Botox 200 units SOLR, , Disp: , Rfl:     cetirizine (ZyrTEC) 10 mg tablet, Take 1 tablet (10 mg total) by mouth daily, Disp: 90 tablet, Rfl: 1    colchicine (COLCRYS) 0 6 mg tablet, Take 1 tablet (0 6 mg total) by mouth daily, Disp: 90 tablet, Rfl: 3    fluticasone (FLONASE) 50 mcg/act nasal spray, 2 sprays into each nostril daily, Disp: 16 mL, Rfl: 1    ibuprofen (MOTRIN) 400 mg tablet, Take 1 tablet (400 mg total) by mouth every 8 (eight) hours, Disp: 90 tablet, Rfl: 3    Iodine, Kelp, (KELP PO), Take 1 tablet by mouth daily  , Disp: , Rfl:     ipratropium (ATROVENT) 0 03 % nasal spray, 2 sprays into each nostril every 12 (twelve) hours, Disp: 30 mL, Rfl: 1    ketorolac (TORADOL) 10 mg tablet, 1 tab q6 hours prn migraine (with compazine if needed)   Max 2 per day and 3 per week , Disp: 15 tablet, Rfl: 2    Magnesium 200 MG TABS, Take 200 mg by mouth 2 (two) times a day, Disp: , Rfl:     meclizine (ANTIVERT) 12 5 MG tablet, 1 tab qhs prn dizziness and up to TID as needed, Disp: 30 tablet, Rfl: 0    MULTIPLE VITAMINS ESSENTIAL PO, Take by mouth, Disp: , Rfl:     prochlorperazine (COMPAZINE) 10 mg tablet, Take 1 tablet (10 mg total) by mouth every 6 (six) hours as needed for nausea or vomiting, Disp: 20 tablet, Rfl: 0    venlafaxine (EFFEXOR) 25 mg tablet, 25 mg qam and 25 mg qhs, Disp: 180 tablet, Rfl: 0    Current Allergies     Allergies as of 05/19/2022 - Reviewed 05/19/2022   Allergen Reaction Noted    Penicillins  12/04/2016    Doxycycline  12/04/2016    Erythromycin  12/04/2016    Other  12/04/2016              The following portions of the patient's history were reviewed and updated as appropriate: allergies, current medications, past family history, past medical history, past social history, past surgical history and problem list      Past Medical History: Diagnosis Date    Abnormal Pap smear of cervix 1989    all normal since    Asthma     Carbon monoxide exposure 2/6/2018    Chronic back pain     Lump of skin     last assessed 11/21/13    Migraine     Sinus bradycardia     last assessed 10/25/16    Varicella        Past Surgical History:   Procedure Laterality Date    APPENDECTOMY      BREAST CYST EXCISION Right 2001    benign    COLONOSCOPY      TONSILLECTOMY      VARICOSE VEIN SURGERY      WISDOM TOOTH EXTRACTION Bilateral        Family History   Problem Relation Age of Onset    Heart failure Mother         CHF    Heart attack Father     Colon cancer Father 48    Heart disease Sister     No Known Problems Sister     Hypertension Brother     Stroke Brother     No Known Problems Maternal Grandmother     Lung cancer Maternal Grandfather     No Known Problems Paternal Grandmother     No Known Problems Paternal Grandfather     No Known Problems Son     No Known Problems Son     No Known Problems Son     Diabetes Maternal Aunt     Stroke Maternal Aunt     No Known Problems Paternal Aunt     No Known Problems Paternal Aunt     No Known Problems Paternal Aunt     Arthritis Family     Breast cancer Neg Hx          Medications have been verified  Objective     Pulse 80   Temp 98 °F (36 7 °C)   Resp 18   Ht 5' 6" (1 676 m)   Wt 82 1 kg (181 lb)   SpO2 99%   BMI 29 21 kg/m²   No LMP recorded  Physical Exam     Physical Exam  Vitals and nursing note reviewed  Constitutional:       General: She is not in acute distress  Appearance: Normal appearance  She is well-developed  She is not ill-appearing, toxic-appearing or diaphoretic  HENT:      Head: Normocephalic and atraumatic  Eyes:      Pupils: Pupils are equal, round, and reactive to light  Pulmonary:      Effort: Pulmonary effort is normal  No respiratory distress  Abdominal:      General: There is no distension  Palpations: Abdomen is soft  Musculoskeletal:         General: Normal range of motion  Right hand: Laceration and tenderness (at lac site only) present  No swelling, deformity or bony tenderness  Normal range of motion  Normal strength  Normal sensation  There is no disruption of two-point discrimination  Normal capillary refill  Normal pulse  Hands:       Cervical back: Normal range of motion and neck supple  Skin:     General: Skin is warm and dry  Capillary Refill: Capillary refill takes less than 2 seconds  Neurological:      General: No focal deficit present  Mental Status: She is alert and oriented to person, place, and time  Psychiatric:         Mood and Affect: Mood normal          Behavior: Behavior normal          Thought Content:  Thought content normal          Judgment: Judgment normal

## 2022-05-19 NOTE — PATIENT INSTRUCTIONS
Keep the site covered for the first 24 hours  After that, showering is fine  Do not keep submerged in still water (pool, bathtub) or frequently under running water  Keep covered during any manual or dirty tasks  Allow some time open to air so it can dry out  Return for suture removal in 10-14 days  Care For Your Stitches   AMBULATORY CARE:   Stitches,  or sutures, are used to close cuts and wounds on the skin  Stitches need to be removed after your wound has healed  Seek care immediately if:   Your stitches come apart  Blood soaks through your bandages  You suddenly cannot move your injured joint  You have sudden numbness around your wound  You see red streaks coming from your wound  Contact your healthcare provider if:   You have a fever and chills  Your wound is red, warm, swollen, or leaking pus  There is a bad smell coming from your wound  You have increased pain in the wound area  You have questions or concerns about your condition or care  Care for your stitches:   Protect the stitches  You may need to cover your stitches with a bandage for 24 to 48 hours, or as directed  Do not bump or hit the suture area  This could open the wound  Do not trim or shorten the ends of your stitches  If they rub on your clothing, put a gauze bandage between the stitches and your clothes  Clean the area as directed  Carefully wash your wound with soap and water  For mouth and lip wounds, rinse your mouth after meals and at bedtime  Ask your healthcare provider what to use to rinse your mouth  If you have a scalp wound, you may gently wash your hair every 2 days with mild shampoo  Do not use hair products, such as hair spray  Check your wound for signs of infection when you clean it  Signs include redness, swelling, and pus  Keep the area dry as directed  Wait 12 to 24 hours after you receive your stitches before you take a shower  Take showers instead of baths   Do not take a bath or swim  Your healthcare provider will give you instructions for bathing with your stitches  Help your wound heal:   Elevate your wound  Keep your wound above the level of your heart as often as you can  This will help decrease swelling and pain  Prop the area on pillows or blankets, if possible, to keep it elevated comfortably  Limit activity  Do not stretch the skin around your wound  This will help prevent bleeding and swelling  Follow up with your healthcare provider as directed: You may need to return to have your stitches removed  Write down your questions so you remember to ask them during your visits  © Copyright [employer] 2022 Information is for End User's use only and may not be sold, redistributed or otherwise used for commercial purposes  All illustrations and images included in CareNotes® are the copyrighted property of [employer]  or Ibrahima Segovia  The above information is an  only  It is not intended as medical advice for individual conditions or treatments  Talk to your doctor, nurse or pharmacist before following any medical regimen to see if it is safe and effective for you

## 2022-06-13 ENCOUNTER — TELEPHONE (OUTPATIENT)
Dept: NEUROLOGY | Facility: CLINIC | Age: 57
End: 2022-06-13

## 2022-06-13 NOTE — TELEPHONE ENCOUNTER
Called [facility] to schedule delivery of Botox 200 units  Spoke to [facility] she states medication is in review and will call back to schedule delivery  I asked that she vic this stat so we can get the patients medication in time

## 2022-06-15 NOTE — TELEPHONE ENCOUNTER
Received call from Anthony Ramos and scheduled delivery with Gianna for:    Botox-200 units  Qty:1  Delivery to [facility]  Scheduled for 6/16/2022  Via:[employer]    Please advise if medication doesn't arrive

## 2022-06-16 NOTE — TELEPHONE ENCOUNTER
Botox number of units: 200  Botox quantity: 1  Arrived at what location: [facility]  Botox at Correct Administering Location: Yes  Kathya [address]ey [address] number: [id number]  Lot number: [id number]  Expiration Date: 12/2024  Appt notes indicate correct medication: Yes  Botox logged in on the receiving log in sheet and stored in the refrigerator

## 2022-06-19 DIAGNOSIS — G43.709 CHRONIC MIGRAINE WITHOUT AURA WITHOUT STATUS MIGRAINOSUS, NOT INTRACTABLE: ICD-10-CM

## 2022-06-20 RX ORDER — VENLAFAXINE 25 MG/1
TABLET ORAL
Qty: 180 TABLET | Refills: 0 | Status: SHIPPED | OUTPATIENT
Start: 2022-06-20

## 2022-06-21 ENCOUNTER — PROCEDURE VISIT (OUTPATIENT)
Dept: NEUROLOGY | Facility: CLINIC | Age: 57
End: 2022-06-21
Payer: COMMERCIAL

## 2022-06-21 VITALS
WEIGHT: 184.6 LBS | HEART RATE: 58 BPM | SYSTOLIC BLOOD PRESSURE: 150 MMHG | TEMPERATURE: 97 F | DIASTOLIC BLOOD PRESSURE: 72 MMHG | BODY MASS INDEX: 29.8 KG/M2

## 2022-06-21 DIAGNOSIS — G43.709 CHRONIC MIGRAINE WITHOUT AURA WITHOUT STATUS MIGRAINOSUS, NOT INTRACTABLE: Primary | ICD-10-CM

## 2022-06-21 PROCEDURE — 64615 CHEMODENERV MUSC MIGRAINE: CPT | Performed by: PHYSICIAN ASSISTANT

## 2022-06-21 NOTE — PROGRESS NOTES
Universal Protocol   Consent: Verbal consent obtained  Written consent obtained    Risks and benefits: risks, benefits and alternatives were discussed  Consent given by: patient  Patient understanding: patient states understanding of the procedure being performed  Patient consent: the patient's understanding of the procedure matches consent given  Procedure consent: procedure consent matches procedure scheduled        Chemodenervation     Date/Time 6/21/2022 9:09 AM     Performed by  Irina Watson PA-C     Authorized by Irina Watson PA-C        Pre-procedure details      Prepped With: Alcohol     Procedure details     Position:  Upright   Botox     Botox Type:  Type A    Brand:  Botox    mL's of Botulinum Toxin:  200    Final Concentration per CC:  100 units    Needle Gauge:  30 G 2 5 inch   Procedures     Botox Procedures: chronic headache      Indications: migraines     Injection Location      Head / Face:  L superior trapezius, R superior trapezius, L superior cervical paraspinal, R superior cervical paraspinal, L , R , procerus, L temporalis, R temporalis, R frontalis, L frontalis, R medial occipitalis and L medial occipitalis    L  injection amount:  5 unit(s)    R  injection amount:  5 unit(s)    L lateral frontalis:  5 unit(s)    R lateral frontalis:  5 unit(s)    L medial frontalis:  5 unit(s)    R medial frontalis:  5 unit(s)    L temporalis injection amount:  20 unit(s)    R temporalis injection amount:  20 unit(s)    Procerus injection amount:  5 unit(s)    L medial occipitalis injection amount:  15 unit(s)    R medial occipitalis injection amount:  15 unit(s)    L superior cervical paraspinal injection amount:  10 unit(s)    R superior cervical paraspinal injection amount:  10 unit(s)    L superior trapezius injection amount:  15 unit(s)    R superior trapezius injection amount:  15 unit(s)   Total Units     Total units used:  200    Total units discarded:  0 Post-procedure details      Chemodenervation:  Chronic migraine    Facial Nerve Location[de-identified]  Bilateral facial nerve    Patient tolerance of procedure: Tolerated well, no immediate complications   Comments      Extra units medically necessary:  - 20 between both temporalis muscles  - 15 scalp/ apex  - 5 R upper traps, 5 L upper traps       Blood pressure 150/72, pulse 58, temperature (!) 97 °F (36 1 °C), temperature source Tympanic, weight 83 7 kg (184 lb 9 6 oz), not currently breastfeeding

## 2022-07-01 ENCOUNTER — TELEPHONE (OUTPATIENT)
Dept: NEUROLOGY | Facility: CLINIC | Age: 57
End: 2022-07-01

## 2022-07-07 ENCOUNTER — OFFICE VISIT (OUTPATIENT)
Dept: NEUROLOGY | Facility: CLINIC | Age: 57
End: 2022-07-07
Payer: COMMERCIAL

## 2022-07-07 VITALS
BODY MASS INDEX: 29.57 KG/M2 | HEIGHT: 66 IN | HEART RATE: 58 BPM | WEIGHT: 184 LBS | SYSTOLIC BLOOD PRESSURE: 154 MMHG | DIASTOLIC BLOOD PRESSURE: 80 MMHG

## 2022-07-07 DIAGNOSIS — G43.709 CHRONIC MIGRAINE WITHOUT AURA WITHOUT STATUS MIGRAINOSUS, NOT INTRACTABLE: Primary | ICD-10-CM

## 2022-07-07 DIAGNOSIS — E78.00 HYPERCHOLESTEROLEMIA: ICD-10-CM

## 2022-07-07 DIAGNOSIS — J30.2 SEASONAL ALLERGIES: ICD-10-CM

## 2022-07-07 DIAGNOSIS — I10 ESSENTIAL HYPERTENSION: ICD-10-CM

## 2022-07-07 DIAGNOSIS — G43.109 VERTIGINOUS MIGRAINE: ICD-10-CM

## 2022-07-07 DIAGNOSIS — Z77.29 CARBON MONOXIDE EXPOSURE: ICD-10-CM

## 2022-07-07 PROCEDURE — 99213 OFFICE O/P EST LOW 20 MIN: CPT | Performed by: PHYSICIAN ASSISTANT

## 2022-07-07 RX ORDER — FEXOFENADINE HCL 180 MG/1
180 TABLET ORAL 2 TIMES DAILY
COMMUNITY

## 2022-07-07 RX ORDER — FEXOFENADINE HCL AND PSEUDOEPHEDRINE HCI 60; 120 MG/1; MG/1
1 TABLET, EXTENDED RELEASE ORAL 2 TIMES DAILY
COMMUNITY
End: 2022-07-07 | Stop reason: CLARIF

## 2022-07-07 NOTE — PROGRESS NOTES
Patient ID: Sina Zavala is a 64 y o  female  Assessment/Plan:     Diagnoses and all orders for this visit:    Chronic migraine without aura without status migrainosus, not intractable    Vertiginous migraine    Carbon monoxide exposure    Hypercholesterolemia    Essential hypertension    Seasonal allergies    Other orders  -     Discontinue: fexofenadine-pseudoephedrine (ALLEGRA-D)  MG per tablet; Take 1 tablet by mouth 2 (two) times a day  -     fexofenadine (ALLEGRA) 180 MG tablet; Take 180 mg by mouth 2 (two) times a day          Migraines are improved with the current therapies  Continue botox q90 days  Since starting botox, the patient reports greater than 7 days of migraine relief from baseline, correlated with headache diary, decreased abortive medication use and decreased ER visits  Continue venlafaxine 25 mg immediate release, q  12 hours  Lower doses resulted in >HAs  PRN migraine onset- Toradol + compazine  Seasonal allergies are still a problem, causing some low grade headaches  If interested in the pt can trial cyproheptadine  She can contact me if she wants to try this  For elevated cholesterol/lipid panel, advised on dietary changes and weight loss  She is following with her PCP for this  The patient should not hesitate to call me prior to her follow up with any questions or concerns  Subjective:    HPI    Ms  Sina Zavala is here for neurological follow up      She has had virtually no migraines since last seen, Botox is significantly helpful  She reports frequent low-grade headaches, almost daily if not daily, probably triggered by seasonal allergies which have been worse this year  Low-grade headaches secondary to allergies are in the periorbital region and above her cheeks on both sides  When she gets a headache it can be diffuse, or in the sinus regions bilaterally  Typically it is both sides    She does have nausea, decreased appetite, light and sound sensitivity  Headache pain level is 6 to 7/10 on average  She has a lot of neck and upper back tension associated with the headaches (also a/w activity and moving arms a lot at work all day)  She uses heat packs  She denies vision changes  She denies any change in character of the headaches since last seen  HA/ migraine triggers- work/ stress and seasonal allergies     Migraines "auras" involve an episode of headache with dizziness and very sensitivity to high-pitched sounds, screeching, even her dogs barking   When she gets this symptom she develops confusion and disorientation   It lasts several hours and Toradol plus Reglan (or Compazine) cocktail helps      [facility]- follows up regularly with her eye dr her  Head injury: no, but 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  Frequency: 1-2 migraines per month; eye pain and eye dryness with allergies and sinus irritation- low grade headahes daily, behind the L>R, and sinus region    Triggers: as above  Aura/ warning:  -- sound sensitivity  Can hear things more acutely  Also noted above  No scotomas  Medications tried:  Prevention-  -- Ajovy was helpful but she had stopped taking it due to high co-pay-   Over 700 dollars with each injection  Venlafaxine  Cymbalta  Topamax  CGRP injectables  Gabapentin  Nortriptyline  Verapamil    Abortive-  Toradol  Compazine, Reglan  Meclizine  Fioricet  Decadron  Tylenol, ibuprofen, naproxen  Sumatriptan    Other non medication therapies:  Heating pad, electric massager    Recent labs:  , HDL 85, trig 89, chol 257    Brain MRI w/o 4/19/18: "Few subcortical white matter lesions, nonspecific  Early microangiopathic disease, sequela of migraine or vasculitis could have this appearance, in the appropriate clinical context  Demyelinating process is not excluded though distribution of lesions is not typical "    HCT w/o 3/11/21: unremarkable      The following portions of the patient's history were reviewed and updated as appropriate:   She  has a past medical history of Abnormal Pap smear of cervix (1989), Asthma, Carbon monoxide exposure (2/6/2018), Chronic back pain, Lump of skin, Migraine, Sinus bradycardia, and Varicella  She   Patient Active Problem List    Diagnosis Date Noted    Palpitations 03/09/2022    PAC (premature atrial contraction) 03/09/2022    Other chest pain 03/09/2022    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 08/24/2021    Depression, recurrent ([facility] [address] ) 08/23/2021    Carbon monoxide exposure 08/24/2020    Encounter for gynecological examination (general) (routine) without abnormal findings 07/06/2020    Pelvic pain in female 07/06/2020    History of lumpectomy 07/06/2020    Non-intractable vomiting 05/20/2020    Seasonal allergies 05/20/2020    Skin rash 05/20/2020    Polyarthritis of multiple sites 04/17/2020    Phonophobia 02/14/2020    Vertiginous migraine 04/22/2019    Overweight (BMI 25 0-29 9) 03/01/2019    Anxiety and depression 03/01/2019    Varicose veins of both lower extremities with pain 03/01/2019    PND (post-nasal drip) 03/01/2019    Chronic migraine without aura without status migrainosus, not intractable 10/08/2018    Vertigo 02/06/2018    Essential hypertension 10/10/2016    Hypercholesterolemia 10/22/2014     She  has a past surgical history that includes Appendectomy; Tonsillectomy; [place] tooth extraction (Bilateral); Colonoscopy; Varicose vein surgery; and Breast cyst excision (Right, 2001)  Her family history includes Arthritis in her family; Colon cancer (age of onset: 48) in her father; Diabetes in her maternal aunt; Heart attack in her father; Heart disease in her sister;  Heart failure in her mother; Hypertension in her brother; Lung cancer in her maternal grandfather; No Known Problems in her maternal grandmother, paternal aunt, paternal aunt, paternal aunt, paternal grandfather, paternal grandmother, sister, son, son, and son; Stroke in her brother and maternal aunt  She  reports that she quit smoking about 24 years ago  She has never used smokeless tobacco  She reports current alcohol use of about 7 0 standard drinks of alcohol per week  She reports that she does not use drugs  Current Outpatient Medications   Medication Sig Dispense Refill    Ascorbic Acid (CEDRICK-C PO) Take by mouth      aspirin 81 mg chewable tablet Chew 81 mg daily      Botox 200 units SOLR       colchicine (COLCRYS) 0 6 mg tablet Take 1 tablet (0 6 mg total) by mouth daily 90 tablet 3    fexofenadine (ALLEGRA) 180 MG tablet Take 180 mg by mouth 2 (two) times a day      fluticasone (FLONASE) 50 mcg/act nasal spray 2 sprays into each nostril daily 16 mL 1    ibuprofen (MOTRIN) 400 mg tablet Take 1 tablet (400 mg total) by mouth every 8 (eight) hours 90 tablet 3    Iodine, Kelp, (KELP PO) Take 1 tablet by mouth daily        ipratropium (ATROVENT) 0 03 % nasal spray 2 sprays into each nostril every 12 (twelve) hours 30 mL 1    ketorolac (TORADOL) 10 mg tablet 1 tab q6 hours prn migraine (with compazine if needed)  Max 2 per day and 3 per week  15 tablet 2    Magnesium 200 MG TABS Take 200 mg by mouth 2 (two) times a day      meclizine (ANTIVERT) 12 5 MG tablet 1 tab qhs prn dizziness and up to TID as needed 30 tablet 0    MULTIPLE VITAMINS ESSENTIAL PO Take by mouth      prochlorperazine (COMPAZINE) 10 mg tablet Take 1 tablet (10 mg total) by mouth every 6 (six) hours as needed for nausea or vomiting 20 tablet 0    venlafaxine (EFFEXOR) 25 mg tablet 25 MG IN THE MORNING AND 25 MG AT BEDTIME 180 tablet 0    cetirizine (ZyrTEC) 10 mg tablet Take 1 tablet (10 mg total) by mouth daily 90 tablet 1     No current facility-administered medications for this visit  She is allergic to penicillins, doxycycline, erythromycin, and other            Objective:    Blood pressure 154/80, pulse 58, height 5' 6" (1 676 m), weight 83 5 kg (184 lb), not currently breastfeeding    Body mass index is 29 7 kg/m²  Physical Exam    Neurological Exam  On neurologic exam, the patient is alert and oriented to time and place  Speech is fluent and articulate, and the patient follows commands appropriately  Judgment and affect appear normal  Pupils are equally round and reactive to light and extraocular muscles are intact without nystagmus  Face is symmetric, and tongue, uvula, and palate are midline  Hearing is intact  Motor examination reveals intact strength throughout  Normal gait is steady  ROS:    Review of Systems   Constitutional: Negative  Negative for appetite change and fever  HENT: Negative  Negative for hearing loss, tinnitus, trouble swallowing and voice change  Eyes: Negative  Negative for photophobia and pain  Respiratory: Negative  Negative for shortness of breath  Cardiovascular: Negative  Negative for palpitations  Gastrointestinal: Negative  Negative for nausea and vomiting  Endocrine: Negative  Negative for cold intolerance  Genitourinary: Negative  Negative for dysuria, frequency and urgency  Musculoskeletal: Negative  Negative for myalgias and neck pain  Skin: Negative  Negative for rash  Neurological: Negative  Negative for dizziness, tremors, seizures, syncope, facial asymmetry, speech difficulty, weakness, light-headedness, numbness and headaches  Hematological: Negative  Does not bruise/bleed easily  Psychiatric/Behavioral: Negative  Negative for confusion, hallucinations and sleep disturbance  All other systems reviewed and are negative  The following portions of the patient's history were reviewed and updated as appropriate: allergies, current medications/ medication history, past family history, past medical history, past social history, past surgical history and problem list     Review of systems was reviewed and otherwise unremarkable from a neurological perspective

## 2022-08-31 PROBLEM — R09.82 PND (POST-NASAL DRIP): Status: RESOLVED | Noted: 2019-03-01 | Resolved: 2022-08-31

## 2022-08-31 PROBLEM — Z77.29 CARBON MONOXIDE EXPOSURE: Status: RESOLVED | Noted: 2020-08-24 | Resolved: 2022-08-31

## 2022-08-31 PROBLEM — R11.10 NON-INTRACTABLE VOMITING: Status: RESOLVED | Noted: 2020-05-20 | Resolved: 2022-08-31

## 2022-08-31 PROBLEM — J30.2 SEASONAL ALLERGIES: Status: RESOLVED | Noted: 2020-05-20 | Resolved: 2022-08-31

## 2022-08-31 PROBLEM — R07.89 OTHER CHEST PAIN: Status: RESOLVED | Noted: 2022-03-09 | Resolved: 2022-08-31

## 2022-08-31 NOTE — PATIENT INSTRUCTIONS
Breast Self Exam for Women   AMBULATORY CARE:   A breast self-exam (BSE)  is a way to check your breasts for lumps and other changes  Regular BSEs can help you know how your breasts normally look and feel  Most breast lumps or changes are not cancer, but you should always have them checked by a healthcare provider  Why you should do a BSE:  Breast cancer is the most common type of cancer in women  Even if you have mammograms, you may still want to do a BSE regularly  If you know how your breasts normally feel and look, it may help you know when to contact your healthcare provider  Mammograms can miss some cancers  You may find a lump during a BSE that did not show up on a mammogram   When you should do a BSE:  If you have periods, you may want to do your BSE 1 week after your period ends  This is the time when your breasts may be the least swollen, lumpy, or tender  You can do regular BSEs even if you are breastfeeding or have breast implants  Call your doctor if:   You find any lumps or changes in your breasts  You have breast pain or fluid coming from your nipples  You have questions or concerns about your condition or care  How to do a BSE:       Look at your breasts in a mirror  Look at the size and shape of each breast and nipple  Check for swelling, lumps, dimpling, scaly skin, or other skin changes  Look for nipple changes, such as a nipple that is painful or beginning to pull inward  Gently squeeze both nipples and check to see if fluid (that is not breast milk) comes out of them  If you find any of these or other breast changes, contact your healthcare provider  Check your breasts while you sit or  the following 3 positions:    [place] your arms down at your sides  Raise your hands and join them behind your head  Put firm pressure with your hands on your hips  Bend slightly forward while you look at your breasts in the mirror  Lie down and feel your breasts    When you lie down, your breast tissue spreads out evenly over your chest  This makes it easier for you to feel for lumps and anything that may not be normal for your breasts  Do a BSE on one breast at a time  Place a small pillow or towel under your left shoulder  Put your left arm behind your head  Use the 3 middle fingers of your right hand  Use your fingertip pads, on the top of your fingers  Your fingertip pad is the most sensitive part of your finger  Use small circles to feel your breast tissue  Use your fingertip pads to make dime-sized, overlapping circles on your breast and armpits  Use light, medium, and firm pressure  First, press lightly  Second, press with medium pressure to feel a little deeper into the breast  Last, use firm pressure to feel deep within your breast     Examine your entire breast area  Examine the breast area from above the breast to below the breast where you feel only ribs  Make small circles with your fingertips, starting in the middle of your armpit  Make circles going up and down the breast area  Continue toward your breast and all the way across it  Examine the area from your armpit all the way over to the middle of your chest (breastbone)  Stop at the middle of your chest     Move the pillow or towel to your right shoulder, and put your right arm behind your head  Use the 3 fingertip pads of your left hand, and repeat the above steps to do a BSE on your right breast     What else you can do to check for breast problems or cancer:  Talk to your healthcare provider about mammograms  A mammogram is an x-ray of your breasts to screen for breast cancer or other problems  Your provider can tell you the benefits and risks of mammograms  The first mammogram is usually at age 39 or 48  Your provider may recommend you start at 36 or younger if your risk for breast cancer is high  Mammograms usually continue every 1 to 2 years until age 76         Follow up with your doctor as directed:  Write down your questions so you remember to ask them during your visits  © Copyright [employer] 2022 Information is for End User's use only and may not be sold, redistributed or otherwise used for commercial purposes  All illustrations and images included in CareNotes® are the copyrighted property of A D A M [employer] Inc  or Ibrahima Segovia  The above information is an  only  It is not intended as medical advice for individual conditions or treatments  Talk to your doctor, nurse or pharmacist before following any medical regimen to see if it is safe and effective for you  Wellness Visit for Adults   AMBULATORY CARE:   A wellness visit  is when you see your healthcare provider to get screened for health problems  Your healthcare provider will also give you advice on how to stay healthy  Write down your questions so you remember to ask them  Ask your healthcare provider how often you should have a wellness visit  What happens at a wellness visit:  Your healthcare provider will ask about your health, and your family history of health problems  This includes high blood pressure, heart disease, and cancer  He or she will ask if you have symptoms that concern you, if you smoke, and about your mood  You may also be asked about your intake of medicines, supplements, food, and alcohol  Any of the following may be done: Your weight  will be checked  Your height may also be checked so your body mass index (BMI) can be calculated  Your BMI shows if you are at a healthy weight  Your blood pressure  and heart rate will be checked  Your temperature may also be checked  Blood and urine tests  may be done  Blood tests may be done to check your cholesterol levels  Abnormal cholesterol levels increase your risk for heart disease and stroke  You may also need a blood or urine test to check for diabetes if you are at increased risk  Urine tests may be done to look for signs of an infection or kidney disease      A physical exam  includes checking your heartbeat and lungs with a stethoscope  Your healthcare provider may also check your skin to look for sun damage  Screening tests  may be recommended  A screening test is done to check for diseases that may not cause symptoms  The screening tests you may need depend on your age, gender, family history, and lifestyle habits  For example, colorectal screening may be recommended if you are 48years old or older  Screening tests you need if you are a woman:   A Pap smear  is used to screen for cervical cancer  Pap smears are usually done every 3 to 5 years depending on your age  You may need them more often if you have had abnormal Pap smear test results in the past  Ask your healthcare provider how often you should have a Pap smear  A mammogram  is an x-ray of your breasts to screen for breast cancer  Experts recommend mammograms every 2 years starting at age 48 years  You may need a mammogram at age 52 years or younger if you have an increased risk for breast cancer  Talk to your healthcare provider about when you should start having mammograms and how often you need them  Vaccines you may need:   Get an influenza vaccine  every year  The influenza vaccine protects you from the flu  Several types of viruses cause the flu  The viruses change over time, so new vaccines are made each year  Get a tetanus-diphtheria (Td) booster vaccine  every 10 years  This vaccine protects you against tetanus and diphtheria  Tetanus is a severe infection that may cause painful muscle spasms and lockjaw  Diphtheria is a severe bacterial infection that causes a thick covering in the back of your mouth and throat  Get a human papillomavirus (HPV) vaccine  if you are female and aged 23 to 32 or male 23 to 24 and never received it  This vaccine protects you from HPV infection  HPV is the most common infection spread by sexual contact   HPV may also cause vaginal, penile, and anal cancers  Get a pneumococcal vaccine  if you are aged 72 years or older  The pneumococcal vaccine is an injection given to protect you from pneumococcal disease  Pneumococcal disease is an infection caused by pneumococcal bacteria  The infection may cause pneumonia, meningitis, or an ear infection  Get a shingles vaccine  if you are 60 or older, even if you have had shingles before  The shingles vaccine is an injection to protect you from the varicella-zoster virus  This is the same virus that causes chickenpox  Shingles is a painful rash that develops in people who had chickenpox or have been exposed to the virus  How to eat healthy:  My Plate is a model for planning healthy meals  It shows the types and amounts of foods that should go on your plate  Fruits and vegetables make up about half of your plate, and grains and protein make up the other half  A serving of dairy is included on the side of your plate  The amount of calories and serving sizes you need depends on your age, gender, weight, and height  Examples of healthy foods are listed below:  Eat a variety of vegetables  such as dark green, red, and orange vegetables  You can also include canned vegetables low in sodium (salt) and frozen vegetables without added butter or sauces  Eat a variety of fresh fruits , canned fruit in 100% juice, frozen fruit, and dried fruit  Include whole grains  At least half of the grains you eat should be whole grains  Examples include whole-wheat bread, wheat pasta, brown rice, and whole-grain cereals such as oatmeal     Eat a variety of protein foods such as seafood (fish and shellfish), lean meat, and poultry without skin (turkey and chicken)  Examples of lean meats include pork leg, shoulder, or tenderloin, and beef round, sirloin, tenderloin, and extra lean ground beef  Other protein foods include eggs and egg substitutes, beans, peas, soy products, nuts, and seeds      Choose low-fat dairy products such as skim or 1% milk or low-fat yogurt, cheese, and cottage cheese  Limit unhealthy fats  such as butter, hard margarine, and shortening  Exercise:  Exercise at least 30 minutes per day on most days of the week  Some examples of exercise include walking, biking, dancing, and swimming  You can also fit in more physical activity by taking the stairs instead of the elevator or parking farther away from stores  Include muscle strengthening activities 2 days each week  Regular exercise provides many health benefits  It helps you manage your weight, and decreases your risk for type 2 diabetes, heart disease, stroke, and high blood pressure  Exercise can also help improve your mood  Ask your healthcare provider about the best exercise plan for you  General health and safety guidelines:   Do not smoke  Nicotine and other chemicals in cigarettes and cigars can cause lung damage  Ask your healthcare provider for information if you currently smoke and need help to quit  E-cigarettes or smokeless tobacco still contain nicotine  Talk to your healthcare provider before you use these products  Limit alcohol  A drink of alcohol is 12 ounces of beer, 5 ounces of wine, or 1½ ounces of liquor  Lose weight, if needed  Being overweight increases your risk of certain health conditions  These include heart disease, high blood pressure, type 2 diabetes, and certain types of cancer  Protect your skin  Do not sunbathe or use tanning beds  Use sunscreen with a SPF 15 or higher  Apply sunscreen at least 15 minutes before you go outside  Reapply sunscreen every 2 hours  Wear protective clothing, hats, and sunglasses when you are outside  Drive safely  Always wear your seatbelt  Make sure everyone in your car wears a seatbelt  A seatbelt can save your life if you are in an accident  Do not use your cell phone when you are driving  This could distract you and cause an accident   Pull over if you need to make a call or send a text message  Practice safe sex  Use latex condoms if are sexually active and have more than one partner  Your healthcare provider may recommend screening tests for sexually transmitted infections (STIs)  Wear helmets, lifejackets, and protective gear  Always wear a helmet when you ride a bike or motorcycle, go skiing, or play sports that could cause a head injury  Wear protective equipment when you play sports  Wear a lifejacket when you are on a boat or doing water sports  © Copyright [employer] 2022 Information is for End User's use only and may not be sold, redistributed or otherwise used for commercial purposes  All illustrations and images included in CareNotes® are the copyrighted property of A D A M , Inc  or [facility] Alison Felipe   The above information is an  only  It is not intended as medical advice for individual conditions or treatments  Talk to your doctor, nurse or pharmacist before following any medical regimen to see if it is safe and effective for you  Perineal Hygiene     No soaps or feminine wash to the vulva  Use only water to cleanse, or water with Dove or [employer] if necessary  No lotion to the area  Use only coconut oil for moisture if needed   No douching     [place] underware, loose fitting clothing  Only perfume-free, dye-free laundry detergent, use a second rinse cycle   Avoid fabric softeners/dryer sheets  Coconut oil as a lubricant (if not using condoms) or another scent-free lubricant (Astroglide, Uberlube) if needed  Partner to avoid the same products as well  Over the counter probiotic to restore vaginal wiley may be helpful as well     You may also look into Boric Acid vaginal suppositories to restore vaginal PH balance for up to 2 weeks as directed on the box  You may not use these if you are pregnant Kegel Exercises for Women   AMBULATORY CARE:   Kegel exercises  help strengthen your pelvic muscles   Pelvic muscles hold your pelvic organs, such as your bladder and uterus, in place  Kegel exercises help prevent or control problems with urine incontinence (leakage)  Incontinence may be caused by pregnancy, childbirth, or menopause  Contact your healthcare provider if:   You cannot feel your muscles tighten or relax  You continue to leak urine  You have questions or concerns about your condition or care  Use the correct muscles:  Pelvic muscles are the muscles you use to control urine flow  To target these muscles, stop and start the flow of urine several times  This will help you become familiar with how it feels to tighten and relax these muscles  How to do Kegel exercises:   Empty your bladder  You may lie down, stand up, or sit down to do these exercises  When you first try to do these exercises, it may be easier if you lie down  Tighten or squeeze your pelvic muscles slowly  It may feel like you are trying to hold back urine or gas  Hold this position for 3 seconds  Relax for 3 seconds  Repeat this cycle 10 times  Do 10 sets of Kegel exercises, at least 3 times a day  Do not hold your breath when you do Kegel exercises  Keep your stomach, back, and leg muscles relaxed  As your muscles get stronger, you will be able to hold the squeeze longer  Your healthcare provider may ask that you increase your pelvic muscle squeeze to 10 seconds  After you squeeze for 10 seconds, relax for 10 seconds  What else you should know:   Once you know how to do Kegel exercises, use different positions  You can do these exercises while you lie on the floor, sit at your desk or watch TV, and while you stand  You may notice improved bladder control within about 6 weeks  Tighten your pelvic muscles before you sneeze, cough, or lift to prevent urine leakage  Follow up with your doctor as directed:  Write down your questions so you remember to ask them during your visits     © Copyright [employer] 2022 Information is for End User's use only and may not be sold, redistributed or otherwise used for commercial purposes  All illustrations and images included in CareNotes® are the copyrighted property of A D A M , Inc  or Ibrahima Segovia  The above information is an  only  It is not intended as medical advice for individual conditions or treatments  Talk to your doctor, nurse or pharmacist before following any medical regimen to see if it is safe and effective for you  Menopause   WHAT YOU NEED TO KNOW:   What is menopause? Menopause is a normal stage in a woman's life when her monthly periods stop  You are considered to be in menopause when you have not had a period for a full year after the age of 36  Menopause usually occurs between ages 52 to 48  Perimenopause is a stage before menopause that may cause signs and symptoms similar to menopause  Perimenopause may start about 4 years before menopause  What causes menopause? Menopause starts when the ovaries stop making the female hormones estrogen and progesterone  After menopause, you are no longer able to become pregnant  Any of the following may trigger menopause or early menopause:  Surgery, including a hysterectomy or oophorectomy    Family history of early menopause    Smoking    Chemotherapy or pelvic radiation    Chromosome abnormalities, including Gale syndrome and Fragile X syndrome    Premature ovarian insufficiency (the ovaries stop producing eggs before age 36)    What are the signs and symptoms of menopause?   You may have any of the following:  Irregular menstrual cycles with heavy vaginal bleeding followed by decreased bleeding until it stops    Hot flashes (feeling warm, flushed, and sweaty)    Vaginal changes such as increased dryness    Mood changes such as anxiety, depression, or decreased desire to have sex    Trouble sleeping, joint pain, headaches    Brittle nails, hair on chin or chest where it is normally absent    Decrease in breast size and change in skin texture    Weight gain    How is menopause treated or managed? Hormone replacement therapy (HRT) is medicine that replaces your low hormone levels  HRT contains estrogen and sometimes progestin  HRT has several benefits  HRT helps prevent osteoporosis, which decreases your risk for bone fractures  HRT also protects you from colorectal cancer  HRT also has some risks  HRT increases your risk for breast cancer, blood clots, heart disease, a heart attack, or a stroke  If you are 72 years or older, HRT can also increase your risk for dementia  Your risk for uterine or endometrial cancer is higher if you take estrogen-only HRT  Manage hot flashes  Hot flashes are brief periods of feeling very warm, flushed, and sweaty  Hot flashes can last from a few seconds to several minutes  They may happen many times during the day, and are common at night  Layer your clothing so that you can easily remove some clothing and cool yourself during a hot flash  Cold drinks may also be helpful  Non-hormone medicines can help relieve or prevent hot flashes  Examples include certain antidepressants, nerve medicines, and high blood pressure medicines  Reduce vaginal dryness by using over-the-counter vaginal creams  Vaginal dryness may cause you to have pain or discomfort during sex  Only use creams that are made for vaginal use  Do  not  use petroleum jelly  You may put an estrogen cream in and around your vagina  Estrogen cream may help decrease vaginal dryness and lower your risk of vaginal infections  Continue to use birth control during perimenopause if you do not want to get pregnant  You may need to use birth control until it has been 1 year since your periods stopped  Ask your healthcare provider when you can stop using birth control to prevent pregnancy  How can I live a healthy lifestyle during and after menopause?   After menopause, your risk for heart disease and bone loss increases  Ask about these and other ways to stay healthy:  Exercise regularly  Exercise helps you maintain a healthy weight  Exercise can also help to control your blood pressure and cholesterol levels  Include weight-bearing exercise for strong bones  Weight bearing exercise is recommended for at least 30 minutes, 3 times a week  Ask your healthcare provider about the best exercise plan for you  Eat a variety of healthy foods  Include fruits, vegetables, whole grains (whole-wheat bread, pasta, and cereals), low-fat dairy, and lean protein foods (beans, poultry, and fish)  Limit foods high in sodium (salt)  Ask your healthcare provider for more information about a meal plan that is right for you  Do not smoke  If you smoke, it is never too late to quit  You are more likely to have a heart attack, lung disease, blood clots, and cancer if you smoke  Ask your healthcare provider for information if you need help quitting  Take supplements as directed  You may need extra calcium and vitamin D to help prevent osteoporosis  Limit alcohol and caffeine  Alcohol and caffeine may worsen your symptoms  When should I call my doctor? You have vaginal bleeding after menopause  You have questions or concerns about your condition or care  CARE AGREEMENT:   You have the right to help plan your care  Learn about your health condition and how it may be treated  Discuss treatment options with your healthcare providers to decide what care you want to receive  You always have the right to refuse treatment  The above information is an  only  It is not intended as medical advice for individual conditions or treatments  Talk to your doctor, nurse or pharmacist before following any medical regimen to see if it is safe and effective for you    © Copyright [employer] 2022 Information is for End User's use only and may not be sold, redistributed or otherwise used for commercial purposes  All illustrations and images included in CareNotes® are the copyrighted property of A D A M , Inc  or [facility] Alison Segovia  For vaginal dryness: You may use:     Coconut oil (organic, pure, unscented) as needed for moisture or lubrication  ( Do not use if allergic)       Replens moisture restore external comfort gel daily ( use as directed on the box)        Replens long lasting vaginal moisturizer  ( use as directed on the box)       For Vaginal Lubrication:        You may use:     Coconut oil (organic, pure, unscented) as needed for lubrication during intercourse  (Do not use if allergic)               Replens silky smooth lubricant, premium silicone based lubricant for intercourse  ( use as directed, a small amount will provide an enhanced natural feeling)     Any premium over the counter vaginal lubricant water or silicone based  Silicone based will have more staying power  For Vulvar hygiene:     No soaps or feminine wash to the vulva with the exception of Dove or Dove Sensitive Skin bar soap if necessary  Only perfume-free, dye-free laundry detergent, use a second rinse cycle  Avoid fabric softeners/dryer sheets  No lotion to the area  No Douching   Coconut oil as a lubricant (if not using condoms) or another scent-free premium lubricant  Loose fitting cotton underwear and loose fitting outer clothing   Partner to avoid the same products as well  Over the counter probiotic taken orally may help to restore vaginal wiley  Vaginal Atrophy   AMBULATORY CARE:   Vaginal atrophy  is a condition that causes thinning, drying, and inflammation of vaginal tissue  This condition is caused by decreased levels of estrogen (a female sex hormone)  Vaginal atrophy can increase your risk for vaginal and urinary tract infections  Vaginal atrophy can worsen over time if not treated     Common signs and symptoms include the following:   Vaginal dryness, itching, and burning    Vaginal discharge    Pain or discomfort during sex    Light bleeding after sex    Burning during urination    Frequent, sudden, strong urges to urinate    Urinary incontinence (loss of control of your bladder)    Contact your healthcare provider if:   You have a foul-smelling odor coming from your vagina  You have a thick, cheese-like discharge from your vagina  You have itching, swelling, or redness in your vagina  You have pain or burning when you urinate  Your urine smells bad  Your symptoms do not improve, or they get worse  You have questions or concerns about your condition or care  Treatment:   Over-the counter vaginal moisturizers  can help reduce dryness  Your healthcare provider may recommend that you use a vaginal moisturizer several times each week and during sex  Only use creams that are made for vaginal use  Do  not  use petroleum jelly  Lubricants can be used during sex to decrease pain and discomfort  Estrogen  may help decrease dryness  It may also lower your risk of vaginal infections if you are going through menopause  It can also help to relieve urinary symptoms  Estrogen may be prescribed in the form of a cream, tablet, or ring  These medicines can be applied or inserted into the vagina  Estrogen can also be prescribed in the form of a pill  Follow up with your doctor as directed:  Write down your questions so you remember to ask them during your visits  © Copyright [employer] 2022 Information is for End User's use only and may not be sold, redistributed or otherwise used for commercial purposes  All illustrations and images included in CareNotes® are the copyrighted property of [employer]  or Ibrahima Segovia  The above information is an  only  It is not intended as medical advice for individual conditions or treatments   Talk to your doctor, nurse or pharmacist before following any medical regimen to see if it is safe and effective for you

## 2022-08-31 NOTE — PROGRESS NOTES
Diagnoses and all orders for this visit:    Encounter for gynecological examination without abnormal finding  -     Liquid-based pap, screening    Encounter for screening mammogram for malignant neoplasm of breast  -     Mammo screening bilateral w 3d & cad; Future    Other orders  -     folic acid (FOLVITE) 1 mg tablet; Take by mouth daily        Advised to call with any Post Menopausal bleeding  Calcium/ Vit D dietary requirements discussed,   Weight bearing exercises minium of 150 mins/weekly advised  Kegel exercises advised   Reviewed perineal hygiene and vaginal dryness post menopause  SBE and yearly mammography advised  ASCCP guidelines reviewed  Will discontinue PAP's according to recommendations  Condoms encouraged with all sexual activity to prevent STI's  Health maintenance encouraged with PMD, remain current with Colonoscopy, Dexa scan, and recommended vaccines  Advised to call with any issues, all concerns & questions addressed  See after visit summary for further information    F/U annually or Biannual if Medicare       Health Maintenance:    Last PAP: 2022 Neg/Neg  Next PAP Due: collected today     Last Mammogram:10/20/2020  Life time Catherine Vieira % 8 14, Density B scattered areas of fibroglandular density , Bi-Rads 1 Negative, had diagnostic imaging on Right breast, no concerning findings   Next Mammogram: Order given    Last Colonoscopy:2019 RTO 10y    Last DEXA: Not on file    Pleasant 64 y o  , female [id number]  postmenopausal x 5 years here for annual exam    GYN hx includes:ablation, 3 's, abnormal pap, right breast lumpectomy in 2016 benign,  No personal hx of breast, cervical, ovarian or colon CA  Family Hx: No GYN cancers  She reports no new changes in her health  Medically stable     Reports history of abnormal pap smears    Reports abnormal Mammograms needed extra imaging, cysts noted,   Denies postmenopausal bleeding or issues with vasomotor symptoms  Denies vaginal issues  Denies pelvic pain   Denies dyspareunia   Denies symptoms of pelvic organ prolapse, urinary, or fecal incontinence  Does Kegel's daily   is sexually active  Monogamous relationship  Denies STI concerns  declines STD testing   Denies intimate partner violence         Past Medical History:   Diagnosis Date    Abnormal Pap smear of cervix     all normal since    Asthma     Carbon monoxide exposure 2018    Chronic back pain     Lump of skin     last assessed 13    Migraine     Sinus bradycardia     last assessed 10/25/16    Varicella      Past Surgical History:   Procedure Laterality Date    APPENDECTOMY      BREAST CYST EXCISION Right     benign    COLONOSCOPY      TONSILLECTOMY      VARICOSE VEIN SURGERY      WISDOM TOOTH EXTRACTION Bilateral       Family History   Problem Relation Age of Onset    Heart failure Mother         CHF    Heart attack Father     Colon cancer Father 48    Heart disease Sister     No Known Problems Sister     Hypertension Brother     Stroke Brother     No Known Problems Maternal Grandmother     Lung cancer Maternal Grandfather     No Known Problems Paternal Grandmother     No Known Problems Paternal Grandfather     No Known Problems Son     No Known Problems Son     No Known Problems Son     Diabetes Maternal Aunt     Stroke Maternal Aunt     No Known Problems Paternal Aunt     No Known Problems Paternal Aunt     No Known Problems Paternal Aunt     Arthritis Family     Breast cancer Neg Hx      Social History     Tobacco Use    Smoking status: Former Smoker     Quit date:      Years since quittin 6    Smokeless tobacco: Never Used   Vaping Use    Vaping Use: Never used   Substance Use Topics    Alcohol use:  Yes     Alcohol/week: 7 0 standard drinks     Types: 7 Glasses of wine per week     Comment: social per [employer]    Drug use: No       Current Outpatient Medications:     aspirin 81 mg chewable tablet, Chew 81 mg daily, Disp: , Rfl:     Botox 200 units SOLR, , Disp: , Rfl:     colchicine (COLCRYS) 0 6 mg tablet, Take 1 tablet (0 6 mg total) by mouth daily, Disp: 90 tablet, Rfl: 3    fexofenadine (ALLEGRA) 180 MG tablet, Take 180 mg by mouth 2 (two) times a day, Disp: , Rfl:     fluticasone (FLONASE) 50 mcg/act nasal spray, 2 sprays into each nostril daily, Disp: 16 mL, Rfl: 1    folic acid (FOLVITE) 1 mg tablet, Take by mouth daily, Disp: , Rfl:     Iodine, Kelp, (KELP PO), Take 1 tablet by mouth daily  , Disp: , Rfl:     ipratropium (ATROVENT) 0 03 % nasal spray, 2 sprays into each nostril every 12 (twelve) hours, Disp: 30 mL, Rfl: 1    ketorolac (TORADOL) 10 mg tablet, 1 tab q6 hours prn migraine (with compazine if needed)   Max 2 per day and 3 per week , Disp: 15 tablet, Rfl: 2    Magnesium 200 MG TABS, Take 200 mg by mouth 2 (two) times a day, Disp: , Rfl:     meclizine (ANTIVERT) 12 5 MG tablet, 1 tab qhs prn dizziness and up to TID as needed, Disp: 30 tablet, Rfl: 0    MULTIPLE VITAMINS ESSENTIAL PO, Take by mouth, Disp: , Rfl:     prochlorperazine (COMPAZINE) 10 mg tablet, Take 1 tablet (10 mg total) by mouth every 6 (six) hours as needed for nausea or vomiting, Disp: 20 tablet, Rfl: 0    venlafaxine (EFFEXOR) 25 mg tablet, 25 MG IN THE MORNING AND 25 MG AT BEDTIME, Disp: 180 tablet, Rfl: 0    Ascorbic Acid (CEDRICK-C PO), Take by mouth (Patient not taking: Reported on 9/1/2022), Disp: , Rfl:     cetirizine (ZyrTEC) 10 mg tablet, Take 1 tablet (10 mg total) by mouth daily, Disp: 90 tablet, Rfl: 1    ibuprofen (MOTRIN) 400 mg tablet, Take 1 tablet (400 mg total) by mouth every 8 (eight) hours, Disp: 90 tablet, Rfl: 3  Patient Active Problem List    Diagnosis Date Noted    Palpitations 03/09/2022    PAC (premature atrial contraction) 03/09/2022    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 08/24/2021    Depression, recurrent ([facility] ) 08/23/2021    Encounter for gynecological examination (general) (routine) without abnormal findings 2020    Pelvic pain in female 2020    History of lumpectomy 2020    Skin rash 2020    Polyarthritis of multiple sites 2020    Phonophobia 2020    Vertiginous migraine 2019    Overweight (BMI 25 0-29 9) 2019    Anxiety and depression 2019    Varicose veins of both lower extremities with pain 2019    Chronic migraine without aura without status migrainosus, not intractable 10/08/2018    Vertigo 2018    Essential hypertension 10/10/2016    Hypercholesterolemia 10/22/2014       Allergies   Allergen Reactions    Penicillins     Doxycycline     Erythromycin     Other      Mushrooms       OB History    Para Term  AB Living   4 3 3 0 1 3   SAB IAB Ectopic Multiple Live Births   0 1 0 0 3      # Outcome Date GA Lbr Froylan/2nd Weight Sex Delivery Anes PTL Lv   4 IAB            3 Term      Vag-Spont      2 Term      Vag-Spont      1 Term      Vag-Spont          Vitals:    22 1101   BP: 128/88   BP Location: Left arm   Patient Position: Sitting   Cuff Size: Large   Weight: 82 5 kg (181 lb 12 8 oz)   Height: 5' 6 5" (1 689 m)     Body mass index is 28 9 kg/m²  Review of Systems     Constitutional: Negative for chills, fatigue, fever, headaches, visual disturbances, and unexpected weight change  Respiratory: Negative for cough, & shortness of breath  Cardiovascular: Negative for chest pain       Gastrointestinal: Negative for Abd pain, nausea & vomiting, constipation and diarrhea  Genitourinary: Negative for difficulty urinating, dysuria, hematuria, unusual vaginal bleeding or discharge  dyspareunia  Skin: Negative skin changes    Physical Exam     Constitutional: Alert & Oriented x3, well-developed and well-nourished  No distress  HENT: Atraumatic, Normocephalic, Conjunctivae clear  Neck: Normal range of motion  Neck supple   No thyromegaly, mass, nodules or tenderness  Pulmonary: Effort normal  Lungs clear to ascultation bilateral  Cardiac: RRR, no murmur   Abdominal: Soft  No tenderness or masses  Musculoskeletal: Normal ROM  Skin: Warm & Dry  Psychological: Normal mood, thought content, behavior & judgement     Breasts:   Right: tissue soft without masses, tenderness, skin changes or nipple discharge  No areas of erythema or pain  No subclavicular, axillary, pectoral adenopathy  Scar lower breast previous lumpectomy d/t trauma, benign   Left:  tissue soft without masses, tenderness, skin changes or nipple discharge  No areas of erythema or pain  No subclavicular, axillary, pectoral adenopathy    Pelvic exam was performed with patient supine, lithotomy position  Exam is consistent with  atrophic changes  Vulva/ Vestibule: Right: Negative rash, tenderness, lesion or injury                               Left: Negative rash, tenderness, lesion or injury   Urethral meatus: Negative for  tenderness, inflammation or discharge  Uterus: not deviated, enlarged, fixed or tender  Cervix: No CMT, no discharge or friability  Right adnexa: no mass, no tenderness and no fullness  Left adnexa: no mass, no tenderness and no fullness  Vagina: Consistent with  atrophic changes  No erythema, tenderness, masses, or foreign body in the vagina  No signs of injury around the vagina  No unusual vaginal discharge   Perineum without lesions, signs of injury, erythema or swelling  Inguinal Canal:        Right: No inguinal adenopathy or hernia present  Left: No inguinal adenopathy or hernia present       OBGyn Exam

## 2022-09-01 ENCOUNTER — ANNUAL EXAM (OUTPATIENT)
Dept: OBGYN CLINIC | Facility: MEDICAL CENTER | Age: 57
End: 2022-09-01
Payer: COMMERCIAL

## 2022-09-01 VITALS
BODY MASS INDEX: 28.53 KG/M2 | DIASTOLIC BLOOD PRESSURE: 88 MMHG | WEIGHT: 181.8 LBS | SYSTOLIC BLOOD PRESSURE: 128 MMHG | HEIGHT: 67 IN

## 2022-09-01 DIAGNOSIS — Z01.419 ENCOUNTER FOR GYNECOLOGICAL EXAMINATION WITHOUT ABNORMAL FINDING: Primary | ICD-10-CM

## 2022-09-01 DIAGNOSIS — Z12.31 ENCOUNTER FOR SCREENING MAMMOGRAM FOR MALIGNANT NEOPLASM OF BREAST: ICD-10-CM

## 2022-09-01 PROCEDURE — G0145 SCR C/V CYTO,THINLAYER,RESCR: HCPCS | Performed by: OBSTETRICS & GYNECOLOGY

## 2022-09-01 PROCEDURE — G0476 HPV COMBO ASSAY CA SCREEN: HCPCS | Performed by: OBSTETRICS & GYNECOLOGY

## 2022-09-01 PROCEDURE — 0503F POSTPARTUM CARE VISIT: CPT | Performed by: OBSTETRICS & GYNECOLOGY

## 2022-09-01 PROCEDURE — 99396 PREV VISIT EST AGE 40-64: CPT | Performed by: OBSTETRICS & GYNECOLOGY

## 2022-09-01 RX ORDER — FOLIC ACID 1 MG/1
TABLET ORAL DAILY
COMMUNITY

## 2022-09-01 NOTE — PROGRESS NOTES
Patient presents for a routine annual visit  Menarche- Y/O  Last Pap Smear- 03/26/2019 -/-  LMP- Ablation 2008  Birth control-  Mammogram- 10/20/2020  Colonoscopy- 05/28/2019  GP  Non smoker  Social drinker  Currently sexually active  No family history of uterine, ovarian, cervical or breast cancer     No concerns/questions for today's visit

## 2022-09-07 ENCOUNTER — TELEPHONE (OUTPATIENT)
Dept: NEUROLOGY | Facility: CLINIC | Age: 57
End: 2022-09-07

## 2022-09-07 NOTE — TELEPHONE ENCOUNTER
Prior authorization initiated via trudy/ HODAN with immediate determination:    Approved   Botox: 200 units   Auth #: [id number]  Valid: 8/8/2022 until 9/7/2023  4 visits     Please use [place] [facility] pharmacy

## 2022-09-08 LAB
LAB AP GYN PRIMARY INTERPRETATION: NORMAL
Lab: NORMAL

## 2022-09-09 ENCOUNTER — TELEPHONE (OUTPATIENT)
Dept: NEUROLOGY | Facility: CLINIC | Age: 57
End: 2022-09-09

## 2022-09-09 DIAGNOSIS — G43.709 CHRONIC MIGRAINE WITHOUT AURA WITHOUT STATUS MIGRAINOSUS, NOT INTRACTABLE: Primary | ICD-10-CM

## 2022-09-09 NOTE — TELEPHONE ENCOUNTER
Called and spoke to Christine Simon at [facility] who advised that a verbal RX is required for this patient        Transferred to pharmacist Maya Brice and provided verbal RX provided for:    Botox: 200 units  Refills: 3    Will follow up on this order

## 2022-09-14 ENCOUNTER — OFFICE VISIT (OUTPATIENT)
Dept: CARDIOLOGY CLINIC | Facility: MEDICAL CENTER | Age: 57
End: 2022-09-14
Payer: COMMERCIAL

## 2022-09-14 VITALS
OXYGEN SATURATION: 98 % | SYSTOLIC BLOOD PRESSURE: 124 MMHG | BODY MASS INDEX: 28.72 KG/M2 | WEIGHT: 183 LBS | HEART RATE: 70 BPM | DIASTOLIC BLOOD PRESSURE: 70 MMHG | HEIGHT: 67 IN

## 2022-09-14 DIAGNOSIS — I10 ESSENTIAL HYPERTENSION: Primary | ICD-10-CM

## 2022-09-14 DIAGNOSIS — E78.00 HYPERCHOLESTEROLEMIA: ICD-10-CM

## 2022-09-14 DIAGNOSIS — R00.2 PALPITATIONS: ICD-10-CM

## 2022-09-14 PROCEDURE — 99214 OFFICE O/P EST MOD 30 MIN: CPT | Performed by: INTERNAL MEDICINE

## 2022-09-14 NOTE — TELEPHONE ENCOUNTER
Spoke to [facility] at [employer] and set up delivery for:    Botox: 200 units  Qty:1  Delivery to [facility]  Scheduled: 9/16/2022      Please advise if medication does not arrive

## 2022-09-14 NOTE — PROGRESS NOTES
Cardiology   Orin Kirk 64 y o  female MRN: [id number]          Impression:  1  Hypertension - good control  2  Dyslipidemia - High LDL and HDL     3  Palpitations - likely PACs, but will want to evaluate for further dysrhythmias  4  Chest tightness - likely pericarditis  Significant improvement        Recommendations:  1  Continue colchicine  2  Follow up in 6 months  HPI: Orin Kirk is a 64y o  year old female with hypertension, dyslipidemia, premature atrial contractions, and migraines, who presents for follow up   Saw Dr Adama Skinner 6 yrs ago for palpitations - holter monitor demonstrated just premature atrial contractions  Echo demonstrated normal cardiac function with mild mitral regurgitation, and holter demonstrated no dysrhythmias  Does have improvement in symptoms  Still with rare chest pain and palpitations             Review of Systems   Constitutional: Negative  HENT: Negative  Eyes: Negative  Respiratory: Positive for chest tightness  Negative for shortness of breath  Cardiovascular: Negative for chest pain, palpitations and leg swelling  Gastrointestinal: Negative  Endocrine: Negative  Genitourinary: Negative  Musculoskeletal: Negative  Skin: Negative  Allergic/Immunologic: Negative  Neurological: Negative  Hematological: Negative  Psychiatric/Behavioral: Negative  All other systems reviewed and are negative          Past Medical History:   Diagnosis Date    Abnormal Pap smear of cervix 1989    all normal since    Asthma     Carbon monoxide exposure 2/6/2018    Chronic back pain     Lump of skin     last assessed 11/21/13    Migraine     Sinus bradycardia     last assessed 10/25/16    Varicella      Past Surgical History:   Procedure Laterality Date    APPENDECTOMY      BREAST CYST EXCISION Right 2001    benign    COLONOSCOPY      TONSILLECTOMY      VARICOSE VEIN SURGERY      WISDOM TOOTH EXTRACTION Bilateral Social History     Substance and Sexual Activity   Alcohol Use Yes    Alcohol/week: 7 0 standard drinks    Types: 7 Glasses of wine per week    Comment: social per [employer]     Social History     Substance and Sexual Activity   Drug Use No     Social History     Tobacco Use   Smoking Status Former Smoker    Quit date:     Years since quittin 7   Smokeless Tobacco Never Used     Family History   Problem Relation Age of Onset    Heart failure Mother         CHF    Heart attack Father     Colon cancer Father 48    Heart disease Sister     No Known Problems Sister     Hypertension Brother     Stroke Brother     No Known Problems Maternal Grandmother     Lung cancer Maternal Grandfather     No Known Problems Paternal Grandmother     No Known Problems Paternal Grandfather     No Known Problems Son     No Known Problems Son     No Known Problems Son     Diabetes Maternal Aunt     Stroke Maternal Aunt     No Known Problems Paternal Aunt     No Known Problems Paternal Aunt     No Known Problems Paternal Aunt     Arthritis Family     Breast cancer Neg Hx        Allergies: Allergies   Allergen Reactions    Penicillins     Doxycycline     Erythromycin     Other      Mushrooms       Medications:     Current Outpatient Medications:     aspirin 81 mg chewable tablet, Chew 81 mg daily, Disp: , Rfl:     Botox 200 units SOLR, , Disp: , Rfl:     colchicine (COLCRYS) 0 6 mg tablet, Take 1 tablet (0 6 mg total) by mouth daily, Disp: 90 tablet, Rfl: 3    fexofenadine (ALLEGRA) 180 MG tablet, Take 180 mg by mouth 2 (two) times a day, Disp: , Rfl:     fluticasone (FLONASE) 50 mcg/act nasal spray, 2 sprays into each nostril daily, Disp: 16 mL, Rfl: 1    folic acid (FOLVITE) 1 mg tablet, Take by mouth daily, Disp: , Rfl:     Iodine, Kelp, (KELP PO), Take 1 tablet by mouth daily  , Disp: , Rfl:     ketorolac (TORADOL) 10 mg tablet, 1 tab q6 hours prn migraine (with compazine if needed)   Max 2 per day and 3 per week , Disp: 15 tablet, Rfl: 2    Magnesium 200 MG TABS, Take 200 mg by mouth 2 (two) times a day, Disp: , Rfl:     meclizine (ANTIVERT) 12 5 MG tablet, 1 tab qhs prn dizziness and up to TID as needed, Disp: 30 tablet, Rfl: 0    MULTIPLE VITAMINS ESSENTIAL PO, Take by mouth, Disp: , Rfl:     prochlorperazine (COMPAZINE) 10 mg tablet, Take 1 tablet (10 mg total) by mouth every 6 (six) hours as needed for nausea or vomiting, Disp: 20 tablet, Rfl: 0    venlafaxine (EFFEXOR) 25 mg tablet, 25 MG IN THE MORNING AND 25 MG AT BEDTIME, Disp: 180 tablet, Rfl: 0    Ascorbic Acid (CEDRICK-C PO), Take by mouth (Patient not taking: No sig reported), Disp: , Rfl:     cetirizine (ZyrTEC) 10 mg tablet, Take 1 tablet (10 mg total) by mouth daily (Patient not taking: Reported on 9/14/2022), Disp: 90 tablet, Rfl: 1    ibuprofen (MOTRIN) 400 mg tablet, Take 1 tablet (400 mg total) by mouth every 8 (eight) hours (Patient not taking: Reported on 9/14/2022), Disp: 90 tablet, Rfl: 3    ipratropium (ATROVENT) 0 03 % nasal spray, 2 sprays into each nostril every 12 (twelve) hours (Patient not taking: Reported on 9/14/2022), Disp: 30 mL, Rfl: 1      Wt Readings from Last 3 Encounters:   09/14/22 83 kg (183 lb)   09/01/22 82 5 kg (181 lb 12 8 oz)   07/07/22 83 5 kg (184 lb)     Temp Readings from Last 3 Encounters:   06/21/22 (!) 97 °F (36 1 °C) (Tympanic)   05/19/22 98 °F (36 7 °C)   03/15/22 (!) 97 1 °F (36 2 °C) (Tympanic)     BP Readings from Last 3 Encounters:   09/14/22 124/70   09/01/22 128/88   07/07/22 154/80     Pulse Readings from Last 3 Encounters:   09/14/22 70   07/07/22 58   06/21/22 58         Physical Exam  HENT:      Head: Atraumatic  Mouth/Throat:      Mouth: Mucous membranes are moist    Eyes:      Extraocular Movements: Extraocular movements intact  Cardiovascular:      Rate and Rhythm: Normal rate and regular rhythm  Heart sounds: Normal heart sounds     Pulmonary:      Effort: Pulmonary effort is normal       Breath sounds: Normal breath sounds  Abdominal:      General: Abdomen is flat  Musculoskeletal:         General: Normal range of motion  Cervical back: Normal range of motion  Skin:     General: Skin is warm  Neurological:      General: No focal deficit present  Mental Status: She is alert and oriented to person, place, and time     Psychiatric:         Mood and Affect: Mood normal          Behavior: Behavior normal            Laboratory Studies:  CMP:  Lab Results   Component Value Date     12/18/2015    K 4 6 02/12/2022     02/12/2022    CO2 30 02/12/2022    ANIONGAP 10 12/18/2015    BUN 14 02/12/2022    CREATININE 0 83 02/12/2022    GLUCOSE 92 12/18/2015    AST 19 03/11/2021    ALT 31 03/11/2021    BILITOT 0 64 12/18/2015    EGFR 79 02/12/2022       Lipid Profile:   No results found for: CHOL  Lab Results   Component Value Date    HDL 85 02/12/2022     Lab Results   Component Value Date    LDLCALC 154 (H) 02/12/2022     Lab Results   Component Value Date    TRIG 89 02/12/2022

## 2022-09-16 NOTE — TELEPHONE ENCOUNTER
Botox number of units: 200  Botox quantity: 1  Arrived at what location: [facility]  Botox at Correct Administering Location: YES  NDC number:7428-3432-81  Lot number: [id number]  Expiration Date: 02/28/2025  Appt notes indicate correct medication: YES  Botox logged into Botox log book and stored in Botox refrigerator

## 2022-09-18 RX ORDER — ONABOTULINUMTOXINA 200 [USP'U]/1
INJECTION, POWDER, LYOPHILIZED, FOR SOLUTION INTRADERMAL; INTRAMUSCULAR
Qty: 1 EACH | Refills: 0 | Status: SHIPPED | OUTPATIENT
Start: 2022-09-18

## 2022-09-27 ENCOUNTER — PROCEDURE VISIT (OUTPATIENT)
Dept: NEUROLOGY | Facility: CLINIC | Age: 57
End: 2022-09-27
Payer: COMMERCIAL

## 2022-09-27 VITALS — SYSTOLIC BLOOD PRESSURE: 172 MMHG | DIASTOLIC BLOOD PRESSURE: 83 MMHG | HEART RATE: 56 BPM | TEMPERATURE: 97.5 F

## 2022-09-27 DIAGNOSIS — G43.709 CHRONIC MIGRAINE WITHOUT AURA WITHOUT STATUS MIGRAINOSUS, NOT INTRACTABLE: Primary | ICD-10-CM

## 2022-09-27 PROCEDURE — 64615 CHEMODENERV MUSC MIGRAINE: CPT | Performed by: PHYSICIAN ASSISTANT

## 2022-09-27 RX ORDER — VENLAFAXINE 25 MG/1
TABLET ORAL
Qty: 180 TABLET | Refills: 4 | Status: SHIPPED | OUTPATIENT
Start: 2022-09-27

## 2022-09-27 RX ORDER — PROCHLORPERAZINE MALEATE 10 MG
10 TABLET ORAL EVERY 8 HOURS PRN
Qty: 30 TABLET | Refills: 3 | Status: SHIPPED | OUTPATIENT
Start: 2022-09-27

## 2022-09-27 RX ORDER — KETOROLAC TROMETHAMINE 10 MG/1
TABLET, FILM COATED ORAL
Qty: 15 TABLET | Refills: 2 | Status: SHIPPED | OUTPATIENT
Start: 2022-09-27

## 2022-09-27 NOTE — PROGRESS NOTES
Universal Protocol   Consent: Verbal consent obtained  Written consent obtained    Risks and benefits: risks, benefits and alternatives were discussed  Consent given by: patient  Patient understanding: patient states understanding of the procedure being performed  Patient consent: the patient's understanding of the procedure matches consent given  Procedure consent: procedure consent matches procedure scheduled        Chemodenervation     Date/Time 9/27/2022 8:26 AM     Performed by  Edgar Kee PA-C     Authorized by Edgar Kee PA-C        Pre-procedure details      Prepped With: Alcohol     Procedure details     Position:  Upright   Botox     Botox Type:  Type A    Brand:  Botox    mL's of Botulinum Toxin:  200    Final Concentration per CC:  100 units    Needle Gauge:  30 G 2 5 inch   Procedures     Botox Procedures: chronic headache      Indications: migraines     Injection Location      Head / Face:  L superior trapezius, R superior trapezius, L superior cervical paraspinal, R superior cervical paraspinal, L , R , procerus, L temporalis, R temporalis, R frontalis, L frontalis, R medial occipitalis and L medial occipitalis    L  injection amount:  5 unit(s)    R  injection amount:  5 unit(s)    L lateral frontalis:  5 unit(s)    R lateral frontalis:  5 unit(s)    L medial frontalis:  5 unit(s)    R medial frontalis:  5 unit(s)    L temporalis injection amount:  20 unit(s)    R temporalis injection amount:  20 unit(s)    Procerus injection amount:  5 unit(s)    L medial occipitalis injection amount:  15 unit(s)    R medial occipitalis injection amount:  15 unit(s)    L superior cervical paraspinal injection amount:  10 unit(s)    R superior cervical paraspinal injection amount:  10 unit(s)    L superior trapezius injection amount:  15 unit(s)    R superior trapezius injection amount:  15 unit(s)   Total Units     Total units used:  200    Total units discarded:  0 Post-procedure details      Chemodenervation:  Chronic migraine    Facial Nerve Location[de-identified]  Bilateral facial nerve    Patient tolerance of procedure: Tolerated well, no immediate complications   Comments      Extra units medically necessary:  - 20 between both temporalis muscles  - 15 scalp/ apex  - 5 R upper traps, 5 L upper traps       Blood pressure (!) 172/83, pulse 56, temperature 97 5 °F (36 4 °C), temperature source Temporal, not currently breastfeeding  Pt instructed to use compazine q8 hours prn nausea, with more nausea 2 weeks leading up to next botox

## 2022-11-30 ENCOUNTER — TELEPHONE (OUTPATIENT)
Dept: NEUROLOGY | Facility: CLINIC | Age: 57
End: 2022-11-30

## 2022-12-01 NOTE — TELEPHONE ENCOUNTER
[address], spoke to [facility] she advised that she will get profile to fill and will request stat insurance review for delivery as soon as possible  Will continue to follow up with Sybil rogers

## 2022-12-01 NOTE — TELEPHONE ENCOUNTER
[facility], spoke to [address] [place] [address], she advised that the Botox is ready for delivery 12/2/2022  Botox 200 UNITS  Qty  1  Scheduled:12/2/2022  Location:[place]  Via: [facility]    Please advise if Botox does not arrive  Thank you

## 2022-12-02 NOTE — TELEPHONE ENCOUNTER
Botox number of units: 1  Botox quantity: 2  Arrived at what location: [facility]  Botox at Correct Administering Location:Yes  NDC number:6373-6078-18  Lot number: [id number]  Expiration Date: 05/2025  Appt notes indicate correct medication: Yes    Botox received Logged and stored in fridge

## 2022-12-22 ENCOUNTER — TELEPHONE (OUTPATIENT)
Dept: NEUROLOGY | Facility: CLINIC | Age: 57
End: 2022-12-22

## 2022-12-26 NOTE — PROGRESS NOTES
Universal Protocol   Consent: Verbal consent obtained  Written consent obtained    Risks and benefits: risks, benefits and alternatives were discussed  Consent given by: patient  Patient understanding: patient states understanding of the procedure being performed  Patient consent: the patient's understanding of the procedure matches consent given  Procedure consent: procedure consent matches procedure scheduled        Chemodenervation     Date/Time 12/26/2022 1:59 PM     Performed by  Travis Neves PA-C     Authorized by Travis Neves PA-C        Pre-procedure details      Prepped With: Alcohol     Procedure details     Position:  Upright   Botox     Botox Type:  Type A    Brand:  Botox    mL's of Botulinum Toxin:  200    Final Concentration per CC:  100 units    Needle Gauge:  30 G 2 5 inch   Procedures     Botox Procedures: chronic headache      Indications: migraines     Injection Location      Head / Face:  L superior trapezius, R superior trapezius, L superior cervical paraspinal, R superior cervical paraspinal, L , R , procerus, L temporalis, R temporalis, R frontalis, L frontalis, R medial occipitalis and L medial occipitalis    L  injection amount:  5 unit(s)    R  injection amount:  5 unit(s)    L lateral frontalis:  5 unit(s)    R lateral frontalis:  5 unit(s)    L medial frontalis:  5 unit(s)    R medial frontalis:  5 unit(s)    L temporalis injection amount:  20 unit(s)    R temporalis injection amount:  20 unit(s)    Procerus injection amount:  5 unit(s)    L medial occipitalis injection amount:  15 unit(s)    R medial occipitalis injection amount:  15 unit(s)    L superior cervical paraspinal injection amount:  10 unit(s)    R superior cervical paraspinal injection amount:  10 unit(s)    L superior trapezius injection amount:  15 unit(s)    R superior trapezius injection amount:  15 unit(s)   Total Units     Total units used:  200    Total units discarded: 0   Post-procedure details      Chemodenervation:  Chronic migraine    Facial Nerve Location[de-identified]  Bilateral facial nerve    Patient tolerance of procedure: Tolerated well, no immediate complications   Comments      Extra units medically necessary:  - 20 between both temporalis muscles  - 15 scalp/ apex  - 10 between both upper traps       Blood pressure 158/74, pulse (!) 54, temperature (!) 97 3 °F (36 3 °C), temperature source Temporal, height 5' 6 5" (1 689 m), weight 83 9 kg (185 lb), not currently breastfeeding  Pt concerned about weight gain  She is now on testosterone, estrogen and progesterone creams, per Dr Taylor Rosales  Asking for other recommendations if possible  Referred to weight management group  Also recommended endocrinology re: h/o elevated TSH: 5 030 7/7/2020, 2 150 8/29/2020, 2 740 2/12/22  Migraines stable and reduced overall with regular botox injections  1  Chronic migraine without aura without status migrainosus, not intractable  - Chemodenervation  - Botulinum Toxin Type A SOLR 200 Units    2  BMI 29 0-29 9,adult  - Ambulatory Referral to Weight Management; Future    3  Other specified hypothyroidism  - Ambulatory Referral to Endocrinology;  Future

## 2022-12-27 ENCOUNTER — PROCEDURE VISIT (OUTPATIENT)
Dept: NEUROLOGY | Facility: CLINIC | Age: 57
End: 2022-12-27

## 2022-12-27 VITALS
HEIGHT: 67 IN | BODY MASS INDEX: 29.03 KG/M2 | WEIGHT: 185 LBS | TEMPERATURE: 97.3 F | DIASTOLIC BLOOD PRESSURE: 74 MMHG | HEART RATE: 54 BPM | SYSTOLIC BLOOD PRESSURE: 158 MMHG

## 2022-12-27 DIAGNOSIS — G43.709 CHRONIC MIGRAINE WITHOUT AURA WITHOUT STATUS MIGRAINOSUS, NOT INTRACTABLE: Primary | ICD-10-CM

## 2022-12-27 DIAGNOSIS — E03.8 OTHER SPECIFIED HYPOTHYROIDISM: ICD-10-CM

## 2023-01-09 ENCOUNTER — CONSULT (OUTPATIENT)
Dept: ENDOCRINOLOGY | Facility: HOSPITAL | Age: 58
End: 2023-01-09

## 2023-01-09 VITALS
WEIGHT: 187 LBS | DIASTOLIC BLOOD PRESSURE: 80 MMHG | HEART RATE: 68 BPM | SYSTOLIC BLOOD PRESSURE: 130 MMHG | HEIGHT: 67 IN | BODY MASS INDEX: 29.35 KG/M2

## 2023-01-09 DIAGNOSIS — Z78.0 POST-MENOPAUSAL: Primary | ICD-10-CM

## 2023-01-09 DIAGNOSIS — E03.8 OTHER SPECIFIED HYPOTHYROIDISM: ICD-10-CM

## 2023-01-09 RX ORDER — PAROXETINE HYDROCHLORIDE 20 MG/1
20 TABLET, FILM COATED ORAL DAILY
Qty: 90 TABLET | Refills: 1 | Status: SHIPPED | OUTPATIENT
Start: 2023-01-09

## 2023-01-09 NOTE — PROGRESS NOTES
1/9/2023    Assessment/Plan        Problem List Items Addressed This Visit    None  Visit Diagnoses     Post-menopausal    -  Primary    Relevant Medications    PARoxetine (PAXIL) 20 mg tablet    Other specified hypothyroidism              Assessment/Plan:  57yF with PMhx of migraine headaches, htn, hyperlipidemia who was seen today for generalized symptoms of fatigue, mood issues, hair loss, weight gain, low libido and amenorrhea since ablation in 2008  Patient has TSH checked and was normal  Consolidation of symptoms most certainly indicate post menopausal symptoms, patients LH is also somewhat high with estradiol <15 indicating post menopause however would ideally want to check [address] to confirm post menopause level >25, as patient with endometrial ablation menstral cessation prior to being menopausal  Given patient currently on estrogen, progestrone and testosterone cannot check FSH to prove post menopause currently  Recommend d/c these for >3 months to then check [address] possible to confirm status  Also discussed Tx for post menopausal symptoms  If with severe vasomotor symptoms could have tried estrogen/progestrone replacement- patients does NOT have these symptoms currently and resolved previously with estrotone natural supplement  Therefore recommend stopping her Estrogen/progestrone replacement for now and trying to eat more estrogen reach foods such as soya and also ok with taking black cohosh since these are high in estrogen  Symptoms of low libido possible d/t hypoactive sexual syndrome which is not a diagnosis studied in our literature but does have some utility with topical T use, however since patient has not found any benefit of using the topical testosterone currently with her libido and is having side effects of rage would not recommend further use of topical testosterone either       Discussed unfortunately all her symptoms indicate post menopausal symptoms and Tx is symptomatic management rather than hormonal balancing unless having severe vasomotor issues which she isnt having  Given current symptoms of tiredness and mood issues recommend using paroxetine for now  Given this d/c venlaflaxine to avoid SS  Discussed talk to neurological for other Tx for her migraine headaches  Did discuss side effect of worsening libido with SSRI use  RTC in 6 months to see symptomatic improvement, if return of vasomotor symptoms can discuss COCP until age 61  And recheck [facility] if off hormonal therapy for >3 months     CC: Post menopause    History of Present Illness     HPI: Vero Epstein is a 62y o  year old female with history of migraine headaches, hypertension who was seen today for hormonal therapy for possible post menopausal syndrome  Patient indicates she has endometrial ablation in 2008 and since the last 7-8 yrs has been experiencing symptoms of hot flashes, night sweats, low libido, weight gain, hair loss and mood issues  She started taking Estrotone:- chasta tree blend with black cohosh and this helped with hot flashes and night sweats  She continued to have issues with low libido, weight gain and feeling very down and not like herself  Saw Dr Sanjeev Kramer at [facility] and was started on  Progestrone 1gm, testosterone 0 5mg and  E2/E3 0 125:0 5mg/0 15g topical creams to be taken daily  She has been taking these for a while now and still feels like her libido is poor, tired and doesn't feel like herself  She has started having issues with rage and being champion since starting testosterone and also having breast tenderness at times  She isnt sure if all her symptoms are post menopausal or not  Denies any history of smoking, or breast cancer or blood clot issues  Denies any blood clot issues in family  Patient also reports being very active and works as a mail  and eats healthy- doesn't calorie count despite this she is gaining weight       Social hx:- denies smoking, drinks alcohol occasionally and no other recreational drugs  PMhx:- as noted above     Review of Systems   Constitutional: Positive for diaphoresis, fatigue and unexpected weight change  HENT: Negative for trouble swallowing and voice change  Eyes: Negative for photophobia and visual disturbance  Respiratory: Negative for choking and shortness of breath  Cardiovascular: Negative for chest pain  Gastrointestinal: Negative for constipation and diarrhea  Endocrine: Positive for heat intolerance  Negative for cold intolerance  Musculoskeletal: Positive for myalgias  Negative for arthralgias       Historical Information   Past Medical History:   Diagnosis Date   • Abnormal Pap smear of cervix     all normal since   • Asthma    • Carbon monoxide exposure 2018   • Chronic back pain    • Lump of skin     last assessed 13   • Migraine    • Sinus bradycardia     last assessed 10/25/16   • Varicella      Past Surgical History:   Procedure Laterality Date   • APPENDECTOMY     • BREAST CYST EXCISION Right     benign   • COLONOSCOPY     • TONSILLECTOMY     • VARICOSE VEIN SURGERY     • WISDOM TOOTH EXTRACTION Bilateral      Social History   Social History     Substance and Sexual Activity   Alcohol Use Yes   • Alcohol/week: 7 0 standard drinks   • Types: 7 Glasses of wine per week    Comment: social per [employer]     Social History     Substance and Sexual Activity   Drug Use No     Social History     Tobacco Use   Smoking Status Former   • Types: Cigarettes   • Quit date:    • Years since quittin 0   Smokeless Tobacco Never     Family History:   Family History   Problem Relation Age of Onset   • Heart failure Mother         CHF   • Heart attack Father    • Colon cancer Father 48   • Heart disease Sister    • No Known Problems Sister    • Hypertension Brother    • Stroke Brother    • No Known Problems Maternal Grandmother    • Lung cancer Maternal Grandfather    • No Known Problems Paternal Grandmother    • No Known Problems Paternal Grandfather    • No Known Problems Son    • No Known Problems Son    • No Known Problems Son    • Diabetes Maternal Aunt    • Stroke Maternal Aunt    • No Known Problems Paternal Aunt    • No Known Problems Paternal Aunt    • No Known Problems Paternal Aunt    • Arthritis Family    • Breast cancer Neg Hx        Meds/Allergies   Current Outpatient Medications   Medication Sig Dispense Refill   • aspirin 81 mg chewable tablet Chew 81 mg daily     • Botox 200 units SOLR INJECT 200 UNITS IN THE MUSCLE OF VARIOUS SITES OF THE HEAD AND NECK ONCE EVERY 3 MONTHS 1 each 0   • colchicine (COLCRYS) 0 6 mg tablet Take 1 tablet (0 6 mg total) by mouth daily 90 tablet 3   • fexofenadine (ALLEGRA) 180 MG tablet Take 180 mg by mouth 2 (two) times a day     • fluticasone (FLONASE) 50 mcg/act nasal spray 2 sprays into each nostril daily 16 mL 1   • folic acid (FOLVITE) 1 mg tablet Take by mouth daily     • Iodine, Kelp, (KELP PO) Take 1 tablet by mouth daily       • ketorolac (TORADOL) 10 mg tablet 1 tab q6 hours prn migraine (with compazine if needed)  Max 2 per day and 3 per week  15 tablet 2   • Magnesium 200 MG TABS Take 200 mg by mouth 2 (two) times a day     • meclizine (ANTIVERT) 12 5 MG tablet 1 tab qhs prn dizziness and up to TID as needed 30 tablet 0   • MULTIPLE VITAMINS ESSENTIAL PO Take by mouth     • prochlorperazine (COMPAZINE) 10 mg tablet Take 1 tablet (10 mg total) by mouth every 8 (eight) hours as needed for nausea or vomiting 30 tablet 3   • venlafaxine (EFFEXOR) 25 mg tablet 25 MG IN THE MORNING AND 25 MG AT BEDTIME 180 tablet 4     No current facility-administered medications for this visit  Allergies   Allergen Reactions   • Penicillins    • Doxycycline    • Erythromycin    • Other      Mushrooms       Objective   Vitals: Blood pressure 130/80, pulse 68, height 5' 6 5" (1 689 m), weight 84 8 kg (187 lb), not currently breastfeeding    Invasive Devices     None Physical Exam  Constitutional:       Appearance: Normal appearance  She is obese  Cardiovascular:      Rate and Rhythm: Normal rate and regular rhythm  Pulses: Normal pulses  Pulmonary:      Effort: Pulmonary effort is normal    Musculoskeletal:      Cervical back: Normal range of motion and neck supple  Skin:     General: Skin is warm and dry  Capillary Refill: Capillary refill takes less than 2 seconds  Neurological:      General: No focal deficit present  Mental Status: She is alert and oriented to person, place, and time  Psychiatric:         Mood and Affect: Mood normal          The history was obtained from the review of the chart and from the patient and family      Lab Results:      Labs scanned in chart     01/23  TSH 2 13, Free T4 0 9, FreeT3 3 2   vitB12 739  DHEA-s 33  LH 26 4   Progestrone 0 7   Estradiol <15   VitD 65  Pending Testosterone levels and TPO Ab    Lab Results   Component Value Date    CREATININE 0 83 02/12/2022    CREATININE 0 98 03/11/2021    CREATININE 0 95 08/29/2020    BUN 14 02/12/2022     12/18/2015    K 4 6 02/12/2022     02/12/2022    CO2 30 02/12/2022     eGFR   Date Value Ref Range Status   02/12/2022 79 ml/min/1 73sq m Final     No components found for: [facility]    Lab Results   Component Value Date    HDL 85 02/12/2022    TRIG 89 02/12/2022       Lab Results   Component Value Date    ALT 31 03/11/2021    AST 19 03/11/2021    ALKPHOS 87 03/11/2021    BILITOT 0 64 12/18/2015       Lab Results   Component Value Date    FREET4 0 81 07/07/2020       Future Appointments   Date Time Provider Estiven Urrutia   2/13/2023 11:45 AM Carter Cunningham PA-C [facility]   3/28/2023  9:30 AM Sawyer Krueger PA-C [facility]   9/7/2023  3:00 PM VENKATA Malave [facility]

## 2023-01-18 ENCOUNTER — TELEPHONE (OUTPATIENT)
Dept: NEUROLOGY | Facility: CLINIC | Age: 58
End: 2023-01-18

## 2023-01-18 NOTE — TELEPHONE ENCOUNTER
Patient of [facility], last visit botox 12/27    Patient is reporting feeling lightheaded with fatigue, weakness and nausea "very blah" and off balance since 1/10 when she started paxil and stopped effexor as recommended by endocrinologist   She said she "felt weird spasm on her head" and vertigo started with room spinning, lasting a few minutes; said since she's had vertigo in the past, she knew to "tilt her head back" and vertigo resolved  Also had headache and took ketorolac and compazine x 2 yesterday as well as meclizine tid  She said she informed endocrinologist who made the above medication change and was told to stop the paxil, which she did on 1/12  Reporting feeling better today however was dizzy all day yesterday;  also said she was recently told she has hashimotos disease and is waiting to start medication  Please provide recommendation, thank you

## 2023-01-18 NOTE — TELEPHONE ENCOUNTER
----- Message from Farzad Estes sent at 1/17/2023  7:40 AM EST -----  Regarding: FW: Vertigo  Contact: [phone]    ----- Message -----  From: Milan Sr  Sent: 1/17/2023   7:02 AM EST  To: [facility]  Subject: Vertigo                                          I've been having a lot of dizziness since our last visit  Thought it was from endocrinology placing me on Paxil and dropping effexor but I haven't been on either one since 1/12/2023 and I'm still having issues with lightheadedness, off balance, upset stomach, weakness and dizziness (vertigo) I'm really scared something else is wrong  I was only on 25mg of effexor daily    Frank Garrison  Just ONE pill instead of two jim of weening off but I had problems weening off the last pill even with slowly cutting it back but not like I'm getting now and I had started these issues before being off mood elevators

## 2023-01-19 NOTE — TELEPHONE ENCOUNTER
Those symptoms can happen when you stop venlafaxine suddenly  I would recommend slowly titrating the medication again starting with 37 5  Paxil may be helpful for her but she needs to wean the venlafaxine and not stop suddenly  I do not think it was Paxil that causes side effects I think it was the sudden stop of venlafaxine

## 2023-01-20 ENCOUNTER — TELEPHONE (OUTPATIENT)
Dept: BARIATRICS | Facility: CLINIC | Age: 58
End: 2023-01-20

## 2023-03-06 ENCOUNTER — TELEPHONE (OUTPATIENT)
Dept: NEUROLOGY | Facility: CLINIC | Age: 58
End: 2023-03-06

## 2023-03-07 ENCOUNTER — TELEMEDICINE (OUTPATIENT)
Dept: OBGYN CLINIC | Facility: CLINIC | Age: 58
End: 2023-03-07

## 2023-03-07 DIAGNOSIS — E06.3 HASHIMOTO'S THYROIDITIS: ICD-10-CM

## 2023-03-07 DIAGNOSIS — R53.82 CHRONIC FATIGUE: ICD-10-CM

## 2023-03-07 DIAGNOSIS — N95.1 MENOPAUSAL SYMPTOMS: Primary | ICD-10-CM

## 2023-03-07 DIAGNOSIS — R63.5 WEIGHT GAIN: ICD-10-CM

## 2023-03-07 NOTE — TELEPHONE ENCOUNTER
Botox number of units: 200  Botox quantity: 1  Arrived at what location: [facility]  Botox at Correct Administering Location: Yes  NDC number: 8349-8633-84  Lot number: [id number]  Expiration Date: 09/31/2025  Appt notes indicate correct medication: Yes  Botox logged into the botox log book and stored in Botox fridge

## 2023-03-08 PROBLEM — Z01.419 ENCOUNTER FOR GYNECOLOGICAL EXAMINATION (GENERAL) (ROUTINE) WITHOUT ABNORMAL FINDINGS: Status: RESOLVED | Noted: 2020-07-06 | Resolved: 2023-03-08

## 2023-03-08 NOTE — PROGRESS NOTES
Virtual Regular Visit    Verification of patient location:    Patient is located in the following state in which I hold an active license [place]      Assessment/Plan:    Problem List Items Addressed This Visit    None  Visit Diagnoses     Menopausal symptoms    -  Primary    Hashimoto's thyroiditis        Relevant Orders    US thyroid    Weight gain        Chronic fatigue            1) She is already well versed in the short term benefits of HRT for symptom relief  I discussed the long term health benefits of E2/PG, including: reduction of CVD risk, reduction of hyperlipidemia, reduction of DM and GLP-1 agonist activity with mild appetite suppression; reduction of colon CA, osteoporosis and cognitive decline, Alzheimer's disease  2) I suspect she has thyromegaly due to Hashimotos thyroiditis, US of neck ordered to exclude other nodules or pathology  3) Her thyroid function is actually stable, controversial whether thyroid replacement will help quiet antibody response  We discussed the premise of the Paleo food plan, avoiding gluten and casein proteins which are prone to thyroid antibody formation by aggravating leaky gut  Paleo food plan resources outlined, it does not need to be that hard or restrictive  "It Starts with Food" is a great book to read  She hopefully feels better about this  4) Her hormone regimen is somewhat complicated, very unique dosing regimen  We discussed that BHRT does not necessarily imply compounded formulations  When finished with current scripts, will change Biest cream to estradiol patch or oral, as well as compounded PG to micronized PG orally through [facility]  DHEA can be obtained OTC, I would eliminate pregnenolone eventually  5) Recommend repeat labs in 3 months or so after medication changes  I voiced my preference for not drawing labs as often as every 3 months  Once stable, 1-2 times yearly should suffice  6) she does not need testosterone replacement at this time   She is concerned about cortisol, as she discussed this with previous provider  Offered 24 hour salivary cortisol testing through [facility], which is  Not a bad idea given weight gain issues  Once completed, will email her with results, unless abnormal in which in person OV will be scheduled  This was a 60 minute visit with greater than 50% of time spent in face to face counseling and coordination of care       Reason for visit is   Chief Complaint   Patient presents with   • Virtual Regular Visit        Encounter provider Jennifer Santos MD    Provider located at OB/GYN [facility] [address] [address]  [phone]      Recent Visits  No visits were found meeting these conditions  Showing recent visits within past 7 days and meeting all other requirements  Future Appointments  No visits were found meeting these conditions  Showing future appointments within next 150 days and meeting all other requirements       The patient was identified by name and date of birth  Faustina Mclean was informed that this is a telemedicine visit and that the visit is being conducted through the [facility]  She agrees to proceed     My office door was closed  No one else was in the room  She acknowledged consent and understanding of privacy and security of the video platform  The patient has agreed to participate and understands they can discontinue the visit at any time  Patient is aware this is a billable service  Amalia Gallardo presents for hormonal consult, her first visit with me; self referred, sees Sharda Cristobal for gyn care  Nataly Jimenez has been menopausal for over 5 years  Complains of:  1) Weight gain, progressive, despite what she thinks is good nutrition and exercise  2) Fatigue  3) Brittle hair and nails  4) Most recently with difficulty swallowing, feels like her thyroid is enlarged    5) [place], agitated  She responded to an online ad for hormonal evaluation and testing through [facility], an advanced practitioner in [place]  Extensive lab testing revealed elevated TPO antibodies consistent with Hashimotos thyroiditis, although TSH/FT4/FT3 were all normal; menopausal ovarian axis confirmed; adrenal axis : low DHEAS 33, total testosterone low normal  21; CBC, CMP, Vit B12 all normal    She was started on a regimen of E2/PG/Test cream in 10/22  She still did not feel better, so regimen changed to: :  A) Pregnenolone 25 mg/DHEA 15 mg daily  2) T4/T3 38/9 mcg respectively  3) Compounded PG  mg daily  4) Biest 0 25mg/1mg daily (this does not sound right)  5) She was told to do a Paleo diet, but is confused about this and not sure what she is doing  Or why  She is looking for someone more local and cost effective to resume and continue hormonal care  PMHX: as above; hyperlipidemia  FHX: Mom COD CHF age 59; MGM with CVD, lipid  Dad with ETOH, COD AMI age 67  PFP unknown  5 siblings, with anxiety and mental health issues  3 kids, ages 34/30/34, one son with bipolar         Past Medical History:   Diagnosis Date   • Abnormal Pap smear of cervix 1989    all normal since   • Asthma    • Carbon monoxide exposure 2/6/2018   • Chronic back pain    • Lump of skin     last assessed 11/21/13   • Migraine    • Sinus bradycardia     last assessed 10/25/16   • Varicella        Past Surgical History:   Procedure Laterality Date   • APPENDECTOMY     • BREAST CYST EXCISION Right 2001    benign   • COLONOSCOPY     • TONSILLECTOMY     • VARICOSE VEIN SURGERY     • WISDOM TOOTH EXTRACTION Bilateral        Current Outpatient Medications   Medication Sig Dispense Refill   • aspirin 81 mg chewable tablet Chew 81 mg daily     • Botox 200 units SOLR INJECT 200 UNITS IN THE MUSCLE OF VARIOUS SITES OF THE HEAD AND NECK ONCE EVERY 3 MONTHS 1 each 0   • fexofenadine (ALLEGRA) 180 MG tablet Take 180 mg by mouth 2 (two) times a day     • fluticasone (FLONASE) 50 mcg/act nasal spray 2 sprays into each nostril daily 16 mL 1   • folic acid (FOLVITE) 1 mg tablet Take by mouth daily     • ketorolac (TORADOL) 10 mg tablet 1 tab q6 hours prn migraine (with compazine if needed)  Max 2 per day and 3 per week  15 tablet 2   • Magnesium 200 MG TABS Take 200 mg by mouth 2 (two) times a day     • meclizine (ANTIVERT) 12 5 MG tablet 1 tab qhs prn dizziness and up to TID as needed 30 tablet 0   • MULTIPLE VITAMINS ESSENTIAL PO Take by mouth     • prochlorperazine (COMPAZINE) 10 mg tablet Take 1 tablet (10 mg total) by mouth every 8 (eight) hours as needed for nausea or vomiting 30 tablet 3     No current facility-administered medications for this visit  Allergies   Allergen Reactions   • Penicillins    • Doxycycline    • Erythromycin    • Other      Mushrooms       Review of Systems   Constitutional: Positive for fatigue and unexpected weight change  Negative for activity change and appetite change  HENT: Positive for trouble swallowing  Respiratory: Positive for choking  Cardiovascular: Negative  Gastrointestinal: Negative  Endocrine: Negative  Genitourinary: Negative  Musculoskeletal: Negative  Negative for arthralgias  Skin:        Brittle hair and nails, dry skin, see HPI   Neurological: Negative  Psychiatric/Behavioral: Positive for agitation and dysphoric mood  Negative for sleep disturbance  Video Exam    There were no vitals filed for this visit      Physical Exam

## 2023-03-13 ENCOUNTER — TELEPHONE (OUTPATIENT)
Dept: OBGYN CLINIC | Facility: CLINIC | Age: 58
End: 2023-03-13

## 2023-03-13 NOTE — TELEPHONE ENCOUNTER
----- Message from Reyes Salguero sent at 3/13/2023  9:56 AM EDT -----  Regarding: Ultrasound  Contact: [phone]  I saw in your notes about doing the ultrasound on my thyroid but no one has called to schedule yet     I also am down to my last week of meds that we spoke about for Hashimotos Thyroiditis

## 2023-03-16 DIAGNOSIS — E03.8 HYPOTHYROIDISM DUE TO HASHIMOTO'S THYROIDITIS: ICD-10-CM

## 2023-03-16 DIAGNOSIS — E06.3 HYPOTHYROIDISM DUE TO HASHIMOTO'S THYROIDITIS: ICD-10-CM

## 2023-03-16 DIAGNOSIS — N95.1 MENOPAUSAL SYMPTOMS: Primary | ICD-10-CM

## 2023-03-16 RX ORDER — ESTRADIOL 0.05 MG/D
1 PATCH TRANSDERMAL WEEKLY
Qty: 4 PATCH | Refills: 11 | Status: SHIPPED | OUTPATIENT
Start: 2023-03-16

## 2023-03-16 RX ORDER — LEVOTHYROXINE SODIUM 0.05 MG/1
50 TABLET ORAL DAILY
Qty: 30 TABLET | Refills: 11 | Status: SHIPPED | OUTPATIENT
Start: 2023-03-16

## 2023-03-16 RX ORDER — PROGESTERONE 100 MG/1
100 CAPSULE ORAL
Qty: 30 CAPSULE | Refills: 11 | Status: SHIPPED | OUTPATIENT
Start: 2023-03-16

## 2023-03-22 ENCOUNTER — HOSPITAL ENCOUNTER (OUTPATIENT)
Dept: RADIOLOGY | Facility: MEDICAL CENTER | Age: 58
Discharge: HOME | End: 2023-03-22

## 2023-03-22 DIAGNOSIS — E06.3 HASHIMOTO'S THYROIDITIS: ICD-10-CM

## 2023-03-28 ENCOUNTER — PROCEDURE VISIT (OUTPATIENT)
Dept: NEUROLOGY | Facility: CLINIC | Age: 58
End: 2023-03-28

## 2023-03-28 VITALS — DIASTOLIC BLOOD PRESSURE: 73 MMHG | TEMPERATURE: 97.8 F | SYSTOLIC BLOOD PRESSURE: 173 MMHG | HEART RATE: 63 BPM

## 2023-03-28 DIAGNOSIS — G43.709 CHRONIC MIGRAINE WITHOUT AURA WITHOUT STATUS MIGRAINOSUS, NOT INTRACTABLE: Primary | ICD-10-CM

## 2023-03-28 NOTE — PROGRESS NOTES
Universal Protocol   Consent: Verbal consent obtained  Written consent obtained    Risks and benefits: risks, benefits and alternatives were discussed  Consent given by: patient  Patient understanding: patient states understanding of the procedure being performed  Patient consent: the patient's understanding of the procedure matches consent given  Procedure consent: procedure consent matches procedure scheduled        Chemodenervation     Date/Time 3/28/2023 7:42 AM     Performed by  Erik Mercer PA-C     Authorized by Erik Mercer PA-C        Pre-procedure details      Prepped With: Alcohol     Procedure details     Position:  Upright   Botox     Botox Type:  Type A    Brand:  Botox    mL's of Botulinum Toxin:  190    Final Concentration per CC:  100 units    Needle Gauge:  30 G 2 5 inch   Procedures     Botox Procedures: chronic headache      Indications: migraines     Injection Location      Head / Face:  L superior trapezius, R superior trapezius, L superior cervical paraspinal, R superior cervical paraspinal, L , R , procerus, L temporalis, R temporalis, R frontalis, L frontalis, R medial occipitalis and L medial occipitalis    L  injection amount:  5 unit(s)    R  injection amount:  5 unit(s)    L lateral frontalis:  5 unit(s)    R lateral frontalis:  5 unit(s)    L medial frontalis:  5 unit(s)    R medial frontalis:  5 unit(s)    L temporalis injection amount:  20 unit(s)    R temporalis injection amount:  20 unit(s)    Procerus injection amount:  5 unit(s)    L medial occipitalis injection amount:  15 unit(s)    R medial occipitalis injection amount:  15 unit(s)    L superior cervical paraspinal injection amount:  10 unit(s)    R superior cervical paraspinal injection amount:  10 unit(s)    L superior trapezius injection amount:  15 unit(s)    R superior trapezius injection amount:  15 unit(s)   Total Units     Total units used:  190    Total units discarded: 10   Post-procedure details      Chemodenervation:  Chronic migraine    Facial Nerve Location[de-identified]  Bilateral facial nerve    Patient tolerance of procedure: Tolerated well, no immediate complications   Comments      Extra units medically necessary:  - 20 between both temporalis muscles  - 15 between both upper traps       Blood pressure (!) 173/73, pulse 63, temperature 97 8 °F (36 6 °C), temperature source Temporal, not currently breastfeeding

## 2023-03-28 NOTE — PATIENT INSTRUCTIONS
Botox Therapy  Important Information    Our goal is to make sure you fully understand how Botox Therapy treatment may benefit you and to help you understand how you can play an active role in your treatments and ongoing care  Please review the following information below  Call our office IMMEDIATELY @ [phone] and speak to one of our Botox Coordinators if you have a change in insurance  (a prior authorization is required and accurate information is vital)  Please call at least 24 hours in advance if you can't make your appointment  Appointments are scheduled every 91 days (this will be scheduled in advance before leaving the office)  You must allow for at least 2-3 treatments to determine if Botox is right for you  It may take a few weeks to see a response from treatment  No hair dye or scalp massage or treatment within 24 hours of treatment  We encourage you to use a headache diary or journal to document your headache frequency and severity  You can also utilize the Migraine Landry francis (downloadable on [employer])  Sign up for the Botox Savings Program  (commercial insurance patients may qualify) Sign up at [employer] or call [phone] Option: 4  To get more information on Botox therapy for Chronic Migraines and see frequently asked questions, please visit [facility]  If you have any questions or concerns, please speak to one of our Botox Coordinators at [phone]  We look forward to servicing you!

## 2023-04-12 PROBLEM — I31.9 CHRONIC IDIOPATHIC PERICARDITIS: Status: ACTIVE | Noted: 2023-04-12

## 2023-04-26 ENCOUNTER — OFFICE VISIT (OUTPATIENT)
Dept: OBGYN CLINIC | Facility: CLINIC | Age: 58
End: 2023-04-26

## 2023-04-26 VITALS
BODY MASS INDEX: 30.32 KG/M2 | HEIGHT: 65 IN | SYSTOLIC BLOOD PRESSURE: 128 MMHG | DIASTOLIC BLOOD PRESSURE: 70 MMHG | WEIGHT: 182 LBS

## 2023-04-26 DIAGNOSIS — R68.82 DECREASED LIBIDO: Primary | ICD-10-CM

## 2023-04-26 DIAGNOSIS — E27.9 ADRENAL GLAND DYSFUNCTION (HCC): ICD-10-CM

## 2023-04-26 DIAGNOSIS — E04.2 MULTIPLE THYROID NODULES: ICD-10-CM

## 2023-04-26 NOTE — PROGRESS NOTES
Assessment/Plan:       Diagnoses and all orders for this visit:    Decreased libido  -     other medication, see sig,; Medication/product name: testosterone cream  Strength: 4mg/ml   Sig (include dose, route, frequency): apply 0 5 ml to labia daily    Multiple thyroid nodules    Adrenal gland dysfunction (HCC)        1) 24 hr cortisol testing was first reviewed from [facility], noting am value to be above high normal, rest of values were in yellow range ( just above normal)  Other labs had been previously drawn by prior provider, noting total testosterone low at 21, free Test 1 4 ( low)  2) We discussed adrenal activation in light of chronic stressors and ovarian/thryoid decline, but this is mild  I think she is doing much better, but offered Cortisol [facility] to take at night to help reduce night time cortisol levels slightly, which should help greatly with sleep quality  3) Testosterone supplementation discussed in detail, including lack of FDA approval for women and cost concerns, as insurance will not reimburse  Side effects include: increased libido, increased muscle mass, decreased fat mass, erythrocytosis ( NOT polycythemia rubra), increased energy, acne, increased hair growth or loss  She would like to try this, I agree  Testosterone cream 2mg/daily cream sent to [address] [place] [address]  4) May stop DHEA if starting testosterone  5) Follow up by email in one month as to progress report  I reminded her that testosterone may take a couple months to be at full swing  6) Repeat thyroid labs in 3-6 months, as well as possible thyroid US  I offered ENT referral to consider FNA, she will consider if swallowing does not improve  7)Follow up in one month with email or prn  This was a 50 minute visit with greater than 50% of time spent in face to face counseling and coordination of care  Subjective:      Patient ID: Amanda Vital is a 62 y o  female      Kolby Ache returns for follow up of "hormonal consult  I saw her last month with complaints of weight gain, dysphoric mood, low libido,no energy  She had sought the care from online hormone clinic and was treated with BHRT including: Biest, Progesterone SR, Pregnenolone/DHEA and compounded T3/T4  She preferred to have someone local regulate this, her previous provider was a [facility]  I had recommended simplifying her therapy to: Climara patch 0 05 mg/oral progesterone 100 mg daily and levothryxine 50 mcg to local pharmacy  DHEA 15 mg supplement sent through [employer]  I also recommended a Paleo food plan approach to reduce gluten  Finally a thyroid US was ordered as she has history of thyroid nodules/autoimmune thyroiditis  One dominant nodule noted, 1 3 cm, expectant management recommended  Since our last visit:  1) She feels much better with increased energy  2) Has lost 12 lbs! Motivated as her son's wedding is next month  3) Sleep: mild improvement  4) Libido still not good  5) Hair loss has improved, has noticed new hair growth! Review of Systems   Constitutional: Positive for appetite change and unexpected weight change  Negative for fatigue  HENT: Negative for trouble swallowing  Feels nodule in thyroid, see HPI   Gastrointestinal: Negative  Endocrine: Negative  Genitourinary:        Decreased libido, see HPI   Musculoskeletal: Negative for arthralgias  Neurological: Negative  Psychiatric/Behavioral: Negative for dysphoric mood and sleep disturbance  Objective:      /70 (BP Location: Left arm, Patient Position: Sitting, Cuff Size: Standard)   Ht 5' 5\" (1 651 m)   Wt 82 6 kg (182 lb)   BMI 30 29 kg/m²          Physical Exam  Constitutional:       Appearance: Normal appearance  Neurological:      Mental Status: She is alert           "

## 2023-05-16 ENCOUNTER — TELEPHONE (OUTPATIENT)
Dept: NEUROLOGY | Facility: CLINIC | Age: 58
End: 2023-05-16

## 2023-05-16 NOTE — TELEPHONE ENCOUNTER
[address], spoke to rep that advised pt needs new script  Transferred to Pharmacist Mitch  to give Vo below  Botox 200 UNITS  Qty  1  DX [id number]  Sig: Inject up to 200 UNITS I M into the various sites in the head and neck once every three months for one year with  Refills: 3     If you agree to this order transcribed above please respond directly if you agree or not, so I may proceed further with authorization and for use of Verbal Order  Thank you

## 2023-05-18 NOTE — TELEPHONE ENCOUNTER
My name is Linda Willingham and I'm calling from [facility] pharmacy, this message is in regards to a mutual patient  Pt's name is [employer]  Patient's YOB: 1965  We are calling to schedule a delivery of the patient's, botox  Please return our call at your earliest convenience  Thank you   [place]  [phone]

## 2023-05-18 NOTE — TELEPHONE ENCOUNTER
[address], spoke to Anderson ELIAS that advised pt Botox is ready for delivery 5/23/2023  Botox 200 UNITS  Qty  1  Scheduled:5/23/2023  Location:[facility]   Via: [employer]    Please advise if Botox does not arrive  Thank you

## 2023-05-23 ENCOUNTER — TELEPHONE (OUTPATIENT)
Dept: OBGYN CLINIC | Facility: CLINIC | Age: 58
End: 2023-05-23

## 2023-05-23 NOTE — TELEPHONE ENCOUNTER
Botox number of units: 200  Botox quantity: 1  Arrived at what location: CV  Botox at [facility]: yes  NDC number: [id number]  Lot number: [id number]  Expiration Date: 02/2026  Appt notes indicate correct medication: yes

## 2023-05-23 NOTE — TELEPHONE ENCOUNTER
Insurance called about denied claim for cortisol and DHEA tests. The tests were billed as an experimental service. We need to submit info that the tests were medically necessary. Insurance states this info was previously requested. If not yet sent, please send to [facility] of [address] [place] [address] at [address] [facility], [address] [place] [address]. If the tests were part of a trial or experiment, we need to explain.

## 2023-05-25 ENCOUNTER — TELEPHONE (OUTPATIENT)
Dept: OBGYN CLINIC | Facility: CLINIC | Age: 58
End: 2023-05-25

## 2023-05-25 NOTE — TELEPHONE ENCOUNTER
Called ins pharmacy benefit at [phone] spoke to Tony, he said no Romeo Rivera is needed but will have to have grant send in a claim with the ingredients to get coverage, advised patient take up a claim form or call grant for submission

## 2023-05-25 NOTE — TELEPHONE ENCOUNTER
----- Message from Usha Menard sent at 5/25/2023  8:00 AM EDT -----  Regarding: FW: Prior auth    ----- Message -----  From: Noni Dupont MD  Sent: 5/25/2023   7:58 AM EDT  To: [facility]  Subject: Prior auth                                       Hello,  Another big favor to ask  Pleass see Mary's email message to me  She is desiring submitting testosterone for prior auth coverage  You should be able to give them my office notes  I am not interested in fighting too hard on this  She knew from the get go that testosterone is not covered by insurance  Thank you   Totally not urgent  [facility]

## 2023-05-25 NOTE — TELEPHONE ENCOUNTER
----- Message from Usha Menard sent at 5/25/2023  8:00 AM EDT -----  Regarding: FW: Prior auth    ----- Message -----  From: Noni Dupont MD  Sent: 5/25/2023   7:58 AM EDT  To: [facility]  Subject: Prior auth                                       Hello,  Another big favor to ask  Pleass see Mary's email message to me  She is desiring submitting testosterone for prior auth coverage  You should be able to give them my office notes  I am not interested in fighting too hard on this  She knew from the get go that testosterone is not covered by insurance  Thank you   Totally not urgent  [facility]

## 2023-05-30 DIAGNOSIS — G43.709 CHRONIC MIGRAINE WITHOUT AURA WITHOUT STATUS MIGRAINOSUS, NOT INTRACTABLE: ICD-10-CM

## 2023-05-30 RX ORDER — KETOROLAC TROMETHAMINE 10 MG/1
TABLET, FILM COATED ORAL
Qty: 15 TABLET | Refills: 2 | Status: SHIPPED | OUTPATIENT
Start: 2023-05-30

## 2023-05-31 DIAGNOSIS — N95.1 MENOPAUSAL SYMPTOMS: Primary | ICD-10-CM

## 2023-05-31 RX ORDER — ESTRADIOL 0.5 MG/1
0.5 TABLET ORAL DAILY
Qty: 30 TABLET | Refills: 11 | Status: SHIPPED | OUTPATIENT
Start: 2023-05-31 | End: 2023-06-09 | Stop reason: DRUGHIGH

## 2023-06-09 DIAGNOSIS — N95.1 MENOPAUSAL SYMPTOMS: Primary | ICD-10-CM

## 2023-06-09 RX ORDER — ESTRADIOL 1 MG/1
1 TABLET ORAL DAILY
Qty: 30 TABLET | Refills: 11 | Status: SHIPPED | OUTPATIENT
Start: 2023-06-09

## 2023-07-05 ENCOUNTER — PROCEDURE VISIT (OUTPATIENT)
Dept: NEUROLOGY | Facility: CLINIC | Age: 58
End: 2023-07-05
Payer: COMMERCIAL

## 2023-07-05 ENCOUNTER — HOSPITAL ENCOUNTER (OUTPATIENT)
Dept: RADIOLOGY | Facility: MEDICAL CENTER | Age: 58
Discharge: HOME | End: 2023-07-05
Payer: COMMERCIAL

## 2023-07-05 VITALS
SYSTOLIC BLOOD PRESSURE: 126 MMHG | HEIGHT: 65 IN | WEIGHT: 182 LBS | DIASTOLIC BLOOD PRESSURE: 80 MMHG | TEMPERATURE: 97.5 F | HEART RATE: 100 BPM | BODY MASS INDEX: 30.32 KG/M2

## 2023-07-05 DIAGNOSIS — Z12.31 ENCOUNTER FOR SCREENING MAMMOGRAM FOR MALIGNANT NEOPLASM OF BREAST: ICD-10-CM

## 2023-07-05 DIAGNOSIS — G43.709 CHRONIC MIGRAINE WITHOUT AURA WITHOUT STATUS MIGRAINOSUS, NOT INTRACTABLE: Primary | ICD-10-CM

## 2023-07-05 PROCEDURE — 77063 BREAST TOMOSYNTHESIS BI: CPT

## 2023-07-05 PROCEDURE — 64615 CHEMODENERV MUSC MIGRAINE: CPT | Performed by: PHYSICIAN ASSISTANT

## 2023-07-05 PROCEDURE — 77067 SCR MAMMO BI INCL CAD: CPT

## 2023-07-05 RX ORDER — KETOROLAC TROMETHAMINE 10 MG/1
TABLET, FILM COATED ORAL
Qty: 15 TABLET | Refills: 2 | Status: SHIPPED | OUTPATIENT
Start: 2023-07-05

## 2023-07-05 RX ORDER — PROCHLORPERAZINE MALEATE 10 MG
10 TABLET ORAL EVERY 8 HOURS PRN
Qty: 30 TABLET | Refills: 3 | Status: SHIPPED | OUTPATIENT
Start: 2023-07-05

## 2023-07-05 NOTE — TELEPHONE ENCOUNTER
Called patient , said she sent in the claim form from [facility] but was only reimbursed $9 for her testosterone Because [facility] was out of netowrk for her. This is out of our control. Advised patient seek a compounding pharmacy that is in network and we can send her prescription there.  She will let us know

## 2023-07-05 NOTE — PROGRESS NOTES
Universal Protocol   Consent: Verbal consent obtained. Written consent obtained.   Risks and benefits: risks, benefits and alternatives were discussed  Consent given by: patient  Patient understanding: patient states understanding of the procedure being performed  Patient consent: the patient's understanding of the procedure matches consent given  Procedure consent: procedure consent matches procedure scheduled        Chemodenervation     Date/Time 7/5/2023 9:00 AM     Performed by  Andie Vega PA-C   Authorized by Andie Vega PA-C       Pre-procedure details      Prepped With: Alcohol     Procedure details     Position:  Upright   Botox     Botox Type:  Type A    Brand:  Botox    mL's of Botulinum Toxin:  190    Final Concentration per CC:  100 units    Needle Gauge:  30 G 2.5 inch   Procedures     Botox Procedures: chronic headache      Indications: migraines     Injection Location      Head / Face:  L superior trapezius, R superior trapezius, L superior cervical paraspinal, R superior cervical paraspinal, L , R , procerus, L temporalis, R temporalis, R frontalis, L frontalis, R medial occipitalis and L medial occipitalis    L  injection amount:  5 unit(s)    R  injection amount:  5 unit(s)    L lateral frontalis:  5 unit(s)    R lateral frontalis:  5 unit(s)    L medial frontalis:  5 unit(s)    R medial frontalis:  5 unit(s)    L temporalis injection amount:  20 unit(s)    R temporalis injection amount:  20 unit(s)    Procerus injection amount:  5 unit(s)    L medial occipitalis injection amount:  15 unit(s)    R medial occipitalis injection amount:  15 unit(s)    L superior cervical paraspinal injection amount:  10 unit(s)    R superior cervical paraspinal injection amount:  10 unit(s)    L superior trapezius injection amount:  15 unit(s)    R superior trapezius injection amount:  15 unit(s)   Total Units     Total units used:  190    Total units discarded:  10 Post-procedure details      Chemodenervation:  Chronic migraine    Facial Nerve Location[de-identified]  Bilateral facial nerve    Patient tolerance of procedure: Tolerated well, no immediate complications   Comments      Extra units medically necessary:  - 20 between both temporalis muscles  - 15 between both upper traps       Blood pressure 126/80, pulse 100, temperature 97.5 °F (36.4 °C), temperature source Temporal, height 5' 5" (1.651 m), weight 82.6 kg (182 lb), not currently breastfeeding. Body mass index is 30.29 kg/m².

## 2023-07-05 NOTE — PATIENT INSTRUCTIONS
Headache management instructions  - When patient has a moderate to severe headache, they should seek rest, initiate relaxation and apply cold compresses to the head. - Maintain regular sleep schedule. Adults need at least 7-8 hours of uninterrupted a night. - Limit over the counter medications such as Tylenol, Ibuprofen, Aleve, Excedrin. (No more than 2- 3 times a week or max 10 a month). - Maintain headache diary. Free FRANCIS for a smart phone, which can be used is "Migraine landry"  - Limit caffeine to 1-2 cups 8 to 16 oz a day or less. - Avoid dietary trigger. (aged cheese, peanuts, MSG, aspartame and nitrates). - Patient is to have regular frequent meals to prevent headache onset. - Please drink at least 64 ounces of water a day to help remain hydrated. Botox Therapy  Important Information    Our goal is to make sure you fully understand how Botox Therapy treatment may benefit you and to help you understand how you can play an active role in your treatments and ongoing care. Please review the following information below. Call our office IMMEDIATELY @ [phone] and speak to one of our Botox Coordinators if you have a change in insurance. (a prior authorization is required and accurate information is vital)  Please call at least 24 hours in advance if you can't make your appointment. Appointments are scheduled every 91 days (this will be scheduled in advance before leaving the office)  You must allow for at least 2-3 treatments to determine if Botox is right for you. It may take a few weeks to see a response from treatment. No hair dye or scalp massage or treatment within 24 hours of treatment  We encourage you to use a headache diary or journal to document your headache frequency and severity.  You can also utilize the Migraine Landry francis (downloadable on [employer])  Sign up for the Botox Savings Program. (commercial insurance patients may qualify) Sign up at [email or web address] or call [phone] Option: 4  To get more information on Botox therapy for Chronic Migraines and see frequently asked questions, please visit [email or web address]. [employer]  If you have any questions or concerns, please speak to one of our Botox Coordinators at [phone]. We look forward to servicing you!

## 2023-07-27 DIAGNOSIS — R68.82 LOW LIBIDO: ICD-10-CM

## 2023-07-27 DIAGNOSIS — G47.9 SLEEP DISTURBANCE: Primary | ICD-10-CM

## 2023-08-02 ENCOUNTER — PATIENT MESSAGE (OUTPATIENT)
Dept: NEUROLOGY | Facility: CLINIC | Age: 58
End: 2023-08-02

## 2023-08-02 DIAGNOSIS — G43.709 CHRONIC MIGRAINE WITHOUT AURA WITHOUT STATUS MIGRAINOSUS, NOT INTRACTABLE: Primary | ICD-10-CM

## 2023-08-03 DIAGNOSIS — R42 VERTIGO: Primary | ICD-10-CM

## 2023-08-03 DIAGNOSIS — G43.109 VERTIGINOUS MIGRAINE: ICD-10-CM

## 2023-08-04 RX ORDER — MECLIZINE HCL 12.5 MG/1
TABLET ORAL
Qty: 60 TABLET | Refills: 2 | Status: SHIPPED | OUTPATIENT
Start: 2023-08-04

## 2023-08-07 ENCOUNTER — TELEPHONE (OUTPATIENT)
Dept: NEUROLOGY | Facility: CLINIC | Age: 58
End: 2023-08-07

## 2023-08-07 NOTE — TELEPHONE ENCOUNTER
I called and spoke with pharmacist at [address] [place] [address], and patient picked up Meclizine Rx on 8/4 at 6:30 pm.

## 2023-08-09 ENCOUNTER — EVALUATION (OUTPATIENT)
Dept: PHYSICAL THERAPY | Facility: MEDICAL CENTER | Age: 58
End: 2023-08-09
Payer: COMMERCIAL

## 2023-08-09 DIAGNOSIS — R42 VERTIGO: ICD-10-CM

## 2023-08-09 PROCEDURE — 97162 PT EVAL MOD COMPLEX 30 MIN: CPT | Performed by: PHYSICAL THERAPIST

## 2023-08-09 NOTE — PROGRESS NOTES
PT Evaluation       Today's date: 2023  Patient name: Kiya Art  : 1965  MRN: [id number]  Referring provider: Mallory Knight  Dx:   Encounter Diagnosis     ICD-10-CM    1. Vertigo  R42 Ambulatory Referral to Physical Therapy            Assessment  Assessment details: Patient is a 62 y.o. Female who presents to skilled outpatient PT with complaints of vertigo. Patient reports spinning dizziness along with fogginess and imbalance that has recently worsened over the last few weeks. Cervical spine integrity intact per normal and negative results of mVBI, Sharp Asad, and Alar Stability Tests respectively. Cervical AROM also WNL with minor pain reported at end-ROM during B/L lateral flexion. During positional testing, no observable nystagmus was seen however patient did report increased subjective spinning dizziness with R and L [place]-Hallpike positioning. Due to patient increase in severity of symptoms with L Jacobo-Hallpike position, L Epley maneuver was trialed 2x with patient reporting an overall reduction in symptoms. Educated patient on possible BPPV and symptoms, along with potential for residual dizziness for up to 48 hours following session, and to seek higher level of care if symptoms worsen or change and She was in good verbal understanding. Plan to retest positionals next session, along with static/dynamic balance and oculomotor screen if symptoms persist. Patient will benefit from skilled outpatient PT in order to reduce dizziness symptoms and return to PLOF. Patient verbalized understanding of POC. Please contact me if you have any questions or recommendations. Thank you for the referral and the opportunity to share in [address] [place] [address].       Cut off score   All date taken from [facility] Neuro Section or Rehab Measures    DGI:  [facility]: 4 points  [facility]er [facility]: 3 Points  Falls risk cut off: <19/24    FGA:  [facility]: 4 points  Geriatrics/[facility] Older Adults: </= 22/30 fall risk  Geriatrics/[facility] Older Adults: </= 20/30 unexplained falls in the next 6 months  Parkinsons: </= 18/30 fall risk    mCTSIB (normed on ages 24-63, lower number is less sway or better static balance)  Eyes open firm surface (norm 0.21-0.48)  Eyes closed firm surface (norm 0.48-0.99)  Eyes open foam surface (norm 0.38-0.71)  Eyes closed foam surface (norm 0.70-2.22)    DHI:  0-39: low perception of handicap  40-69: moderate perception of handicap  : severe perception of handicap  > 60: increased risk for falls    Joint Position Error Testing (JPET):  > 4.5 degrees: abnormal joint proprioception        Impairments: Abnormal coordination, abnormal gait, abnormal muscle tone, abnormal or restricted ROM, activity intolerance, impaired balance, impaired physical strength, lacks appropriate HEP, poor posture, poor body mechanics, pain with function, safety issue, abnormal movement  Understanding of Dx/Px/POC: good  Prognosis: good      Goals    Vestibular Short Term Goals (4 weeks):  - Patient will report complete resolution of symptoms in order to promote return to PLOF  - Patient will complete FGA in order to promote return to safe performance of ADLs   - Patient will demonstrate (-) Jacobo-Hallpike test on B/L side  - Patient will be independent with simple HEP  - Patient will tolerate 60 seconds of oculomotor exercises with minimal increase in symptoms  - Patient will be able to tolerate 30 seconds with eyes closed on foam surface without any loss of balance demonstrating improvement in vestibular system    Vestibular Long Term Goals (12 weeks):  - Patient will be independent with complex HEP  - Patient will tolerate >=2 minutes of oculomotor exercises to facilitate return to reading and computer work  - Patient will demonstrate ability to perform HT in gait without veering  - Patient will score low risk for falls with FGA test with score of 23/30 or higher per current research data  - Patient will report baseline dizziness of 1/10 or less   - Patient will report 2/10 dizziness or less with visual stimulating surround with duration of 2 minutes   - Patient will report subjective improvement to 90% or higher to promote return to PLOF      Plan  Plan details: Oculomotor screen, FGA  Patient would benefit from: Skilled PT  Planned modality interventions: Biofeedback, Cryotherapy, TENS, Thermotherapy  Planned therapy interventions: Balance, balance/WB training, flexibility, functional ROM exercises, gait training, HEP, manual therapy, motor coordination training, neuromuscular re-education, patient education, postural training, strengthening, stretching, therapeutic activities, therapeutic exercises,   Frequency: 2x/wk  Duration in weeks: 12  Plan of Care beginning date: 2023  Plan of Care expiration date: 12 weeks - 2023  Treatment plan discussed with: Patient        Subjective Evaluation    History of Present Illness  Mechanism of injury: Patient reports brief periods of dizziness that come and go randomly, but can last all day. She states that she feels "foggy and nauseous" over the last 2 weeks. She also reports increased pressure in her right ear.        Dizziness Subjective  How long does dizziness last: variable  How would you describe the dizziness: lightheaded/fogginess/off-balance  Rolling in bed: Yes  Supine to/from sit: Yes  Recent hearing loss: No  Tinnitus: No  Aural fullness/ear pain: No      Red Flag Screen  - Numbness: No  - Tingling: No  - Weakness: No        Pain  Current pain ratin/10      Social Support  Steps to enter house: 6 steps  Stairs in house: 14 steps   Lives in: house    Employment status:       Treatments  Previous treatment: PT        Objective     HR: 60 bpm  SpO2: 98%  BP: 150/90    Vestibular Objective  Cervical Spine AROM:  - Flexion: WFL no pain  - Extension: WFL no pain  - R Rotation: WFL no pain  - L Rotation: WFL no pain  - R Lateral Flexion: WFL pain at end range  - L Lateral Flexion: WFL pain at end range    Integrity Testing  - mVBI: WNL  - Sharp Asad:  WNL  - Alar Stability Test: WNL      Oculomotor Screen- NEXT SESSION  - Baseline Symptoms: /10  - Baseline Observation:   - Gaze Holding Nystagmus:   - Spontaneous Nystagmus Room Light:  - Smooth Pursuits (central):   - Saccades (central):   - Near Point Convergence (normal: < 4"/10 cm - central):   - VORx1:  - VOR Cancel (central):   - Head Thrust (moderate to severe hypofunction):  - Head Shaking Test (mild hypofunction):       BPPV Screen  - L [place]-Hallpike: (+) subjective dizziness, (-) observable nystagmus   - R [place]-Hallpike: (+) subjective dizziness, (-) observable nystagmus   - L Horizontal Roll: (-) subjective dizziness, (-) observable nystagmus   - R Horizontal Roll: (-) subjective dizziness, (-) observable nystagmus     L Epley Maneuver x2      Outcome Measures Initial Eval          mCTSIB  - FTEO (firm)  - FTEC (firm)  - FTEO (foam)  - FTEC (foam)    sec   sec   sec   sec        DGI /24        FGA /30        DHI /100        JPET  degrees                                                          Precautions:   Past Medical History:   Diagnosis Date   • Abnormal Pap smear of cervix 1989    all normal since   • Asthma    • Carbon monoxide exposure 02/06/2018   • Chronic back pain    • Hashimoto's thyroiditis 01/09/2023   • Lump of skin     last assessed 11/21/13   • Migraine    • Sinus bradycardia     last assessed 10/25/16   • Varicella

## 2023-08-14 RX ORDER — DEXAMETHASONE 2 MG/1
TABLET ORAL
Qty: 5 TABLET | Refills: 0 | Status: SHIPPED | OUTPATIENT
Start: 2023-08-14

## 2023-08-15 ENCOUNTER — TELEPHONE (OUTPATIENT)
Dept: NEUROLOGY | Facility: CLINIC | Age: 58
End: 2023-08-15

## 2023-08-15 NOTE — TELEPHONE ENCOUNTER
Patient calling with questions regarding medication. Requesting call back. Call returned to patient, [phone]. All questions answered regarding decadron, toradol and compazine. Nothing further needed at this time.

## 2023-08-16 ENCOUNTER — OFFICE VISIT (OUTPATIENT)
Dept: PHYSICAL THERAPY | Facility: MEDICAL CENTER | Age: 58
End: 2023-08-16
Payer: COMMERCIAL

## 2023-08-16 DIAGNOSIS — R42 VERTIGO: Primary | ICD-10-CM

## 2023-08-16 PROCEDURE — 97530 THERAPEUTIC ACTIVITIES: CPT | Performed by: PHYSICAL THERAPIST

## 2023-08-16 PROCEDURE — 97112 NEUROMUSCULAR REEDUCATION: CPT | Performed by: PHYSICAL THERAPIST

## 2023-08-16 NOTE — PROGRESS NOTES
Daily Note     Today's date: 2023  Patient name: Blane Matute  : 1965  MRN: [id number]  Referring provider: Tracy Lee  Dx:   Encounter Diagnosis     ICD-10-CM    1. Vertigo  R42                      Subjective: Patient reports significant reduction in her dizziness since her evaluation      Objective: See treatment diary below      Oculomotor Screen  - Baseline Symptoms: 0/10  - Gaze Holding Nystagmus: Normal  - Spontaneous Nystagmus Room Light: Normal  - Smooth Pursuits (central): Mild "lightheadedness"  - Saccades (central): Normal  - Near Point Convergence (normal: < 4"/10 cm - central): WNL, 3"  - VORx1: Abnormal, Dizziness 7/10 (horizontal), 3/10 (vertical)  - VOR Cancel (central): Abnormal, Dizziness 2/10      BPPV Screen  - L [place]-Hallpike: (-) subjective dizziness, (-) observable nystagmus   - R [place]-Hallpike: (-) subjective dizziness, (-) observable nystagmus       NMR:  VOR x1 (30 sec, self-paced)  - H: 5/10, 3/10  - V: 2/10, 1/10    VOR Cx (10x ea)  - H: 5/10   - V: 3/10    - Ambulation w/ HT/HN: 5 laps ea, 20 ft    HEP: VOR x1, VOR Cx      Assessment: Patient tolerated treatment well. Continued with testing, with patient reporting no increase in subjective dizziness during B/L [place]-Hallpike. Oculomotor screen revealed significant increase in dizziness with VOR x1 and VOR Cx testing, especially when moving in horizontal direction. Provided patient with HEP, reviewed all exercises, and discussed safety of exercising at home, patient verbalized agreements. Patient will benefit from skilled outpatient PT in order to reduce dizziness symptoms and return to PLOF. Plan: Continue per plan of care.         Outcome Measures Initial Eval          mCTSIB  - FTEO (firm)  - FTEC (firm)  - FTEO (foam)  - FTEC (foam)    sec   sec   sec   sec        DGI /24        FGA /30        DHI /100        JPET  degrees

## 2023-08-21 ENCOUNTER — APPOINTMENT (OUTPATIENT)
Dept: PHYSICAL THERAPY | Facility: MEDICAL CENTER | Age: 58
End: 2023-08-21
Payer: COMMERCIAL

## 2023-08-23 ENCOUNTER — OFFICE VISIT (OUTPATIENT)
Dept: PHYSICAL THERAPY | Facility: MEDICAL CENTER | Age: 58
End: 2023-08-23
Payer: COMMERCIAL

## 2023-08-23 DIAGNOSIS — R42 VERTIGO: Primary | ICD-10-CM

## 2023-08-23 PROCEDURE — 97112 NEUROMUSCULAR REEDUCATION: CPT | Performed by: PHYSICAL THERAPIST

## 2023-08-23 NOTE — PROGRESS NOTES
Daily Note     Today's date: 2023  Patient name: Irma Izquierdo  : 1965  MRN: [id number]  Referring provider: Alf Rodriguez  Dx:   Encounter Diagnosis     ICD-10-CM    1. Vertigo  R42                      Subjective: Patient reports random bouts of dizziness throughout the day      Objective: See treatment diary below    HR: 81 bpm  SpO2: 99%  BP: 158/90 mmHg       NMR:  - Sidestepping on foam beam: 5 laps x 1 beam  - Sidestepping on foam beam w/ hurdles: 5 laps x 1 beam  - Tandem ambulation: 5 laps x 10 ft  - Ambulation w/ VOR Cx (horizontal): 5 laps x 20 ft  - Ambulation w/ VOR Cx (vertical): 5 laps x 20 ft  - Cone stack w/ 180 degree turns: 10x 2 sets        HEP: VOR x1, VOR Cx      Assessment: Patient tolerated treatment well. Continued with vestibular exercises, adding ambulation to increase difficulty and challenge patient dynamic balance. Patient displays minor lateral sway but maintains balance without assistance. Throughout treatment, dizziness provoked reached approximately 5/10 level and was reduced to baseline with standing rest break prior to continuing to prevent excess dizziness. Plan to progress head turning exercises as tolerated by patient. Patient will benefit from skilled outpatient PT in order to reduce dizziness symptoms and return to PLOF. Plan: Continue per plan of care.         Outcome Measures Initial Eval          mCTSIB  - FTEO (firm)  - FTEC (firm)  - FTEO (foam)  - FTEC (foam)    sec   sec   sec   sec        DGI /24        FGA /30        DHI /100        JPET  degrees

## 2023-08-24 ENCOUNTER — HOSPITAL ENCOUNTER (OUTPATIENT)
Dept: RADIOLOGY | Facility: MEDICAL CENTER | Age: 58
Discharge: HOME | End: 2023-08-24
Payer: COMMERCIAL

## 2023-08-24 DIAGNOSIS — R42 DIZZINESS: ICD-10-CM

## 2023-08-24 DIAGNOSIS — K11.20 PAROTITIS: ICD-10-CM

## 2023-08-24 DIAGNOSIS — R22.1 NECK SWELLING: ICD-10-CM

## 2023-08-24 DIAGNOSIS — M26.621 ARTHRALGIA OF RIGHT TEMPOROMANDIBULAR JOINT: ICD-10-CM

## 2023-08-24 DIAGNOSIS — R26.89 IMBALANCE: ICD-10-CM

## 2023-08-24 PROCEDURE — G1004 CDSM NDSC: HCPCS

## 2023-08-24 PROCEDURE — 70491 CT SOFT TISSUE NECK W/DYE: CPT

## 2023-08-24 RX ADMIN — IOHEXOL 85 ML: 350 INJECTION, SOLUTION INTRAVENOUS at 11:48

## 2023-08-28 ENCOUNTER — OFFICE VISIT (OUTPATIENT)
Dept: PHYSICAL THERAPY | Facility: MEDICAL CENTER | Age: 58
End: 2023-08-28
Payer: COMMERCIAL

## 2023-08-28 DIAGNOSIS — R42 VERTIGO: Primary | ICD-10-CM

## 2023-08-28 PROCEDURE — 97112 NEUROMUSCULAR REEDUCATION: CPT | Performed by: PHYSICAL THERAPIST

## 2023-08-28 PROCEDURE — 97110 THERAPEUTIC EXERCISES: CPT | Performed by: PHYSICAL THERAPIST

## 2023-08-28 NOTE — PROGRESS NOTES
Daily Note     Today's date: 2023  Patient name: Wisam Nguyen  : 1965  MRN: [id number]  Referring provider: Belinda Gauthier  Dx:   Encounter Diagnosis     ICD-10-CM    1. Vertigo  R42                      Subjective: Patient reports reduced dizziness today      Objective: See treatment diary below    HR: 57 bpm  SpO2: 97%  BP: 140/90 mmHg     TE:  - UT stretch: 30 sec, 3x ea  - LS stretch: 30 sec, 3x ea    NMR:    VOR x1 (60 sec, self-paced)  - H: 3/10, 5/10 (dizziness)  - V: 5/10, 4/10 (dizziness)      - Ambulation w/ VOR Cx (horizontal): 5 laps x 20 ft (1/10 dizziness)  - Ambulation w/ VOR Cx (vertical): 5 laps x 20 ft (3/10 dizziness)  - Ambulation w/ 180 degree turns: 5 laps x 20 ft        HEP: VOR x1, VOR Cx      Assessment: Patient tolerated treatment well. Continued with vestibular exercises, with patient reporting increased dizziness up to 5/10 as velocity of head turning increased. May trial metronome next session to provide more objective head turning speed. Any dizziness provoked during treatment reduced to baseline levels with rest break. During ambulation with 180 degree turns, patient reports imbalance with minimal lateral sway but no LOB. Patient will benefit from skilled outpatient PT in order to reduce dizziness symptoms and return to PLOF. Plan: Continue per plan of care.         Outcome Measures Initial Eval          mCTSIB  - FTEO (firm)  - FTEC (firm)  - FTEO (foam)  - FTEC (foam)    sec   sec   sec   sec        DGI /24        FGA /30        DHI /100        JPET  degrees

## 2023-08-30 ENCOUNTER — APPOINTMENT (OUTPATIENT)
Dept: PHYSICAL THERAPY | Facility: MEDICAL CENTER | Age: 58
End: 2023-08-30
Payer: COMMERCIAL

## 2023-09-01 DIAGNOSIS — G43.709 CHRONIC MIGRAINE WITHOUT AURA WITHOUT STATUS MIGRAINOSUS, NOT INTRACTABLE: ICD-10-CM

## 2023-09-01 RX ORDER — KETOROLAC TROMETHAMINE 10 MG/1
TABLET, FILM COATED ORAL
Qty: 15 TABLET | Refills: 2 | Status: SHIPPED | OUTPATIENT
Start: 2023-09-01

## 2023-09-02 LAB
CORTIS AM PEAK SERPL-MCNC: 13.4 MCG/DL
DHEA-S SERPL-MCNC: 39 MCG/DL (ref 5–167)
T3FREE SERPL-MCNC: 2.9 PG/ML (ref 2.3–4.2)
TSH SERPL-ACNC: 3.3 MIU/L (ref 0.4–4.5)

## 2023-09-05 LAB
CORTIS AM PEAK SERPL-MCNC: 13.4 MCG/DL
DHEA-S SERPL-MCNC: 39 MCG/DL (ref 5–167)
T3FREE SERPL-MCNC: 2.9 PG/ML (ref 2.3–4.2)
TESTOST SERPL-MCNC: 27 NG/DL (ref 2–45)
TSH SERPL-ACNC: 3.3 MIU/L (ref 0.4–4.5)

## 2023-09-13 ENCOUNTER — OFFICE VISIT (OUTPATIENT)
Dept: NEUROLOGY | Facility: CLINIC | Age: 58
End: 2023-09-13
Payer: COMMERCIAL

## 2023-09-13 VITALS
BODY MASS INDEX: 30.66 KG/M2 | HEIGHT: 65 IN | TEMPERATURE: 98.6 F | WEIGHT: 184 LBS | DIASTOLIC BLOOD PRESSURE: 75 MMHG | HEART RATE: 61 BPM | SYSTOLIC BLOOD PRESSURE: 141 MMHG

## 2023-09-13 DIAGNOSIS — G43.709 CHRONIC MIGRAINE WITHOUT AURA WITHOUT STATUS MIGRAINOSUS, NOT INTRACTABLE: ICD-10-CM

## 2023-09-13 DIAGNOSIS — G43.109 VERTIGINOUS MIGRAINE: ICD-10-CM

## 2023-09-13 DIAGNOSIS — R42 VERTIGO: ICD-10-CM

## 2023-09-13 DIAGNOSIS — G43.709 CHRONIC MIGRAINE WITHOUT AURA WITHOUT STATUS MIGRAINOSUS, NOT INTRACTABLE: Primary | ICD-10-CM

## 2023-09-13 PROBLEM — Z77.29 CARBON MONOXIDE EXPOSURE: Status: ACTIVE | Noted: 2023-09-13

## 2023-09-13 PROCEDURE — 99214 OFFICE O/P EST MOD 30 MIN: CPT | Performed by: PHYSICIAN ASSISTANT

## 2023-09-13 RX ORDER — KETOROLAC TROMETHAMINE 10 MG/1
TABLET, FILM COATED ORAL
Qty: 30 TABLET | Refills: 0 | Status: SHIPPED | OUTPATIENT
Start: 2023-09-13

## 2023-09-13 RX ORDER — GALCANEZUMAB 120 MG/ML
INJECTION, SOLUTION SUBCUTANEOUS
Qty: 3 ML | Refills: 3 | Status: SHIPPED | OUTPATIENT
Start: 2023-09-13

## 2023-09-13 NOTE — PATIENT INSTRUCTIONS
Headache management instructions  - When patient has a moderate to severe headache, they should seek rest, initiate relaxation and apply cold compresses to the head. - Maintain regular sleep schedule. Adults need at least 7-8 hours of uninterrupted a night. - Limit over the counter medications such as Tylenol, Ibuprofen, Aleve, Excedrin. (No more than 2- 3 times a week or max 10 a month). - Maintain headache diary. Free FRANCIS for a smart phone, which can be used is "Migraine lnadry"  - Limit caffeine to 1-2 cups 8 to 16 oz a day or less. - Avoid dietary trigger. (aged cheese, peanuts, MSG, aspartame and nitrates). - Patient is to have regular frequent meals to prevent headache onset. - Please drink at least 64 ounces of water a day to help remain hydrated. Botox Therapy  Important Information    Our goal is to make sure you fully understand how Botox Therapy treatment may benefit you and to help you understand how you can play an active role in your treatments and ongoing care. Please review the following information below. Call our office IMMEDIATELY @ [phone] and speak to one of our Botox Coordinators if you have a change in insurance. (a prior authorization is required and accurate information is vital)  Please call at least 24 hours in advance if you can't make your appointment. Appointments are scheduled every 91 days (this will be scheduled in advance before leaving the office)  You must allow for at least 2-3 treatments to determine if Botox is right for you. It may take a few weeks to see a response from treatment. No hair dye or scalp massage or treatment within 24 hours of treatment  We encourage you to use a headache diary or journal to document your headache frequency and severity.  You can also utilize the Migraine Landry francis (downloadable on [employer])  Sign up for the Botox Savings Program. (commercial insurance patients may qualify) Sign up at [email or web address] or call [phone] Option: 4  To get more information on Botox therapy for Chronic Migraines and see frequently asked questions, please visit [email or web address]. [email or web address]  If you have any questions or concerns, please speak to one of our Botox Coordinators at [phone]. We look forward to servicing you!

## 2023-09-13 NOTE — PROGRESS NOTES
Patient ID: Keyonna Butterfield is a 62 y.o. female. Assessment/Plan:       Diagnoses and all orders for this visit:    Chronic migraine without aura without status migrainosus, not intractable  -     Discontinue: fremanezumab-vfrm (Ajovy) 225 MG/1.5ML auto-injector; Inject 1.5 mL (225 mg total) under the skin every 30 (thirty) days  -     Discontinue: rimegepant sulfate (NURTEC) 75 mg TBDP; 1 tab q other day. No more than one dose per 24 hours. -     ketorolac (TORADOL) 10 mg tablet; TAKE 1 TABLET EVERY 6 HOURS AS NEEDED FOR MIGRAINE (WITH COMPAZINE IF NEEDED). MAX 2/DAY & 3/WEEK. Vertigo    Vertiginous migraine          Ms. Keyonna Butterfield is here for neurological follow-up for migraine headaches primarily. She had some difficulty with vertigo, likely benign positional however can be associated with her migraines. She benefited from meclizine and PT and had no further issues with vertigo at this time. She would like to continue Botox every 90 days approximately. Since starting botox, the patient reports greater than 7 days of migraine relief from baseline, correlated with headache diary, decreased abortive medication use and decreased ER visits. We will try to supplement Botox with Nurtec every other day for better migraine control and decrease frequency. Side effects reviewed. At the onset of a migraine continue cocktail as noted below. The patient should not hesitate to call me prior to her follow up with any questions or concerns. Subjective:    HPI     Ms. Keyonna Butterfield is a very pleasant 77-year-old female who is here for neurological follow-up for migraine headaches. She is here to reauthorize her Botox injections. Her migraines have been better up until recently, Botox does wear off after 8 to 10 weeks. She does have vertigo with the migraines at times. She is not sure if the Botox is wearing off quicker than before.   Regardless she does appreciate the effectiveness of the Botox and denies significant side effects. Since starting botox, the patient reports greater than 7 days of migraine relief from baseline, correlated with headache diary, decreased abortive medication use and decreased ER visits. Migraine frequency: About 3 migraine days per week. This past month they have been more frequent but not as severe. She takes a cocktail combination of: Toradol, Compazine and Decadron which is very helpful. She states she developed vertigo in the beginning of August and it was ongoing for 3 weeks, almost every single day. If she woke up in the middle of the night to turn to one side or the other her brain felt like it was turning. It was on and off and turning the head to the right or left because spinning for a short period of time such as a few seconds or minutes until she states still and relaxed, then the vertigo went away. Meclizine did help. She tried PT which helped. Pt states levothyroxine was started in Feb.    Lifestyle:  2 cups coffee daily  About 2 gallons water throughout the day  Exercise stair master    The following portions of the patient's history were reviewed and updated as appropriate:   She  has a past medical history of Abnormal Pap smear of cervix (1989), Asthma, Carbon monoxide exposure (02/06/2018), Chronic back pain, Hashimoto's thyroiditis (01/09/2023), Lump of skin, Migraine, Sinus bradycardia, and Varicella.   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 08/24/2021    Pelvic pain in female 07/06/2020    History of lumpectomy 07/06/2020    Skin rash 05/20/2020    Polyarthritis of multiple sites 04/17/2020    Phonophobia 02/14/2020    Vertiginous migraine 04/22/2019    Anxiety and depression 03/01/2019    Varicose veins of both lower extremities with pain 03/01/2019 Chronic migraine without aura without status migrainosus, not intractable 10/08/2018    Vertigo 02/06/2018    Essential hypertension 10/10/2016    Hypercholesterolemia 10/22/2014     She  has a past surgical history that includes Appendectomy; Tonsillectomy; [place] tooth extraction (Bilateral); Colonoscopy; Varicose vein surgery; and Breast cyst excision (Right, 2001). Her family history includes Arthritis in her family; Colon cancer (age of onset: 48) in her father; Diabetes in her maternal aunt; Heart attack in her father; Heart disease in her sister; Heart failure in her mother; Hypertension in her brother; Lung cancer in her maternal grandfather; No Known Problems in her maternal grandmother, paternal aunt, paternal aunt, paternal aunt, paternal grandfather, paternal grandmother, sister, son, son, and son; Stroke in her brother and maternal aunt. She  reports that she quit smoking about 25 years ago. Her smoking use included cigarettes. She has never used smokeless tobacco. She reports current alcohol use of about 7.0 standard drinks of alcohol per week. She reports that she does not use drugs. Current Outpatient Medications   Medication Sig Dispense Refill    aspirin 81 mg chewable tablet Chew 81 mg daily      Botox 200 units SOLR INJECT 200 UNITS IN THE MUSCLE OF VARIOUS SITES OF THE HEAD AND NECK ONCE EVERY 3 MONTHS 1 each 0    dexamethasone (DECADRON) 2 mg tablet 1 tab qam with food prn migraine. 5 tablet 0    fluticasone (FLONASE) 50 mcg/act nasal spray 2 sprays into each nostril daily 16 mL 1    ketorolac (TORADOL) 10 mg tablet TAKE 1 TABLET EVERY 6 HOURS AS NEEDED FOR MIGRAINE (WITH COMPAZINE IF NEEDED). MAX 2/DAY & 3/WEEK.  30 tablet 0    levothyroxine (Euthyrox) 50 mcg tablet Take 1 tablet (50 mcg total) by mouth daily 30 tablet 11    Magnesium 200 MG TABS Take 200 mg by mouth 2 (two) times a day      meclizine (ANTIVERT) 12.5 MG tablet 1-2 tabs TID prn dizziness 60 tablet 2    MULTIPLE VITAMINS ESSENTIAL PO Take by mouth      prochlorperazine (COMPAZINE) 10 mg tablet Take 1 tablet (10 mg total) by mouth every 8 (eight) hours as needed for nausea or vomiting 30 tablet 3    Progesterone 100 MG CAPS Take 100 mg by mouth at bedtime 30 capsule 11    colchicine (COLCRYS) 0.6 mg tablet Take 1 tablet (0.6 mg total) by mouth daily 90 tablet 3    losartan (COZAAR) 25 mg tablet Take 1 tablet (25 mg total) by mouth daily 90 tablet 3    other medication, see sig, Medication/product name: estradiol 1 mg/testosterone 12 mg /ml cream  Strength: as above  Sig (include dose, route, frequency): apply 0.5 ml to labia daily 15 mg 0    rimegepant sulfate (NURTEC) 75 mg TBDP 1 tab q other day. No more than one dose per 24 hours. 16 tablet 11     No current facility-administered medications for this visit. She is allergic to penicillins, doxycycline, erythromycin, and other. .         Objective:    Blood pressure 141/75, pulse 61, temperature 98.6 °F (37 °C), temperature source Temporal, height 5' 5" (1.651 m), weight 83.5 kg (184 lb), not currently breastfeeding. Body mass index is 30.62 kg/m². Physical Exam    Neurological Exam  On neurologic exam, the patient is alert and oriented to time and place. Speech is fluent and articulate, and the patient follows commands appropriately. Judgment and affect appear normal. Pupils are equally round and reactive to light and extraocular muscles are intact without nystagmus. Face is symmetric, and tongue, uvula, and palate are midline. Hearing is intact. Motor examination reveals intact strength throughout. Normal gait is steady. ROS:    Review of Systems   Constitutional:  Positive for fatigue. Negative for appetite change and fever. HENT: Negative. Negative for hearing loss, tinnitus, trouble swallowing and voice change. Eyes: Negative. Negative for photophobia, pain and visual disturbance. Respiratory: Negative. Negative for shortness of breath.     Cardiovascular: Negative. Negative for palpitations. Gastrointestinal: Negative. Negative for nausea and vomiting. Endocrine: Negative. Negative for cold intolerance. Genitourinary: Negative. Negative for dysuria, frequency and urgency. Musculoskeletal:  Negative for back pain, gait problem, myalgias and neck pain. Skin: Negative. Negative for rash. Allergic/Immunologic: Negative. Neurological:  Positive for dizziness, light-headedness and headaches. Negative for tremors, seizures, syncope, facial asymmetry, speech difficulty, weakness and numbness. Hematological: Negative. Does not bruise/bleed easily. Psychiatric/Behavioral: Negative. Negative for confusion, hallucinations and sleep disturbance. ROS reviewed.

## 2023-09-19 ENCOUNTER — TELEPHONE (OUTPATIENT)
Dept: NEUROLOGY | Facility: CLINIC | Age: 58
End: 2023-09-19

## 2023-09-19 NOTE — TELEPHONE ENCOUNTER
Received fax from [facility]. Emgality requires PA  Key [id number]    PA initiated on [facility]    Awaiting determination

## 2023-09-22 ENCOUNTER — TELEPHONE (OUTPATIENT)
Dept: NEUROLOGY | Facility: CLINIC | Age: 58
End: 2023-09-22

## 2023-09-22 NOTE — TELEPHONE ENCOUNTER
Received fax from [facility]. [facility] bari KOROMA. Key: [id number]    PA initiated on [facility]  Additional Information Required  A PA is already in process for this member/drug.

## 2023-09-22 NOTE — TELEPHONE ENCOUNTER
emgality approved from 8/20/23-3/17/24    Left message for pharm making them aware of the approval and that pt will need 2 pens for loading dose

## 2023-09-26 ENCOUNTER — TELEPHONE (OUTPATIENT)
Dept: NEUROLOGY | Facility: CLINIC | Age: 58
End: 2023-09-26

## 2023-09-26 NOTE — TELEPHONE ENCOUNTER
Submitted and Received the following Authorization Approval info via [address] [facility]: through [facility]:    Approved: Under Pharmacy Benefits. Botox-200 units. QTY:1, Q3 Months. [id number] for CPT: 19083. Auth/PA Case-ID: [id number]  Valid: 8/27/2023 until 9/25/2024  4 visits    Please use [facility].

## 2023-09-28 NOTE — TELEPHONE ENCOUNTER
[address] [facility] and spoke with Meenu Seals regarding the delivery status of patients Botox medication. Meenu Seals informed me that patients Botox is ready to be scheduled but patient does have a Co-Pay amount of $185.00 owed. Meenu Seals stated patient does have a credit card on file for patient to be charged so delivery can be scheduled. Meenu Seals did not charge patients card at this time per my request so I can try reaching out to patient to confirm that she would like to pay the $185.00 owed in order to have her Botox delivered prior to 10/04/2023 [employer] [facility] Called patient and attempted to leave VM message regarding the updated status on her Botox order w/ [facility] but patients Voice-Mailbox was full and could take any messages at this time.      (Sent patient MyChart Message w/ updated delivery status for Botox order)

## 2023-09-28 NOTE — TELEPHONE ENCOUNTER
[address] and scheduled delivery with Shahla Juarez for:     Botox-200 units  Qty:1  Delivery to [facility]  Scheduled for 9/29/2023  Via: [employer]     Please advise if medication doesn't arrive.

## 2023-09-29 NOTE — TELEPHONE ENCOUNTER
Botox number of units: 200 units  Botox quantity: 1  Arrived at what location: [facility]  Botox at Correct Administering Location: Yes  [address] number: [id number]  Lot number: [id number]  Expiration Date:03/29/26  Appt notes indicate correct medication: Yes

## 2023-10-04 ENCOUNTER — PROCEDURE VISIT (OUTPATIENT)
Dept: NEUROLOGY | Facility: CLINIC | Age: 58
End: 2023-10-04
Payer: COMMERCIAL

## 2023-10-04 VITALS
WEIGHT: 184 LBS | SYSTOLIC BLOOD PRESSURE: 185 MMHG | BODY MASS INDEX: 30.66 KG/M2 | HEART RATE: 61 BPM | HEIGHT: 65 IN | TEMPERATURE: 98.4 F | DIASTOLIC BLOOD PRESSURE: 81 MMHG

## 2023-10-04 DIAGNOSIS — G43.709 CHRONIC MIGRAINE WITHOUT AURA WITHOUT STATUS MIGRAINOSUS, NOT INTRACTABLE: Primary | ICD-10-CM

## 2023-10-04 PROCEDURE — 64615 CHEMODENERV MUSC MIGRAINE: CPT | Performed by: PHYSICIAN ASSISTANT

## 2023-10-04 NOTE — PROGRESS NOTES
Universal Protocol   Consent: Verbal consent obtained. Written consent obtained.   Risks and benefits: risks, benefits and alternatives were discussed  Consent given by: patient  Patient understanding: patient states understanding of the procedure being performed  Patient consent: the patient's understanding of the procedure matches consent given  Procedure consent: procedure consent matches procedure scheduled        Chemodenervation     Date/Time 10/4/2023 1:30 PM     Performed by  Jr Arango PA-C   Authorized by Jr Arango PA-C       Pre-procedure details      Prepped With: Alcohol     Procedure details     Position:  Upright   Botox     Botox Type:  Type A    Brand:  Botox    mL's of Botulinum Toxin:  200    Final Concentration per CC:  100 units    Needle Gauge:  30 G 2.5 inch   Procedures     Botox Procedures: chronic headache      Indications: migraines     Injection Location      Head / Face:  L superior trapezius, R superior trapezius, L superior cervical paraspinal, R superior cervical paraspinal, L , R , procerus, L temporalis, R temporalis, R frontalis, L frontalis, R medial occipitalis and L medial occipitalis    L  injection amount:  0 unit(s)    R  injection amount:  0 unit(s)    L lateral frontalis:  5 unit(s)    R lateral frontalis:  5 unit(s)    Comments:  Close to hairline    L medial frontalis:  0 unit(s)    R medial frontalis:  0 unit(s)    L temporalis injection amount:  20 unit(s)    R temporalis injection amount:  20 unit(s)    Procerus injection amount:  0 unit(s)    L medial occipitalis injection amount:  15 unit(s)    R medial occipitalis injection amount:  15 unit(s)    L superior cervical paraspinal injection amount:  10 unit(s)    R superior cervical paraspinal injection amount:  10 unit(s)    L superior trapezius injection amount:  15 unit(s)    R superior trapezius injection amount:  15 unit(s)   Total Units     Total units used: 200    Total units discarded:  0   Post-procedure details      Chemodenervation:  Chronic migraine    Facial Nerve Location[de-identified]  Bilateral facial nerve    Patient tolerance of procedure: Tolerated well, no immediate complications   Comments      Extra units medically necessary:  - 10 between both anterior temporalis muscles  - 10 between both orbicularis oculi  - 30 between both upper traps  - 20 scalp t/o    Avoided some frontalis, corrugators and procerus. Pt is seeing ophthalmologist for possible correction of b/l mild ptosis, and botox does make slightly worse. Blood pressure (!) 185/81, pulse 61, temperature 98.4 °F (36.9 °C), temperature source Temporal, height 5' 5" (1.651 m), weight 83.5 kg (184 lb), not currently breastfeeding. Pt to take BP at home and if elevated still she will contact me. Wait for [employer] PA to be complete. Pt's copay for emgality very high, so I recommended to try copay card.

## 2023-10-04 NOTE — PROGRESS NOTES
Universal Protocol   Consent: Verbal consent obtained. Written consent obtained.   Risks and benefits: risks, benefits and alternatives were discussed  Consent given by: patient  Patient understanding: patient states understanding of the procedure being performed  Patient consent: the patient's understanding of the procedure matches consent given  Procedure consent: procedure consent matches procedure scheduled        Chemodenervation     Date/Time 10/4/2023 1:30 PM     Performed by  Yonathan Valenzuela PA-C   Authorized by Yonathan Valenzuela PA-C       Pre-procedure details      Prepped With: Alcohol     Procedure details     Position:  Upright   Botox     Botox Type:  Type A    Brand:  Botox    mL's of Botulinum Toxin:  155    Final Concentration per CC:  100 units    Needle Gauge:  30 G 2.5 inch   Procedures     Botox Procedures: chronic headache      Indications: migraines     Injection Location      Head / Face:  L superior trapezius, R superior trapezius, L superior cervical paraspinal, R superior cervical paraspinal, L , R , procerus, L temporalis, R temporalis, R frontalis, L frontalis, R medial occipitalis and L medial occipitalis    L  injection amount:  0 unit(s)    R  injection amount:  0 unit(s)    L lateral frontalis:  5 unit(s)    R lateral frontalis:  5 unit(s)    L medial frontalis:  0 unit(s)    R medial frontalis:  0 unit(s)    L temporalis injection amount:  20 unit(s)    R temporalis injection amount:  20 unit(s)    Procerus injection amount:  0 unit(s)    L medial occipitalis injection amount:  15 unit(s)    R medial occipitalis injection amount:  15 unit(s)    L superior cervical paraspinal injection amount:  10 unit(s)    R superior cervical paraspinal injection amount:  10 unit(s)    L superior trapezius injection amount:  15 unit(s)    R superior trapezius injection amount:  15 unit(s)   Total Units     Total units used:  155    Total units discarded:  45 Post-procedure details      Chemodenervation:  Chronic migraine    Facial Nerve Location[de-identified]  Bilateral facial nerve    Patient tolerance of procedure: Tolerated well, no immediate complications   Comments      Extra units medically necessary:  - 20 between both temporalis muscles  - 15 between both upper traps       Blood pressure (!) 185/81, pulse 61, temperature 98.4 °F (36.9 °C), temperature source Temporal, height 5' 5" (1.651 m), weight 83.5 kg (184 lb), not currently breastfeeding. [facility] for [facility] to be complete.

## 2023-10-17 DIAGNOSIS — R68.82 DECREASED LIBIDO: ICD-10-CM

## 2023-10-19 ENCOUNTER — TELEPHONE (OUTPATIENT)
Dept: OBGYN CLINIC | Facility: CLINIC | Age: 58
End: 2023-10-19

## 2023-10-19 NOTE — TELEPHONE ENCOUNTER
----- Message from Kaila Mejia sent at 10/19/2023 11:08 AM EDT -----  Regarding: Medication Substitution Request  Hello,  A patient of Dr. Andres Corado has a medication replacement request that needs her approval. She will not be back in the office until Tuesday, and was instructed to forward it to your clinical basket. The request can be found under the patient's .      Thank you so much,  Lian Walton

## 2023-10-20 ENCOUNTER — OFFICE VISIT (OUTPATIENT)
Dept: CARDIOLOGY CLINIC | Facility: MEDICAL CENTER | Age: 58
End: 2023-10-20
Payer: COMMERCIAL

## 2023-10-20 VITALS
BODY MASS INDEX: 30.32 KG/M2 | OXYGEN SATURATION: 98 % | HEIGHT: 65 IN | HEART RATE: 73 BPM | WEIGHT: 182 LBS | SYSTOLIC BLOOD PRESSURE: 170 MMHG | DIASTOLIC BLOOD PRESSURE: 90 MMHG

## 2023-10-20 DIAGNOSIS — E78.00 HYPERCHOLESTEROLEMIA: ICD-10-CM

## 2023-10-20 DIAGNOSIS — I31.9 CHRONIC IDIOPATHIC PERICARDITIS, UNSPECIFIED COMPLICATION STATUS: ICD-10-CM

## 2023-10-20 DIAGNOSIS — I49.1 PAC (PREMATURE ATRIAL CONTRACTION): ICD-10-CM

## 2023-10-20 DIAGNOSIS — I10 ESSENTIAL HYPERTENSION: Primary | ICD-10-CM

## 2023-10-20 PROCEDURE — 99214 OFFICE O/P EST MOD 30 MIN: CPT | Performed by: INTERNAL MEDICINE

## 2023-10-20 RX ORDER — LOSARTAN POTASSIUM 25 MG/1
25 TABLET ORAL DAILY
Qty: 90 TABLET | Refills: 3 | Status: SHIPPED | OUTPATIENT
Start: 2023-10-20

## 2023-10-20 RX ORDER — COLCHICINE 0.6 MG/1
0.6 TABLET ORAL DAILY
Qty: 90 TABLET | Refills: 3 | Status: SHIPPED | OUTPATIENT
Start: 2023-10-20

## 2023-10-20 NOTE — PATIENT INSTRUCTIONS
Recommendations:  1. Restart colchicine 0.6mg daily. 2. Start losartan 25mg daily. 3. Continue remainder of medications. 4. Follow up in 4 months.

## 2023-10-20 NOTE — PROGRESS NOTES
Cardiology   Robel Cruz 62 y.o. female MRN: [id number]        Impression:  1. Hypertension - not adequate control. Recently started estrogen. 2. Dyslipidemia - High LDL and HDL. 3. Palpitations - Occasional palpitations daily. Benign. Not distressing patient. 4. Chest tightness - likely pericarditis. Recurrent. Recommendations:  1. Restart colchicine 0.6mg daily. 2. Start losartan 25mg daily. 3. Continue remainder of medications. 4. Follow up in 4 months. HPI: Robel Cruz is a 62y.o. year old female with hypertension, dyslipidemia, premature atrial contractions, and migraines, who presents for follow up. Saw Dr. Talha Zuluaga 6 yrs ago for palpitations - holter monitor demonstrated just premature atrial contractions. Echo demonstrated normal cardiac function with mild mitral regurgitation, and holter demonstrated no dysrhythmias. Started on oral estrogen with increase in BP. Also with chest pain when lying flat. Recently diagnosed with Hashimoto's thyroiditis. Review of Systems   Constitutional: Negative. HENT: Negative. Eyes: Negative. Respiratory:  Negative for chest tightness and shortness of breath. Cardiovascular:  Negative for chest pain, palpitations and leg swelling. Gastrointestinal: Negative. Endocrine: Negative. Genitourinary: Negative. Musculoskeletal: Negative. Skin: Negative. Allergic/Immunologic: Negative. Neurological:  Positive for headaches. Hematological: Negative. Psychiatric/Behavioral: Negative. All other systems reviewed and are negative.         Past Medical History:   Diagnosis Date    Abnormal Pap smear of cervix 1989    all normal since    Asthma     Carbon monoxide exposure 02/06/2018    Chronic back pain     Hashimoto's thyroiditis 01/09/2023    Lump of skin     last assessed 11/21/13    Migraine     Sinus bradycardia     last assessed 10/25/16    Varicella      Past Surgical History:   Procedure Laterality Date    APPENDECTOMY      BREAST CYST EXCISION Right 2001    benign    COLONOSCOPY      TONSILLECTOMY      VARICOSE VEIN SURGERY      WISDOM TOOTH EXTRACTION Bilateral      Social History     Substance and Sexual Activity   Alcohol Use Yes    Alcohol/week: 7.0 standard drinks of alcohol    Types: 7 Glasses of wine per week    Comment: social per [employer]     Social History     Substance and Sexual Activity   Drug Use No     Social History     Tobacco Use   Smoking Status Former    Types: Cigarettes    Quit date:     Years since quittin.8   Smokeless Tobacco Never     Family History   Problem Relation Age of Onset    Heart failure Mother         CHF    Heart attack Father     Colon cancer Father 48    Heart disease Sister     No Known Problems Sister     Hypertension Brother     Stroke Brother     No Known Problems Maternal Grandmother     Lung cancer Maternal Grandfather     No Known Problems Paternal Grandmother     No Known Problems Paternal Grandfather     No Known Problems Son     No Known Problems Son     No Known Problems Son     Diabetes Maternal Aunt     Stroke Maternal Aunt     No Known Problems Paternal Aunt     No Known Problems Paternal Aunt     No Known Problems Paternal Aunt     Arthritis Family     Breast cancer Neg Hx        Allergies: Allergies   Allergen Reactions    Penicillins     Doxycycline     Erythromycin     Other      Mushrooms       Medications:     Current Outpatient Medications:     aspirin 81 mg chewable tablet, Chew 81 mg daily, Disp: , Rfl:     Botox 200 units SOLR, INJECT 200 UNITS IN THE MUSCLE OF VARIOUS SITES OF THE HEAD AND NECK ONCE EVERY 3 MONTHS, Disp: 1 each, Rfl: 0    dexamethasone (DECADRON) 2 mg tablet, 1 tab qam with food prn migraine. , Disp: 5 tablet, Rfl: 0    fluticasone (FLONASE) 50 mcg/act nasal spray, 2 sprays into each nostril daily, Disp: 16 mL, Rfl: 1    Galcanezumab-gnlm (Emgality) 120 MG/ML SOAJ, 2 injections subcu for the first month, and then one injection every month thereafter., Disp: 3 mL, Rfl: 3    ketorolac (TORADOL) 10 mg tablet, TAKE 1 TABLET EVERY 6 HOURS AS NEEDED FOR MIGRAINE (WITH COMPAZINE IF NEEDED). MAX 2/DAY & 3/WEEK., Disp: 30 tablet, Rfl: 0    levothyroxine (Euthyrox) 50 mcg tablet, Take 1 tablet (50 mcg total) by mouth daily, Disp: 30 tablet, Rfl: 11    Magnesium 200 MG TABS, Take 200 mg by mouth 2 (two) times a day, Disp: , Rfl:     meclizine (ANTIVERT) 12.5 MG tablet, 1-2 tabs TID prn dizziness, Disp: 60 tablet, Rfl: 2    MULTIPLE VITAMINS ESSENTIAL PO, Take by mouth, Disp: , Rfl:     other medication, see sig,, Medication/product name: testosterone cream Strength: 4mg/ml  Sig (include dose, route, frequency): apply 0.5 ml to labia daily, Disp: 15 mg, Rfl: 0    prochlorperazine (COMPAZINE) 10 mg tablet, Take 1 tablet (10 mg total) by mouth every 8 (eight) hours as needed for nausea or vomiting, Disp: 30 tablet, Rfl: 3    Progesterone 100 MG CAPS, Take 100 mg by mouth at bedtime, Disp: 30 capsule, Rfl: 11    rimegepant sulfate (NURTEC) 75 mg TBDP, 1 tab q other day. No more than one dose per 24 hours. , Disp: 16 tablet, Rfl: 2    estradiol (CLIMARA) 0.05 mg/24 hr, , Disp: , Rfl:     estradiol (Estrace) 1 mg tablet, Take 1 tablet (1 mg total) by mouth daily (Patient not taking: Reported on 10/20/2023), Disp: 30 tablet, Rfl: 11    GaviLyte-G 236 g solution, AS DIRECTED **MAY SUBSTITUTE TRILYTE, NULYTE, OR ANY PEG PRODUCT (Patient not taking: Reported on 9/13/2023), Disp: , Rfl:       Wt Readings from Last 3 Encounters:   10/20/23 82.6 kg (182 lb)   10/04/23 83.5 kg (184 lb)   09/13/23 83.5 kg (184 lb)     Temp Readings from Last 3 Encounters:   10/04/23 98.4 °F (36.9 °C) (Temporal)   09/13/23 98.6 °F (37 °C) (Temporal)   07/05/23 97.5 °F (36.4 °C) (Temporal)     BP Readings from Last 3 Encounters:   10/20/23 170/90   10/04/23 (!) 185/81   09/13/23 141/75     Pulse Readings from Last 3 Encounters:   10/20/23 73   10/04/23 61 09/13/23 61         Physical Exam  HENT:      Head: Atraumatic. Mouth/Throat:      Mouth: Mucous membranes are moist.   Eyes:      Extraocular Movements: Extraocular movements intact. Cardiovascular:      Rate and Rhythm: Normal rate and regular rhythm. Heart sounds: Normal heart sounds. Pulmonary:      Effort: Pulmonary effort is normal.      Breath sounds: Normal breath sounds. Abdominal:      General: Abdomen is flat. Musculoskeletal:         General: Normal range of motion. Cervical back: Normal range of motion. Skin:     General: Skin is warm. Neurological:      General: No focal deficit present. Mental Status: She is alert and oriented to person, place, and time.    Psychiatric:         Mood and Affect: Mood normal.         Behavior: Behavior normal.           Laboratory Studies:  CMP:  Lab Results   Component Value Date     12/18/2015    K 4.6 02/12/2022     02/12/2022    CO2 30 02/12/2022    ANIONGAP 10 12/18/2015    BUN 14 02/12/2022    CREATININE 0.83 02/12/2022    GLUCOSE 92 12/18/2015    AST 19 03/11/2021    ALT 31 03/11/2021    BILITOT 0.64 12/18/2015    EGFR 79 02/12/2022       Lipid Profile:   No results found for: "CHOL"  Lab Results   Component Value Date    HDL 85 02/12/2022     Lab Results   Component Value Date    LDLCALC 154 (H) 02/12/2022     Lab Results   Component Value Date    TRIG 89 02/12/2022

## 2023-10-23 ENCOUNTER — TELEPHONE (OUTPATIENT)
Dept: OBGYN CLINIC | Facility: CLINIC | Age: 58
End: 2023-10-23

## 2023-10-23 NOTE — TELEPHONE ENCOUNTER
----- Message from Jun Andujar sent at 10/21/2023  4:09 PM EDT -----  Regarding: medication  Contact: [phone]  [address] [place] [address]    Or do you mean pharmacy?  [facility] [address] in [place]

## 2023-10-26 ENCOUNTER — TELEMEDICINE (OUTPATIENT)
Dept: OBGYN CLINIC | Facility: CLINIC | Age: 58
End: 2023-10-26
Payer: COMMERCIAL

## 2023-10-26 DIAGNOSIS — E03.9 HYPOTHYROIDISM, UNSPECIFIED TYPE: ICD-10-CM

## 2023-10-26 DIAGNOSIS — N95.1 MENOPAUSAL SYMPTOMS: Primary | ICD-10-CM

## 2023-10-26 DIAGNOSIS — R68.82 DECREASED LIBIDO: ICD-10-CM

## 2023-10-26 PROCEDURE — 99215 OFFICE O/P EST HI 40 MIN: CPT | Performed by: OBSTETRICS & GYNECOLOGY

## 2023-11-02 PROBLEM — Z77.29 CARBON MONOXIDE EXPOSURE: Status: RESOLVED | Noted: 2023-09-13 | Resolved: 2023-11-02

## 2023-11-02 PROBLEM — F33.9 DEPRESSION, RECURRENT (HCC): Status: RESOLVED | Noted: 2021-08-23 | Resolved: 2023-11-02

## 2023-11-02 NOTE — PROGRESS NOTES
Virtual Regular Visit    Verification of patient location:    Patient is located at Home in the following state in which I hold an active license [place]      Assessment/Plan:    Problem List Items Addressed This Visit    None  Visit Diagnoses       Menopausal symptoms    -  Primary    Relevant Orders    Estradiol    Progesterone    Decreased libido        Relevant Medications    other medication, see sig,    Hypothyroidism, unspecified type        Relevant Orders    TSH, 3rd generation    T4, free          1) Recent labs reviewed, noting: a) Thyroid axis: normal TSH 3.3/ FT3 2.9, although this could be improved; b) Adrenal: DHEAS still low at 39 ( optimal 100-200); testosterone 27, cortisol 26.1  2) There certainly is room for improvement in testosterone dosage. I recommend:  A) Consolidate estradiol 1 mg with increased testosterone to 12 mg/ml, 0.5 ml daily ( Total daily:E2 0.5, testosterone 6 mg daily). Continue PG 100mg nightly  B) Resume DHEA 15 mg and increase to BID use until new testosterone cream issued (in a couple weeks). C) Leave thyroid dosage same for now, consider increase if further labs merit. D) Repeat labs in 3-4 months, will place order now. E) Follow up prn lab results. This was a 40 minute visit with greater than 50% of time spent in face to face counseling and coordination of care       Reason for visit is   Chief Complaint   Patient presents with    Virtual Regular Visit          Encounter provider Mary Lou Vega MD    Provider located at [facility]  [phone]      Recent Visits  Date Type Provider Dept   10/26/23 Telemedicine Mary Lou Vega  [address]   Showing recent visits within past 7 days and meeting all other requirements  Future Appointments  No visits were found meeting these conditions.   Showing future appointments within next 150 days and meeting all other requirements       The patient was identified by name and date of birth. [facility] José Manuel was informed that this is a telemedicine visit and that the visit is being conducted through the [employer]. She agrees to proceed. .  My office door was closed. No one else was in the room. She acknowledged consent and understanding of privacy and security of the video platform. The patient has agreed to participate and understands they can discontinue the visit at any time. Patient is aware this is a billable service. Ajit Bailey returns for hormone consult follow up. I saw her several months ago with complaints of decreased libido, weight gain, chronic fatigue. She sought care from another provider who started her on Biest cream, progesterone capsules, T3/T4 , DHEA/pregnenolone cream. I offered to simply her regimen to FDA forms when able. Now on:  1)Progesterone 100 mg po  2) Levothyroxine 50 mcg daily  3) Estradiol 1 mg ( previously on Climara patch 0.05)  4) Testosterone cream 2mg/ml  Still complained of decreased libido, so BID use of testosterone cream offered ( total 4 mg daily)  BP increased with oral E2 so had to stop, now on losartan. She had some old Biest cream left over ( Biest 80:20 0.25 g daily) which is not enough. She had weight loss earlier but her progress has plateaued. Hormonal labs repeated last month which we will review today.          Past Medical History:   Diagnosis Date    Abnormal Pap smear of cervix 1989    all normal since    Asthma     Carbon monoxide exposure 02/06/2018    Chronic back pain     Hashimoto's thyroiditis 01/09/2023    Lump of skin     last assessed 11/21/13    Migraine     Sinus bradycardia     last assessed 10/25/16    Varicella        Past Surgical History:   Procedure Laterality Date    APPENDECTOMY      BREAST CYST EXCISION Right 2001    benign    COLONOSCOPY      TONSILLECTOMY      VARICOSE VEIN SURGERY      WISDOM TOOTH EXTRACTION Bilateral        Current Outpatient Medications   Medication Sig Dispense Refill    other medication, see sig, Medication/product name: estradiol 1 mg/testosterone 12 mg /ml cream  Strength: as above  Sig (include dose, route, frequency): apply 0.5 ml to labia daily 15 mg 0    aspirin 81 mg chewable tablet Chew 81 mg daily      Botox 200 units SOLR INJECT 200 UNITS IN THE MUSCLE OF VARIOUS SITES OF THE HEAD AND NECK ONCE EVERY 3 MONTHS 1 each 0    colchicine (COLCRYS) 0.6 mg tablet Take 1 tablet (0.6 mg total) by mouth daily 90 tablet 3    dexamethasone (DECADRON) 2 mg tablet 1 tab qam with food prn migraine. 5 tablet 0    fluticasone (FLONASE) 50 mcg/act nasal spray 2 sprays into each nostril daily 16 mL 1    ketorolac (TORADOL) 10 mg tablet TAKE 1 TABLET EVERY 6 HOURS AS NEEDED FOR MIGRAINE (WITH COMPAZINE IF NEEDED). MAX 2/DAY & 3/WEEK. 30 tablet 0    levothyroxine (Euthyrox) 50 mcg tablet Take 1 tablet (50 mcg total) by mouth daily 30 tablet 11    losartan (COZAAR) 25 mg tablet Take 1 tablet (25 mg total) by mouth daily 90 tablet 3    Magnesium 200 MG TABS Take 200 mg by mouth 2 (two) times a day      meclizine (ANTIVERT) 12.5 MG tablet 1-2 tabs TID prn dizziness 60 tablet 2    MULTIPLE VITAMINS ESSENTIAL PO Take by mouth      prochlorperazine (COMPAZINE) 10 mg tablet Take 1 tablet (10 mg total) by mouth every 8 (eight) hours as needed for nausea or vomiting 30 tablet 3    Progesterone 100 MG CAPS Take 100 mg by mouth at bedtime 30 capsule 11    rimegepant sulfate (NURTEC) 75 mg TBDP 1 tab q other day. No more than one dose per 24 hours. 16 tablet 2     No current facility-administered medications for this visit. Allergies   Allergen Reactions    Penicillins     Doxycycline     Erythromycin     Other      Mushrooms       Review of Systems   Constitutional:  Positive for fatigue and unexpected weight change. Cardiovascular: Negative. Gastrointestinal: Negative. Endocrine: Negative for heat intolerance. Genitourinary:         Decreased libido   Musculoskeletal: Negative. Skin: Negative. Neurological: Negative. Psychiatric/Behavioral:  Positive for dysphoric mood and sleep disturbance. Video Exam    There were no vitals filed for this visit.     Physical Exam

## 2024-01-10 DIAGNOSIS — R68.82 DECREASED LIBIDO: ICD-10-CM

## 2024-02-19 DIAGNOSIS — E06.3 HYPOTHYROIDISM DUE TO HASHIMOTO'S THYROIDITIS: ICD-10-CM

## 2024-02-19 DIAGNOSIS — N95.1 MENOPAUSAL SYMPTOMS: ICD-10-CM

## 2024-02-19 DIAGNOSIS — E03.8 HYPOTHYROIDISM DUE TO HASHIMOTO'S THYROIDITIS: ICD-10-CM

## 2024-02-19 RX ORDER — PROGESTERONE 100 MG/1
100 CAPSULE ORAL
Qty: 30 CAPSULE | Refills: 11 | Status: SHIPPED | OUTPATIENT
Start: 2024-02-19

## 2024-02-19 RX ORDER — LEVOTHYROXINE SODIUM 0.05 MG/1
50 TABLET ORAL DAILY
Qty: 30 TABLET | Refills: 11 | Status: SHIPPED | OUTPATIENT
Start: 2024-02-19

## 2024-03-11 ENCOUNTER — OFFICE VISIT (OUTPATIENT)
Dept: OBGYN CLINIC | Facility: CLINIC | Age: 59
End: 2024-03-11
Payer: COMMERCIAL

## 2024-03-11 ENCOUNTER — HOSPITAL ENCOUNTER (OUTPATIENT)
Dept: RADIOLOGY | Facility: HOSPITAL | Age: 59
Discharge: HOME | End: 2024-03-11
Attending: STUDENT IN AN ORGANIZED HEALTH CARE EDUCATION/TRAINING PROGRAM
Payer: COMMERCIAL

## 2024-03-11 VITALS — BODY MASS INDEX: 30.32 KG/M2 | WEIGHT: 182 LBS | HEIGHT: 65 IN

## 2024-03-11 DIAGNOSIS — M79.641 RIGHT HAND PAIN: ICD-10-CM

## 2024-03-11 DIAGNOSIS — M19.041 ARTHRITIS OF FINGER OF RIGHT HAND: ICD-10-CM

## 2024-03-11 DIAGNOSIS — M18.11 ARTHRITIS OF CARPOMETACARPAL (CMC) JOINT OF RIGHT THUMB: Primary | ICD-10-CM

## 2024-03-11 PROCEDURE — 73130 X-RAY EXAM OF HAND: CPT

## 2024-03-11 PROCEDURE — 20600 DRAIN/INJ JOINT/BURSA W/O US: CPT | Performed by: STUDENT IN AN ORGANIZED HEALTH CARE EDUCATION/TRAINING PROGRAM

## 2024-03-11 PROCEDURE — 99214 OFFICE O/P EST MOD 30 MIN: CPT | Performed by: STUDENT IN AN ORGANIZED HEALTH CARE EDUCATION/TRAINING PROGRAM

## 2024-03-11 RX ORDER — BUPIVACAINE HYDROCHLORIDE 2.5 MG/ML
1 INJECTION, SOLUTION EPIDURAL; INFILTRATION; INTRACAUDAL
Status: COMPLETED | OUTPATIENT
Start: 2024-03-11 | End: 2024-03-11

## 2024-03-11 RX ORDER — BETAMETHASONE SODIUM PHOSPHATE AND BETAMETHASONE ACETATE 3; 3 MG/ML; MG/ML
6 INJECTION, SUSPENSION INTRA-ARTICULAR; INTRALESIONAL; INTRAMUSCULAR; SOFT TISSUE
Status: COMPLETED | OUTPATIENT
Start: 2024-03-11 | End: 2024-03-11

## 2024-03-11 RX ADMIN — BETAMETHASONE SODIUM PHOSPHATE AND BETAMETHASONE ACETATE 6 MG: 3; 3 INJECTION, SUSPENSION INTRA-ARTICULAR; INTRALESIONAL; INTRAMUSCULAR; SOFT TISSUE at 15:30

## 2024-03-11 RX ADMIN — BUPIVACAINE HYDROCHLORIDE 1 ML: 2.5 INJECTION, SOLUTION EPIDURAL; INFILTRATION; INTRACAUDAL at 15:30

## 2024-03-11 NOTE — PROGRESS NOTES
ORTHOPAEDIC HAND, WRIST, AND ELBOW OFFICE  VISIT      ASSESSMENT/PLAN:      Diagnoses and all orders for this visit:    Arthritis of carpometacarpal (CMC) joint of right thumb  -     Ambulatory Referral to PT/OT Hand Therapy; Future  -     Small joint arthrocentesis: R thumb CMC    Right hand pain  -     XR hand 3+ vw right; Future    Arthritis of finger of right hand  -     Ambulatory Referral to PT/OT Hand Therapy; Future  -     Small joint arthrocentesis: R long PIP              58 y.o. female with right thumb CMC OA and right long PIP OA  Treatment options and expected outcomes were discussed.  X-rays were discussed in the office today which demonstrate right thumb CMC arthritis and right long finger PIP arthritis.  Treatment options were discussed in the form of bracing, formal therapy, and injections.   The patient verbalized understanding of exam findings and treatment plan.   The patient was given the opportunity to ask questions.  Questions were answered to the patient's satisfaction.  The patient decided to move forward with bracing, formal therapy, and steroid injections.  The patient was advised to get a comfort cool brace she can use as needed for pain.  A referral was provided to formal therapy.  The patient consented and underwent a right thumb CMC injection and right long finger PIP injection in the office today without any complications.   She may follow up in 3 months or as needed.      Follow Up:  3 months       To Do Next Visit:  Re-evaluation of current issue      Discussions:  Thumb CMC Arthritis: The anatomy and physiology of carpometacarpal joint arthritis was discussed with the patient today in the office.  Deterioration of the articular cartilage eventually leads to hypermobility at the thumb CMC joint, resulting in joint subluxation, osteophyte formation, cystic changes within the trapezium and base of the first metacarpal, as well as subchondral sclerosis.  Eventually, pain, limited  mobility, and compensatory hyperextension at the metacarpophalangeal joint may develop.  While normal activity and usage of the thumb joint may provide a painful experience to the patient, this typically does not result in damage to the thumb or hand.  Treatment options include resting thumb spica splints to decreased joint edema, pain, and inflammation.  Therapy exercises to strengthen the thenar musculature may relieve pain, but do not alter the overall continued development of osteoarthritis.  Oral medications, topical medications, corticosteroid injections may decrease pain and increase overall function.  Eventually, approximately 5% of patients may require surgical intervention.       David Penaloza MD  Attending, Orthopaedic Surgery  Hand, Wrist, and Elbow Surgery  [facility]    ______________________________________________________________________________________________    CHIEF COMPLAINT:  Chief Complaint   Patient presents with   • Right Hand - Pain       SUBJECTIVE:  Patient is a 58 y.o. RHD female who presents today for evaluation and treatment of right hand pain. The patient notes pain to the base of her right thumb. She states this has been ongoing for a few weeks and denies any injury or trauma. She also notes swelling and a grinding sensation to this area. She notes increased pain with , pinch, opening jars, and thumb movement. She has been using a thumb spica brace which does provide some relief.  The patient also notes pain to the right long PIP joint. She states this has been ongoing since a injury where her finger got caught in someone's mail box 3 x's. The patient has a history of right scaphoid cyst with bone graft performed by Dr. Leroy in 2014.     Occupation:       I have personally reviewed all the relevant PMH, PSH, SH, FH, Medications and allergies      PAST MEDICAL HISTORY:  Past Medical History:   Diagnosis Date   • Abnormal Pap smear of cervix      all normal since   • Asthma    • Carbon monoxide exposure 2018   • Chronic back pain    • Hashimoto's thyroiditis 2023   • Lump of skin     last assessed 13   • Migraine    • Sinus bradycardia     last assessed 10/25/16   • Varicella        PAST SURGICAL HISTORY:  Past Surgical History:   Procedure Laterality Date   • APPENDECTOMY     • BREAST CYST EXCISION Right     benign   • COLONOSCOPY     • TONSILLECTOMY     • VARICOSE VEIN SURGERY     • WISDOM TOOTH EXTRACTION Bilateral        FAMILY HISTORY:  Family History   Problem Relation Age of Onset   • Heart failure Mother         CHF   • Heart attack Father    • Colon cancer Father 50   • Heart disease Sister    • No Known Problems Sister    • Hypertension Brother    • Stroke Brother    • No Known Problems Maternal Grandmother    • Lung cancer Maternal Grandfather    • No Known Problems Paternal Grandmother    • No Known Problems Paternal Grandfather    • No Known Problems Son    • No Known Problems Son    • No Known Problems Son    • Diabetes Maternal Aunt    • Stroke Maternal Aunt    • No Known Problems Paternal Aunt    • No Known Problems Paternal Aunt    • No Known Problems Paternal Aunt    • Arthritis Family    • Breast cancer Neg Hx        SOCIAL HISTORY:  Social History     Tobacco Use   • Smoking status: Former     Current packs/day: 0.00     Types: Cigarettes     Quit date:      Years since quittin.2   • Smokeless tobacco: Never   Vaping Use   • Vaping status: Never Used   Substance Use Topics   • Alcohol use: Yes     Alcohol/week: 7.0 standard drinks of alcohol     Types: 7 Glasses of wine per week     Comment: social per [employer]   • Drug use: No       MEDICATIONS:    Current Outpatient Medications:   •  aspirin 81 mg chewable tablet, Chew 81 mg daily, Disp: , Rfl:   •  colchicine (COLCRYS) 0.6 mg tablet, Take 1 tablet (0.6 mg total) by mouth daily, Disp: 90 tablet, Rfl: 3  •  dexamethasone (DECADRON) 2 mg tablet, 1  "tab qam with food prn migraine., Disp: 5 tablet, Rfl: 0  •  fluticasone (FLONASE) 50 mcg/act nasal spray, 2 sprays into each nostril daily, Disp: 16 mL, Rfl: 1  •  ketorolac (TORADOL) 10 mg tablet, TAKE 1 TABLET EVERY 6 HOURS AS NEEDED FOR MIGRAINE (WITH COMPAZINE IF NEEDED). MAX 2/DAY & 3/WEEK., Disp: 30 tablet, Rfl: 0  •  levothyroxine 50 mcg tablet, TAKE 1 TABLET BY MOUTH EVERY DAY, Disp: 30 tablet, Rfl: 11  •  losartan (COZAAR) 25 mg tablet, Take 1 tablet (25 mg total) by mouth daily, Disp: 90 tablet, Rfl: 3  •  Magnesium 200 MG TABS, Take 200 mg by mouth 2 (two) times a day, Disp: , Rfl:   •  meclizine (ANTIVERT) 12.5 MG tablet, 1-2 tabs TID prn dizziness, Disp: 60 tablet, Rfl: 2  •  MULTIPLE VITAMINS ESSENTIAL PO, Take by mouth, Disp: , Rfl:   •  other medication, see sig,, Medication/product name: estradiol 1 mg/testosterone 12 mg /ml cream Strength: as above Sig (include dose, route, frequency): apply 0.5 ml to labia daily, Disp: 15 mg, Rfl: 5  •  prochlorperazine (COMPAZINE) 10 mg tablet, Take 1 tablet (10 mg total) by mouth every 8 (eight) hours as needed for nausea or vomiting, Disp: 30 tablet, Rfl: 3  •  Progesterone 100 MG CAPS, TAKE 100 MG BY MOUTH AT BEDTIME, Disp: 30 capsule, Rfl: 11  •  rimegepant sulfate (NURTEC) 75 mg TBDP, 1 tab q other day. No more than one dose per 24 hours., Disp: 16 tablet, Rfl: 11  •  Botox 200 units SOLR, INJECT 200 UNITS IN THE MUSCLE OF VARIOUS SITES OF THE HEAD AND NECK ONCE EVERY 3 MONTHS, Disp: 1 each, Rfl: 0  No current facility-administered medications for this visit.    ALLERGIES:  Allergies   Allergen Reactions   • Penicillins    • Doxycycline    • Erythromycin    • Other      Mushrooms           REVIEW OF SYSTEMS:  Musculoskeletal:        As noted in HPI.   All other systems reviewed and are negative.    VITALS:  There were no vitals filed for this visit.    LABS:  HgA1c: No results found for: \"HGBA1C\"  BMP:   Lab Results   Component Value Date    GLUCOSE 92 " 12/18/2015    CALCIUM 9.7 02/12/2022     12/18/2015    K 4.6 02/12/2022    CO2 30 02/12/2022     02/12/2022    BUN 14 02/12/2022    CREATININE 0.83 02/12/2022       _____________________________________________________  PHYSICAL EXAMINATION:  General: Well developed and well nourished, alert & oriented x 3, appears comfortable  Psychiatric: Normal  HEENT: Normocephalic, Atraumatic Trachea Midline, No torticollis  Pulmonary: No audible wheezing or respiratory distress   Abdomen/GI: Non tender, non distended   Cardiovascular: No pitting edema, 2+ radial pulse   Skin: No masses, erythema, lacerations, fluctation, ulcerations  Neurovascular: Sensation Intact to the Median, Ulnar, Radial Nerve, Motor Intact to the Median, Ulnar, Radial Nerve, and Pulses Intact  Musculoskeletal: Normal, except as noted in detailed exam and in HPI.      MUSCULOSKELETAL EXAMINATION:  Right hand  TTP thumb CMC  + shoulder sign  Full fist  Compartments soft  Brisk capillary refill     ___________________________________________________  STUDIES REVIEWED:  Xrays of the right hand were reviewed and independently interpreted in PACS by Dr. Penaloza and demonstrate right thumb CMC arthritis and Lab results were reviewed and independently interpreted by Dr. Penaloza and demonstrate Multiple minute fragments of bone and dense fibrocartilagenous tissue pathology from prior sx 1/29/24.        PROCEDURES PERFORMED:  Small joint arthrocentesis: R thumb CMC  [place] Protocol:  Consent: Verbal consent obtained.  Risks and benefits: risks, benefits and alternatives were discussed  Consent given by: patient  Patient understanding: patient states understanding of the procedure being performed  Patient identity confirmed: verbally with patient  Supporting Documentation  Indications: pain   Procedure Details  Location: thumb - R thumb CMC  Needle size: 25 G  Ultrasound guidance: no  Approach: volar  Medications administered: 6 mg betamethasone  acetate-betamethasone sodium phosphate 6 (3-3) mg/mL; 1 mL bupivacaine (PF) 0.25 %    Patient tolerance: patient tolerated the procedure well with no immediate complications  Dressing:  Sterile dressing applied      Small joint arthrocentesis: R long PIP  Universal Protocol:  Consent: Verbal consent obtained.  Consent given by: patient  Patient identity confirmed: verbally with patient  Supporting Documentation  Indications: pain   Procedure Details  Location: long finger - R long PIP  Preparation: Patient was prepped and draped in the usual sterile fashion  Needle size: 25 G  Ultrasound guidance: no  Medications administered: 1 mL bupivacaine (PF) 0.25 %; 6 mg betamethasone acetate-betamethasone sodium phosphate 6 (3-3) mg/mL    Patient tolerance: patient tolerated the procedure well with no immediate complications  Dressing:  Sterile dressing applied             _____________________________________________________      Scribe Attestation    I,:  Lynda Barreto MA am acting as a scribe while in the presence of the attending physician.:       I,:  David Penaloza MD personally performed the services described in this documentation    as scribed in my presence.:

## 2024-03-18 ENCOUNTER — EVALUATION (OUTPATIENT)
Dept: PHYSICAL THERAPY | Facility: MEDICAL CENTER | Age: 59
End: 2024-03-18
Payer: COMMERCIAL

## 2024-03-18 DIAGNOSIS — M18.11 ARTHRITIS OF CARPOMETACARPAL (CMC) JOINT OF RIGHT THUMB: Primary | ICD-10-CM

## 2024-03-18 DIAGNOSIS — M19.041 ARTHRITIS OF FINGER OF RIGHT HAND: ICD-10-CM

## 2024-03-18 PROCEDURE — 97110 THERAPEUTIC EXERCISES: CPT

## 2024-03-18 PROCEDURE — 97161 PT EVAL LOW COMPLEX 20 MIN: CPT

## 2024-03-18 NOTE — PROGRESS NOTES
PT Evaluation     Today's date: 3/18/2024  Patient name: Penny J Clossey  : 1965  MRN: [id number]  Referring provider: David Penaloza MD  Dx:   Encounter Diagnosis     ICD-10-CM    1. Arthritis of carpometacarpal (CMC) joint of right thumb  M18.11 Ambulatory Referral to PT/OT Hand Therapy     PT plan of care cert/re-cert      2. Arthritis of finger of right hand  M19.041 Ambulatory Referral to PT/OT Hand Therapy     PT plan of care cert/re-cert        Eval/Re-Eval POC Expires Auth #/ Referral # Total Visits Start Date Expiration Date Extension Info Visits Limitation   3/18  [employer] - no auth           75                                                 1 2 3 4 5 6   3/18        7 8 9 10 11 12           13 14 15 16 17 18           19 20 21 22 23 24           25 26 27 28 29 30             Start Time: 1545  Stop Time: 1630  Total time in clinic (min): 45 minutes    Assessment  Assessment details: Penny J Clossey is a 58 y.o. female who presents to PT with a referral from Dr. Penaloza for a medical diagnosis of Arthritis of carpometacarpal (CMC) joint of right thumb  (primary encounter diagnosis), Arthritis of finger of right hand. Patient presents to PT with limitations in ROM, strength, and functional mobility secondary to pain, decreased muscular endurance, and decreased neuromuscular control. Patient demonstrated decreased global wrist strength as well as thumb strength. Patient noted some tenderness during palpation over scaphoid, extensor carpi radialis longus and brevis. Patient's key impairments include: decreased ROM, decreased strength, decreased endurance, pain with functional activities, and poor body mechanics. The limitations listed above affect patient's ability to lift/carry, push, pull, get dressed, and grasp, perform recreational activities and ADLs and decrease patient's quality of life. Patient to benefit from skilled PT to address these limitations,  increase ROM, improve strength,  reduce pain, improve activity tolerance, and improve quality of life   Impairments: abnormal muscle firing, abnormal muscle tone, abnormal or restricted ROM, activity intolerance, impaired physical strength, lacks appropriate home exercise program, pain with function and poor body mechanics    Symptom irritability: lowUnderstanding of Dx/Px/POC: good   Prognosis: good    Goals  STG (2-4 Weeks)  Patient will have an increase in global wrist strength to a 4/5 MMT to promote increased stability in 4 weeks.  Patient will have a decrease in pain at worst by 2 points on the NPRS to improve quality of life in 4 weeks.  Patient will be efficient and compliant with comprehensive HEP in 4 weeks.    LTG (4-8 Weeks)  Patient will have a FOTO of anticipated or greater by discharge  Patient will be able to type on her phone without pain to improve quality of life by discharge.   Patient will be able to return to PLOF without pain to improve quality of life.    Plan  Patient would benefit from: skilled physical therapy  Planned modality interventions: TENS, thermotherapy: hydrocollator packs, cryotherapy, electrical stimulation/Russian stimulation, traction and unattended electrical stimulation  Planned therapy interventions: abdominal trunk stabilization, IASTM, joint mobilization, activity modification, kinesiology taping, ADL training, manual therapy, massage, Sawyer taping, balance, balance/weight bearing training, motor coordination training, behavior modification, muscle pump exercises, body mechanics training, nerve gliding, neuromuscular re-education, breathing training, patient education, postural training, coordination, self care, transfer training, therapeutic training, therapeutic exercise, therapeutic activities, stretching, strengthening, fine motor coordination training, flexibility, functional ROM exercises, gait training, graded activity, graded exercise, graded motor, home exercise program, IADL retraining and  work reintegration  Frequency: 2x week  Duration in weeks: 12  Plan of Care beginning date: 3/18/2024  Plan of Care expiration date: 6/10/2024  Treatment plan discussed with: patient        Subjective Evaluation    History of Present Illness  Onset date: 4 weeks ago.  Mechanism of injury: Patient reports to PT with a CC of R hand and thumb pain that started 4 weeks ago that started getting worse and worse. Patient currently works [employer],. Patient rated their current pain 2/10, at its worst 8/10, and at its best 0/10 on the NPRS. Patient described the pain as dull and achy and stated that it lasts for an extended period of time. Patient stated that heat and rest makes it better, and grasping, moving her finger, lifting, pushing, pulling, holding a pen, and texting makes it worse. Patient reported that the pain is worse depending on usage and denies waking them at night. Patient denies changes to  bowel and bladder, chest pain, night pain, or fever. Patient reports having prior scaphoid surgery on thumb. Patient's goals of PT are to decrease pain, increase strength, and return to work without pain   Quality of life: good    Patient Goals  Patient goals for therapy: decreased pain, increased motion, increased strength and return to work    Pain  Current pain ratin  At best pain ratin  At worst pain ratin  Quality: dull ache  Relieving factors: support, rest, relaxation and heat  Aggravating factors: keyboarding and lifting          Objective     Palpation   Left   No palpable tenderness to the extensor carpi radialis brevis, extensor carpi radialis longus, extensor carpi ulnaris, extensor digitorum profundus, extensor pollicis longus, flexor carpi radialis, flexor carpi ulnaris, flexor digitorum profundus, flexor digitorum superficialis, flexor pollicis longus and intrinsics.     Right   No palpable tenderness to the extensor carpi ulnaris, extensor digitorum profundus, flexor carpi ulnaris, flexor  digitorum profundus and flexor pollicis longus.   Muscle spasm in the flexor digitorum superficialis.   Tenderness of the extensor carpi radialis brevis, extensor carpi radialis longus, extensor pollicis longus and flexor carpi radialis.     Tenderness     Right Wrist/Hand   Tenderness in the scaphoid.     Neurological Testing     Sensation     Wrist/Hand   Left   Intact: light touch    Right   Intact: light touch    Active Range of Motion     Left Wrist   Normal active range of motion    Right Wrist   Normal active range of motion  Ulnar deviation: with pain    Right Thumb   Flexion     CMC: 55  Extension     CMC: 15  Palmar Abduction    CMC: 46    Passive Range of Motion     Left Wrist   Normal passive range of motion    Right Wrist   Normal passive range of motion    Strength/Myotome Testing     Left Wrist/Hand   Normal wrist strength     (2nd hand position)     Trial 1: 95    Trial 2: 92    Trial 3: 88    Average: 91.67    Right Wrist/Hand   Wrist extension: 5 (pain)  Wrist flexion: 4-  Radial deviation: 5 (pain)  Ulnar deviation: 5     (2nd hand position)     Trial 1: 90    Trial 2: 90    Trial 3: 92    Average: 90.67    HEP Demonstrated and Performed:  Access Code: [email or web address]  URL: [email or web address]  Date: 03/18/2024  Prepared by: Shyam Calabrese    Exercises  - Seated Wrist Flexion Stretch  - 2 x daily - 7 x weekly - 1 sets - 3 reps - 30s hold  - Seated Wrist Extension Stretch  - 2 x daily - 7 x weekly - 1 sets - 3 reps - 30s hold  - Towel Roll Squeeze  - 2 x daily - 7 x weekly - 2 sets - 10 reps  - Tip Pinch with Putty  - 2 x daily - 7 x weekly - 2 sets - 10 reps           Precautions: standard precautions,   Past Medical History:   Diagnosis Date    Abnormal Pap smear of cervix 1989    all normal since    Asthma     Carbon monoxide exposure 02/06/2018    Chronic back pain     Hashimoto's thyroiditis 01/09/2023    Lump of skin     last assessed 11/21/13    Migraine     Sinus bradycardia      last assessed 10/25/16    Varicella          PT 1:1 entire time  Manuals             PROM R Hand             PROM R Thumb             Jt Mobes             STM/Massage Gun             [facility]             Neuro Re-Ed             Towel Squeezes             Putty Pinch             Thumb AROM             Finger Web             Digiflex                                       Ther Ex             Wrist Flexion Sretch             Wrist Extension Stretch             Thumb flexion Stretch             Thumb Extension Stretch                                                                 Ther Activity                                       Gait Training                                       Modalities

## 2024-03-27 ENCOUNTER — APPOINTMENT (OUTPATIENT)
Dept: PHYSICAL THERAPY | Facility: MEDICAL CENTER | Age: 59
End: 2024-03-27
Payer: COMMERCIAL

## 2024-03-27 ENCOUNTER — OFFICE VISIT (OUTPATIENT)
Dept: PHYSICAL THERAPY | Facility: MEDICAL CENTER | Age: 59
End: 2024-03-27
Payer: COMMERCIAL

## 2024-03-27 DIAGNOSIS — M18.11 ARTHRITIS OF CARPOMETACARPAL (CMC) JOINT OF RIGHT THUMB: Primary | ICD-10-CM

## 2024-03-27 DIAGNOSIS — M19.041 ARTHRITIS OF FINGER OF RIGHT HAND: ICD-10-CM

## 2024-03-27 PROCEDURE — 97112 NEUROMUSCULAR REEDUCATION: CPT

## 2024-03-27 NOTE — PROGRESS NOTES
"Daily Note     Today's date: 3/27/2024  Patient name: Penny J Clossey  : 1965  MRN: [id number]  Referring provider: David Penaloza MD  Dx:   Encounter Diagnosis     ICD-10-CM    1. Arthritis of carpometacarpal (CMC) joint of right thumb  M18.11       2. Arthritis of finger of right hand  M19.041           Start Time: 1548  Stop Time: 1630  Total time in clinic (min): 42 minutes    Subjective: Patient stated that she is feeling okay today.      Objective: See treatment diary below      Assessment: Tolerated treatment well. Patient noted some discomfort with wrist flexion/extension stretch, resided with rest. Patient demonstrated fatigue post treatment, exhibited good technique with therapeutic exercises, and would benefit from continued PT      Plan: Continue per plan of care.      Precautions: standard precautions,   Past Medical History:   Diagnosis Date    Abnormal Pap smear of cervix     all normal since    Asthma     Carbon monoxide exposure 2018    Chronic back pain     Hashimoto's thyroiditis 2023    Lump of skin     last assessed 13    Migraine     Sinus bradycardia     last assessed 10/25/16    Varicella          PT 1:1 [id number]  Manuals             PROM R Hand             PROM R Thumb             Jt Mobes             STM/Massage Gun             [place] IB 10'            Neuro Re-Ed             Towel Squeezes             Thumb AROM             Finger Web 20x Yellow            Digiflex 20x Blue            Towel Pinches 10x ea            Therabar 20x Red            Supination/Pronation 20x 2#            Ther Ex             Wrist Flexion Sretch 30\" 3x            Wrist Extension Stretch 30\" 3x            Thumb flexion Stretch 30\" 3x            Thumb Extension Stretch 30\" 3x                                                                Ther Activity                                       Gait Training                                       Modalities             HP 10'          "

## 2024-04-01 ENCOUNTER — APPOINTMENT (OUTPATIENT)
Dept: PHYSICAL THERAPY | Facility: MEDICAL CENTER | Age: 59
End: 2024-04-01
Payer: COMMERCIAL

## 2024-04-09 ENCOUNTER — OFFICE VISIT (OUTPATIENT)
Dept: PHYSICAL THERAPY | Facility: MEDICAL CENTER | Age: 59
End: 2024-04-09
Payer: COMMERCIAL

## 2024-04-09 DIAGNOSIS — M18.11 ARTHRITIS OF CARPOMETACARPAL (CMC) JOINT OF RIGHT THUMB: Primary | ICD-10-CM

## 2024-04-09 DIAGNOSIS — M19.041 ARTHRITIS OF FINGER OF RIGHT HAND: ICD-10-CM

## 2024-04-09 PROCEDURE — 97110 THERAPEUTIC EXERCISES: CPT

## 2024-04-09 PROCEDURE — 97112 NEUROMUSCULAR REEDUCATION: CPT

## 2024-04-09 PROCEDURE — 97140 MANUAL THERAPY 1/> REGIONS: CPT

## 2024-04-09 NOTE — PROGRESS NOTES
Daily Note     Today's date: 2024  Patient name: Penny J Clossey  : 1965  MRN: [id number]  Referring provider: David Penaloza MD  Dx:   Encounter Diagnosis     ICD-10-CM    1. Arthritis of carpometacarpal (CMC) joint of right thumb  M18.11       2. Arthritis of finger of right hand  M19.041         Eval/Re-Eval POC Expires Auth #/ Referral # Total Visits Start Date Expiration Date Extension Info Visits Limitation   3/18  [employer] - no auth           75                                                 1 2 3 4 5 6   3/18 3/27 4/9      7 8 9 10 11 12           13 14 15 16 17 18           19 20 21 22 23 24           25 26 27 28 29 30             Start Time: 1512  Stop Time: 1600  Total time in clinic (min): 48 minutes    Subjective: Patient stated that her tip of her thumb is hurting a decent amount today      Objective: See treatment diary below      Assessment: Tolerated treatment well. Patient with some discomfort with digiflex, improved with reps. Patient demonstrated fatigue post treatment, exhibited good technique with therapeutic exercises, and would benefit from continued PT      Plan: Continue per plan of care.      Precautions: standard precautions,   Past Medical History:   Diagnosis Date    Abnormal Pap smear of cervix     all normal since    Asthma     Carbon monoxide exposure 2018    Chronic back pain     Hashimoto's thyroiditis 2023    Lump of skin     last assessed 13    Migraine     Sinus bradycardia     last assessed 10/25/16    Varicella          PT 1:1 entire time  Manuals 3/27 4/9           PROM R Hand             PROM R Thumb             Jt Mobes             STM/Massage Gun             IASTM IB 10' IB 10'           Neuro Re-Ed             Towel Squeezes             Thumb AROM             Finger Web 20x Yellow 20x Yellow           Digiflex 20x Blue 20x Black            Towel Pinches 10x ea 15x ea           Therabar 20x Red 20x Red          "  Supination/Pronation 20x 2#            Ther Ex             Wrist Flexion Sretch 30\" 3x 30\" 3x           Wrist Extension Stretch 30\" 3x 30\" 3x           Thumb flexion Stretch 30\" 3x 30\" 3x           Thumb Extension Stretch 30\" 3x 30\" 3x                                                               Ther Activity                                       Gait Training                                       Modalities              10' 10'                             "

## 2024-04-16 ENCOUNTER — APPOINTMENT (OUTPATIENT)
Dept: PHYSICAL THERAPY | Facility: MEDICAL CENTER | Age: 59
End: 2024-04-16
Payer: COMMERCIAL

## 2024-05-29 ENCOUNTER — OFFICE VISIT (OUTPATIENT)
Dept: CARDIOLOGY CLINIC | Facility: MEDICAL CENTER | Age: 59
End: 2024-05-29
Payer: COMMERCIAL

## 2024-05-29 VITALS
BODY MASS INDEX: 30.66 KG/M2 | WEIGHT: 184 LBS | DIASTOLIC BLOOD PRESSURE: 84 MMHG | SYSTOLIC BLOOD PRESSURE: 128 MMHG | OXYGEN SATURATION: 99 % | HEIGHT: 65 IN | HEART RATE: 75 BPM

## 2024-05-29 DIAGNOSIS — I10 ESSENTIAL HYPERTENSION: Primary | ICD-10-CM

## 2024-05-29 DIAGNOSIS — R00.2 PALPITATIONS: ICD-10-CM

## 2024-05-29 DIAGNOSIS — E78.00 HYPERCHOLESTEROLEMIA: ICD-10-CM

## 2024-05-29 DIAGNOSIS — I31.9 CHRONIC IDIOPATHIC PERICARDITIS, UNSPECIFIED COMPLICATION STATUS: ICD-10-CM

## 2024-05-29 PROCEDURE — 99214 OFFICE O/P EST MOD 30 MIN: CPT | Performed by: INTERNAL MEDICINE

## 2024-05-29 NOTE — PROGRESS NOTES
Cardiology   Penny J Clossey 58 y.o. female MRN: [id number]        Impression:  1. Hypertension - Improved control.    2. Dyslipidemia - High LDL and HDL.    3. Palpitations - worsening.  Occurs multiple times a week. Will evaluate for rhythm issues with Zio monitor. Likely precipitated by elevated T3 from Hashimoto's thyroiditis.  4. Chest tightness - likely pericarditis. Improving.      Recommendations:  1. Continue colchicine 0.6mg daily.  2. Continue remainder of medications.  3. Check 1 week Zio monitor to evaluate dysrhythmia.  4. Follow up in 6 months.            HPI: Penny J Clossey is a 58 y.o. year old female with hypertension, dyslipidemia, premature atrial contractions, and migraines, who presents for follow up.  Echo demonstrated normal cardiac function with mild mitral regurgitation, and holter demonstrated no dysrhythmias.  Started on oral estrogen with increase in BP.  Also with chest pain when lying flat.  Recently diagnosed with Hashimoto's thyroiditis.         Review of Systems   Constitutional: Negative.    HENT: Negative.     Eyes: Negative.    Respiratory:  Negative for chest tightness and shortness of breath.    Cardiovascular:  Positive for palpitations. Negative for chest pain and leg swelling.   Gastrointestinal: Negative.    Endocrine: Negative.    Genitourinary: Negative.    Musculoskeletal: Negative.    Skin: Negative.    Allergic/Immunologic: Negative.    Neurological: Negative.    Hematological: Negative.    Psychiatric/Behavioral: Negative.     All other systems reviewed and are negative.        Past Medical History:   Diagnosis Date    Abnormal Pap smear of cervix 1989    all normal since    Asthma     Carbon monoxide exposure 02/06/2018    Chronic back pain     Hashimoto's thyroiditis 01/09/2023    Lump of skin     last assessed 11/21/13    Migraine     Sinus bradycardia     last assessed 10/25/16    Varicella      Past Surgical History:   Procedure Laterality Date    APPENDECTOMY       BREAST CYST EXCISION Right 2001    benign    COLONOSCOPY      TONSILLECTOMY      VARICOSE VEIN SURGERY      WISDOM TOOTH EXTRACTION Bilateral      Social History     Substance and Sexual Activity   Alcohol Use Yes    Alcohol/week: 7.0 standard drinks of alcohol    Types: 7 Glasses of wine per week    Comment: social per [employer]     Social History     Substance and Sexual Activity   Drug Use No     Social History     Tobacco Use   Smoking Status Former    Current packs/day: 0.00    Types: Cigarettes    Quit date:     Years since quittin.4   Smokeless Tobacco Never     Family History   Problem Relation Age of Onset    Heart failure Mother         CHF    Heart attack Father     Colon cancer Father 50    Heart disease Sister     No Known Problems Sister     Hypertension Brother     Stroke Brother     No Known Problems Maternal Grandmother     Lung cancer Maternal Grandfather     No Known Problems Paternal Grandmother     No Known Problems Paternal Grandfather     No Known Problems Son     No Known Problems Son     No Known Problems Son     Diabetes Maternal Aunt     Stroke Maternal Aunt     No Known Problems Paternal Aunt     No Known Problems Paternal Aunt     No Known Problems Paternal Aunt     Arthritis Family     Breast cancer Neg Hx        Allergies:  Allergies   Allergen Reactions    Penicillins     Doxycycline     Erythromycin     Other      Mushrooms       Medications:     Current Outpatient Medications:     aspirin 81 mg chewable tablet, Chew 81 mg daily, Disp: , Rfl:     colchicine (COLCRYS) 0.6 mg tablet, Take 1 tablet (0.6 mg total) by mouth daily, Disp: 90 tablet, Rfl: 3    dexamethasone (DECADRON) 2 mg tablet, 1 tab qam with food prn migraine., Disp: 5 tablet, Rfl: 0    fluticasone (FLONASE) 50 mcg/act nasal spray, 2 sprays into each nostril daily, Disp: 16 mL, Rfl: 1    ketorolac (TORADOL) 10 mg tablet, TAKE 1 TABLET EVERY 6 HOURS AS NEEDED FOR MIGRAINE (WITH COMPAZINE IF NEEDED). MAX 2/DAY  & 3/WEEK., Disp: 30 tablet, Rfl: 0    levothyroxine 50 mcg tablet, TAKE 1 TABLET BY MOUTH EVERY DAY, Disp: 30 tablet, Rfl: 11    losartan (COZAAR) 25 mg tablet, Take 1 tablet (25 mg total) by mouth daily, Disp: 90 tablet, Rfl: 3    Magnesium 200 MG TABS, Take 200 mg by mouth 2 (two) times a day, Disp: , Rfl:     meclizine (ANTIVERT) 12.5 MG tablet, 1-2 tabs TID prn dizziness, Disp: 60 tablet, Rfl: 2    MULTIPLE VITAMINS ESSENTIAL PO, Take by mouth, Disp: , Rfl:     other medication, see sig,, Medication/product name: estradiol 1 mg/testosterone 12 mg /ml cream Strength: as above Sig (include dose, route, frequency): apply 0.5 ml to labia daily, Disp: 15 mg, Rfl: 5    prochlorperazine (COMPAZINE) 10 mg tablet, Take 1 tablet (10 mg total) by mouth every 8 (eight) hours as needed for nausea or vomiting, Disp: 30 tablet, Rfl: 3    Progesterone 100 MG CAPS, TAKE 100 MG BY MOUTH AT BEDTIME, Disp: 30 capsule, Rfl: 11    rimegepant sulfate (NURTEC) 75 mg TBDP, 1 tab q other day. No more than one dose per 24 hours., Disp: 16 tablet, Rfl: 11    Botox 200 units SOLR, INJECT 200 UNITS IN THE MUSCLE OF VARIOUS SITES OF THE HEAD AND NECK ONCE EVERY 3 MONTHS, Disp: 1 each, Rfl: 0      Wt Readings from Last 3 Encounters:   05/29/24 83.5 kg (184 lb)   03/11/24 82.6 kg (182 lb)   10/20/23 82.6 kg (182 lb)     Temp Readings from Last 3 Encounters:   10/04/23 98.4 °F (36.9 °C) (Temporal)   09/13/23 98.6 °F (37 °C) (Temporal)   07/05/23 97.5 °F (36.4 °C) (Temporal)     BP Readings from Last 3 Encounters:   05/29/24 128/84   10/20/23 170/90   10/04/23 (!) 185/81     Pulse Readings from Last 3 Encounters:   05/29/24 75   10/20/23 73   10/04/23 61         Physical Exam  HENT:      Head: Atraumatic.      Mouth/Throat:      Mouth: Mucous membranes are moist.   Eyes:      Extraocular Movements: Extraocular movements intact.   Cardiovascular:      Rate and Rhythm: Normal rate and regular rhythm.      Pulses: Normal pulses.      Heart sounds:  "Normal heart sounds.   Pulmonary:      Effort: Pulmonary effort is normal.      Breath sounds: Normal breath sounds.   Abdominal:      General: Abdomen is flat.   Musculoskeletal:         General: Normal range of motion.      Cervical back: Normal range of motion.   Skin:     General: Skin is warm.   Neurological:      General: No focal deficit present.      Mental Status: She is alert and oriented to person, place, and time.   Psychiatric:         Mood and Affect: Mood normal.         Behavior: Behavior normal.           Laboratory Studies:  CMP:  Lab Results   Component Value Date     12/18/2015    K 4.6 02/12/2022     02/12/2022    CO2 30 02/12/2022    ANIONGAP 10 12/18/2015    BUN 14 02/12/2022    CREATININE 0.83 02/12/2022    GLUCOSE 92 12/18/2015    AST 19 03/11/2021    ALT 31 03/11/2021    BILITOT 0.64 12/18/2015    EGFR 79 02/12/2022       Lipid Profile:   No results found for: \"CHOL\"  Lab Results   Component Value Date    HDL 85 02/12/2022     Lab Results   Component Value Date    LDLCALC 154 (H) 02/12/2022     Lab Results   Component Value Date    TRIG 89 02/12/2022                 "

## 2024-07-08 ENCOUNTER — EVENT RECORDER/EXTENDED HOLTER (OUTPATIENT)
Dept: CARDIOLOGY CLINIC | Facility: MEDICAL CENTER | Age: 59
End: 2024-07-08
Payer: COMMERCIAL

## 2024-07-08 DIAGNOSIS — R00.2 PALPITATIONS: ICD-10-CM

## 2024-07-08 PROCEDURE — 93244 EXT ECG>48HR<7D REV&INTERPJ: CPT | Performed by: INTERNAL MEDICINE

## 2024-07-26 ENCOUNTER — TELEPHONE (OUTPATIENT)
Dept: CARDIOLOGY CLINIC | Facility: CLINIC | Age: 59
End: 2024-07-26

## 2024-07-26 NOTE — TELEPHONE ENCOUNTER
Called patient and gave result.       ----- Message from Wilder Alberto MD sent at 7/23/2024 11:13 AM EDT -----  Please let patient know Zio monitor looked normal.  No significant rhythm issues.  Thanks.

## 2024-08-24 DIAGNOSIS — G43.709 CHRONIC MIGRAINE WITHOUT AURA WITHOUT STATUS MIGRAINOSUS, NOT INTRACTABLE: ICD-10-CM

## 2024-08-26 ENCOUNTER — TELEPHONE (OUTPATIENT)
Age: 59
End: 2024-08-26

## 2024-08-26 NOTE — TELEPHONE ENCOUNTER
Looks like PA  on 24      [facility] PA completed on CMM  Key: [id number]  If Naeem has not responded to your request within 24 hours, contact Naeem at [phone]     [employer] message sent to lien

## 2024-08-26 NOTE — TELEPHONE ENCOUNTER
Nurtec    Patient comment: This script is normally mailed to me every 2 months, I'm assuming it came from this [facility] in [place]?? I've never had to pick this up i take every other day as prescribed     [facility] script last sent to [facility] on 23 with 11 refills.     PA .  PA completed in new encounter from .

## 2024-08-26 NOTE — TELEPHONE ENCOUNTER
rimegepant sulfate (NURTEC) 75 mg TBDP         Si tab q other day. No more than one dose per 24 hours.    Disp: 16 tablet    Refills: 0    Start: 2024    Class: Normal    Non-formulary For: Chronic migraine without aura without status migrainosus, not intractable    Last ordered: 9 months ago (2023) by Corazon Ward PA-C    Patient comment: This script is normally mailed to me every 2 months, I'm assuming it came from this [facility] in [place]?? I've never had to pick this up i take every other day as prescribed

## 2024-08-27 NOTE — TELEPHONE ENCOUNTER
Nurtec approved per CMM  [facility] approved from 7/27/24-8/26/25    Approval letter scanned under media and faxed to [employer]    [employer] message sent to pt   

## 2024-09-23 ENCOUNTER — OFFICE VISIT (OUTPATIENT)
Dept: NEUROLOGY | Facility: CLINIC | Age: 59
End: 2024-09-23
Payer: COMMERCIAL

## 2024-09-23 VITALS
DIASTOLIC BLOOD PRESSURE: 69 MMHG | HEART RATE: 67 BPM | WEIGHT: 184 LBS | SYSTOLIC BLOOD PRESSURE: 145 MMHG | HEIGHT: 65 IN | BODY MASS INDEX: 30.66 KG/M2

## 2024-09-23 DIAGNOSIS — G43.009 MIGRAINE WITHOUT AURA AND WITHOUT STATUS MIGRAINOSUS, NOT INTRACTABLE: Primary | ICD-10-CM

## 2024-09-23 DIAGNOSIS — R42 VERTIGO: ICD-10-CM

## 2024-09-23 PROCEDURE — 99213 OFFICE O/P EST LOW 20 MIN: CPT | Performed by: PHYSICIAN ASSISTANT

## 2024-09-23 NOTE — PROGRESS NOTES
Patient ID: Penny J Clossey is a 58 y.o. female.    Assessment/Plan:     Diagnoses and all orders for this visit:    Migraine without aura and without status migrainosus, not intractable  -     Discontinue: rimegepant sulfate (NURTEC) 75 mg TBDP; 1 tab q other day. No more than one dose per 24 hours.  -     rimegepant sulfate (NURTEC) 75 mg TBDP; 1 tab q other day. No more than one dose per 24 hours.    Vertigo    Other orders  -     INOSITOL PO; Take by mouth       Mary has noticed a significant reduction of migraine headaches after starting progesterone and other supplements on her med list, recommended by hormone specialist who she follows closely.  She does not feel that she needs the Botox at this time, so we will hold off on repeat injections.  If her migraine headaches become more frequent we can restart Botox.  Continue Nurtec every other day.  Continue magnesium daily  Can keep meclizine on hand as needed vertigo, which has also been under good control.    The patient should not hesitate to call me prior to her follow up with any questions or concerns.      Subjective:    HPI    Ms. Penny J Clossey is a very pleasant 58-year-old female who is here for neurological follow-up.  She works for the [employer].    She continues to follow with a hormone specialist in [place] [facility].  She uses progesterone and other supplements and she feels better on them.    HPI: Migraine f/u: Migraines have been ok, notices them more when the seasons changed, Nurtec does help.    She was able to stop Botox.  Since stopping Botox she did not feel that the migraine headaches were worse so she was happy about that.  She still takes Nurtec every other day, sometimes every 3 days if she misses a dose.  She denies side effects to Nurtec and she finds that it works well.  Headaches are the same in character, no focal deficits.  She does have sound sensitivity associated with the headaches.  Headache can be the forehead  "radiating into the top of the head.    Some headaches may be triggered by seasonal allergies.  Low-grade headaches secondary to allergies are in the periorbital region and above her cheeks on both sides.     When she gets a headache it can be diffuse, or in the sinus regions bilaterally.  Typically it is both sides.  She does have nausea, decreased appetite, light and sound sensitivity.    Headache pain level is 6 to 7/10 on average.  She has a lot of neck and upper back tension associated with the headaches (also a/w activity and moving arms a lot at work all day).  She uses heat packs. She denies vision changes.  She denies any change in character of the headaches since last seen.     HA/ migraine triggers- work/ stress and seasonal allergies     Migraines \"auras\" involve an episode of headache with dizziness and very sensitivity to high-pitched sounds, screeching, even her dogs barking.  When she gets this symptom she develops confusion and disorientation.  It lasts several hours and Toradol plus Reglan (or Compazine) cocktail helps.     Isra eyes- follows up regularly with her eye dr her.     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 Can hear things more acutely. Also noted above. No scotomas.     Medications tried:  Prevention-  -- Ajovy was helpful but she had stopped taking it due to high co-pay-   Over 700 dollars with each injection.  Venlafaxine  Cymbalta  Topamax  CGRP injectables  Gabapentin  Nortriptyline  Verapamil     Abortive-  Toradol  Compazine, Reglan  Meclizine  Fioricet  Decadron  Tylenol, ibuprofen, naproxen  Sumatriptan     Other non medication therapies:  Heating pad, electric massager     Brain MRI w/o 4/19/18: \"Few subcortical white matter " "lesions, nonspecific.  Early microangiopathic disease, sequela of migraine or vasculitis could have this appearance, in the appropriate clinical context.  Demyelinating process is not excluded though distribution of lesions is not typical.\"     HCT w/o 3/11/21: unremarkable.      The following portions of the patient's history were reviewed and updated as appropriate: She  has a past medical history of Abnormal Pap smear of cervix (1989), Asthma, Carbon monoxide exposure (02/06/2018), Chronic back pain, Hashimoto's thyroiditis (01/09/2023), Lump of skin, Migraine, Sinus bradycardia, and Varicella.  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 08/24/2021    Pelvic pain in female 07/06/2020    History of lumpectomy 07/06/2020    Skin rash 05/20/2020    Polyarthritis of multiple sites 04/17/2020    Phonophobia 02/14/2020    Vertiginous migraine 04/22/2019    Anxiety and depression 03/01/2019    Varicose veins of both lower extremities with pain 03/01/2019    Chronic migraine without aura without status migrainosus, not intractable 10/08/2018    Vertigo 02/06/2018    Essential hypertension 10/10/2016    Hypercholesterolemia 10/22/2014     She  has a past surgical history that includes Appendectomy; Tonsillectomy; [place] tooth extraction (Bilateral); Colonoscopy; Varicose vein surgery; and Breast cyst excision (Right, 2001).  Her family history includes Arthritis in her family; Colon cancer (age of onset: 50) in her father; Diabetes in her maternal aunt; Heart attack in her father; Heart disease in her sister; Heart failure in her mother; Hypertension in her brother; Lung cancer in her maternal grandfather; No Known Problems in her maternal grandmother, paternal aunt, paternal aunt, paternal aunt, paternal grandfather, paternal grandmother, sister, " son, son, and son; Stroke in her brother and maternal aunt.  She  reports that she quit smoking about 26 years ago. Her smoking use included cigarettes. She has never used smokeless tobacco. She reports current alcohol use of about 7.0 standard drinks of alcohol per week. She reports that she does not use drugs.  Current Outpatient Medications   Medication Sig Dispense Refill    aspirin 81 mg chewable tablet Chew 81 mg daily      colchicine (COLCRYS) 0.6 mg tablet Take 1 tablet (0.6 mg total) by mouth daily 90 tablet 3    fluticasone (FLONASE) 50 mcg/act nasal spray 2 sprays into each nostril daily 16 mL 1    INOSITOL PO Take by mouth      ketorolac (TORADOL) 10 mg tablet TAKE 1 TABLET EVERY 6 HOURS AS NEEDED FOR MIGRAINE (WITH COMPAZINE IF NEEDED). MAX 2/DAY & 3/WEEK. 30 tablet 0    losartan (COZAAR) 25 mg tablet Take 1 tablet (25 mg total) by mouth daily 90 tablet 3    Magnesium 200 MG TABS Take 200 mg by mouth 2 (two) times a day      meclizine (ANTIVERT) 12.5 MG tablet 1-2 tabs TID prn dizziness 60 tablet 2    MULTIPLE VITAMINS ESSENTIAL PO Take by mouth      other medication, see sig, Medication/product name: estradiol 1 mg/testosterone 12 mg /ml cream  Strength: as above  Sig (include dose, route, frequency): apply 0.5 ml to labia daily 15 mg 5    prochlorperazine (COMPAZINE) 10 mg tablet Take 1 tablet (10 mg total) by mouth every 8 (eight) hours as needed for nausea or vomiting 30 tablet 3    Progesterone 100 MG CAPS TAKE 100 MG BY MOUTH AT BEDTIME (Patient taking differently: Take 200 mg by mouth at bedtime) 30 capsule 11    rimegepant sulfate (NURTEC) 75 mg TBDP 1 tab q other day. No more than one dose per 24 hours. 16 tablet 11    dexamethasone (DECADRON) 2 mg tablet 1 tab qam with food prn migraine. (Patient not taking: Reported on 9/23/2024) 5 tablet 0    levothyroxine 50 mcg tablet TAKE 1 TABLET BY MOUTH EVERY DAY (Patient not taking: Reported on 9/23/2024) 30 tablet 11     No current  "facility-administered medications for this visit.     She is allergic to penicillins, doxycycline, erythromycin, and other..         Objective:    Blood pressure 145/69, pulse 67, height 5' 5\" (1.651 m), weight 83.5 kg (184 lb), not currently breastfeeding.  Body mass index is 30.62 kg/m².    Physical Exam    Neurological Exam  On neurologic exam, the patient is alert and oriented to time and place. Speech is fluent and articulate, and the patient follows commands appropriately. Judgment and affect appear normal. Pupils are equally round and reactive to light and extraocular muscles are intact without nystagmus. Face is symmetric, and tongue, uvula, and palate are midline. Hearing is intact. Motor examination reveals intact strength throughout. Normal gait is steady.      ROS:    Review of Systems  Constitutional:  Negative for appetite change, fatigue and fever.   HENT: Negative.  Negative for hearing loss, tinnitus, trouble swallowing and voice change.    Eyes: Negative.  Negative for photophobia, pain and visual disturbance.   Respiratory: Negative.  Negative for shortness of breath.    Cardiovascular: Negative.  Negative for palpitations.   Gastrointestinal: Negative.  Negative for nausea and vomiting.   Endocrine: Negative.  Negative for cold intolerance.   Genitourinary: Negative.  Negative for dysuria, frequency and urgency.   Musculoskeletal:  Negative for back pain, gait problem, myalgias, neck pain and neck stiffness.   Skin: Negative.  Negative for rash.   Allergic/Immunologic: Negative.    Neurological:  Positive for headaches. Negative for dizziness, tremors, seizures, syncope, facial asymmetry, speech difficulty, weakness, light-headedness and numbness.   Hematological: Negative.  Does not bruise/bleed easily.   Psychiatric/Behavioral: Negative.  Negative for confusion, hallucinations and sleep disturbance.       The following portions of the patient's history were reviewed and updated as appropriate: " allergies, current medications/ medication history, past family history, past medical history, past social history, past surgical history and problem list.    Review of systems was reviewed and otherwise unremarkable from a neurological perspective.

## 2024-09-23 NOTE — PROGRESS NOTES
Review of Systems   Constitutional:  Negative for appetite change, fatigue and fever.   HENT: Negative.  Negative for hearing loss, tinnitus, trouble swallowing and voice change.    Eyes: Negative.  Negative for photophobia, pain and visual disturbance.   Respiratory: Negative.  Negative for shortness of breath.    Cardiovascular: Negative.  Negative for palpitations.   Gastrointestinal: Negative.  Negative for nausea and vomiting.   Endocrine: Negative.  Negative for cold intolerance.   Genitourinary: Negative.  Negative for dysuria, frequency and urgency.   Musculoskeletal:  Negative for back pain, gait problem, myalgias, neck pain and neck stiffness.   Skin: Negative.  Negative for rash.   Allergic/Immunologic: Negative.    Neurological:  Positive for headaches. Negative for dizziness, tremors, seizures, syncope, facial asymmetry, speech difficulty, weakness, light-headedness and numbness.   Hematological: Negative.  Does not bruise/bleed easily.   Psychiatric/Behavioral: Negative.  Negative for confusion, hallucinations and sleep disturbance.      HPI: Migraine f/u: Migraines have been ok, notices them more when the seasons changed, Nurtec does help.

## 2024-10-15 ENCOUNTER — OFFICE VISIT (OUTPATIENT)
Dept: CARDIOLOGY CLINIC | Facility: MEDICAL CENTER | Age: 59
End: 2024-10-15
Payer: COMMERCIAL

## 2024-10-15 VITALS
SYSTOLIC BLOOD PRESSURE: 138 MMHG | HEART RATE: 74 BPM | DIASTOLIC BLOOD PRESSURE: 80 MMHG | BODY MASS INDEX: 30.49 KG/M2 | HEIGHT: 65 IN | OXYGEN SATURATION: 100 % | WEIGHT: 183 LBS

## 2024-10-15 DIAGNOSIS — I10 ESSENTIAL HYPERTENSION: ICD-10-CM

## 2024-10-15 DIAGNOSIS — E78.00 HYPERCHOLESTEROLEMIA: ICD-10-CM

## 2024-10-15 DIAGNOSIS — I31.9 CHRONIC IDIOPATHIC PERICARDITIS, UNSPECIFIED COMPLICATION STATUS: ICD-10-CM

## 2024-10-15 DIAGNOSIS — I10 ESSENTIAL HYPERTENSION: Primary | ICD-10-CM

## 2024-10-15 DIAGNOSIS — R00.2 PALPITATIONS: ICD-10-CM

## 2024-10-15 PROCEDURE — 99214 OFFICE O/P EST MOD 30 MIN: CPT | Performed by: INTERNAL MEDICINE

## 2024-10-15 RX ORDER — COLCHICINE 0.6 MG/1
0.6 TABLET ORAL DAILY
Qty: 90 TABLET | Refills: 3 | Status: SHIPPED | OUTPATIENT
Start: 2024-10-15

## 2024-10-15 RX ORDER — METOPROLOL SUCCINATE 25 MG/1
25 TABLET, EXTENDED RELEASE ORAL DAILY
Qty: 90 TABLET | Refills: 3 | Status: SHIPPED | OUTPATIENT
Start: 2024-10-15

## 2024-10-15 NOTE — PATIENT INSTRUCTIONS
Recommendations:  1. Continue colchicine 0.6mg daily.  2. Start metoprolol succinate 25mg daily.   3 Continue remainder of medications.  4. Follow up in 6 months.

## 2024-10-15 NOTE — PROGRESS NOTES
Cardiology   Penny J Clossey 58 y.o. female MRN: [id number]        Impression:  1. Hypertension - Improved control.    2. Dyslipidemia - High LDL and HDL.    3. Palpitations - stable. Occurs multiple times a week. Zio monitor demonstrated premature atrial contractions.  Likely precipitated by elevated T3 from Hashimoto's thyroiditis.  4. Chest tightness - likely pericarditis. Improving.      Recommendations:  1. Continue colchicine 0.6mg daily.  2. Start metoprolol succinate 25mg daily.   3 Continue remainder of medications.  4. Follow up in 6 months.         HPI: Penny J Clossey is a 58 y.o. year old female with hypertension, dyslipidemia, premature atrial contractions, and migraines, who presents for follow up.  Echo demonstrated normal cardiac function with mild mitral regurgitation, and holter demonstrated no dysrhythmias.  Started on oral estrogen with increase in BP.  Also with chest pain when lying flat.  Recently diagnosed with Hashimoto's thyroiditis. Zio monitor 7/24 - frequent premature atrial contractions.         Review of Systems   Constitutional: Negative.    HENT: Negative.     Eyes: Negative.    Respiratory:  Negative for chest tightness and shortness of breath.    Cardiovascular:  Positive for palpitations. Negative for chest pain and leg swelling.   Gastrointestinal: Negative.    Endocrine: Negative.    Genitourinary: Negative.    Musculoskeletal: Negative.    Skin: Negative.    Allergic/Immunologic: Negative.    Neurological: Negative.    Hematological: Negative.    Psychiatric/Behavioral: Negative.     All other systems reviewed and are negative.        Past Medical History:   Diagnosis Date    Abnormal Pap smear of cervix 1989    all normal since    Asthma     Carbon monoxide exposure 02/06/2018    Chronic back pain     Hashimoto's thyroiditis 01/09/2023    Lump of skin     last assessed 11/21/13    Migraine     Sinus bradycardia     last assessed 10/25/16    Varicella      Past Surgical  History:   Procedure Laterality Date    APPENDECTOMY      BREAST CYST EXCISION Right 2001    benign    COLONOSCOPY      TONSILLECTOMY      VARICOSE VEIN SURGERY      WISDOM TOOTH EXTRACTION Bilateral      Social History     Substance and Sexual Activity   Alcohol Use Yes    Alcohol/week: 7.0 standard drinks of alcohol    Types: 7 Glasses of wine per week    Comment: social per [employer]     Social History     Substance and Sexual Activity   Drug Use No     Social History     Tobacco Use   Smoking Status Former    Current packs/day: 0.00    Types: Cigarettes    Quit date:     Years since quittin.8   Smokeless Tobacco Never     Family History   Problem Relation Age of Onset    Heart failure Mother         CHF    Heart attack Father     Colon cancer Father 50    Heart disease Sister     No Known Problems Sister     Hypertension Brother     Stroke Brother     No Known Problems Maternal Grandmother     Lung cancer Maternal Grandfather     No Known Problems Paternal Grandmother     No Known Problems Paternal Grandfather     No Known Problems Son     No Known Problems Son     No Known Problems Son     Diabetes Maternal Aunt     Stroke Maternal Aunt     No Known Problems Paternal Aunt     No Known Problems Paternal Aunt     No Known Problems Paternal Aunt     Arthritis Family     Breast cancer Neg Hx        Allergies:  Allergies   Allergen Reactions    Penicillins     Doxycycline     Erythromycin     Other      Mushrooms       Medications:     Current Outpatient Medications:     aspirin 81 mg chewable tablet, Chew 81 mg daily, Disp: , Rfl:     colchicine (COLCRYS) 0.6 mg tablet, Take 1 tablet (0.6 mg total) by mouth daily, Disp: 90 tablet, Rfl: 3    fluticasone (FLONASE) 50 mcg/act nasal spray, 2 sprays into each nostril daily, Disp: 16 mL, Rfl: 1    INOSITOL PO, Take by mouth, Disp: , Rfl:     ketorolac (TORADOL) 10 mg tablet, TAKE 1 TABLET EVERY 6 HOURS AS NEEDED FOR MIGRAINE (WITH COMPAZINE IF NEEDED). MAX  2/DAY & 3/WEEK., Disp: 30 tablet, Rfl: 0    losartan (COZAAR) 25 mg tablet, Take 1 tablet (25 mg total) by mouth daily, Disp: 90 tablet, Rfl: 3    Magnesium 200 MG TABS, Take 200 mg by mouth 2 (two) times a day, Disp: , Rfl:     meclizine (ANTIVERT) 12.5 MG tablet, 1-2 tabs TID prn dizziness, Disp: 60 tablet, Rfl: 2    MULTIPLE VITAMINS ESSENTIAL PO, Take by mouth, Disp: , Rfl:     other medication, see sig,, Medication/product name: estradiol 1 mg/testosterone 12 mg /ml cream Strength: as above Sig (include dose, route, frequency): apply 0.5 ml to labia daily, Disp: 15 mg, Rfl: 5    prochlorperazine (COMPAZINE) 10 mg tablet, Take 1 tablet (10 mg total) by mouth every 8 (eight) hours as needed for nausea or vomiting, Disp: 30 tablet, Rfl: 3    Progesterone 100 MG CAPS, TAKE 100 MG BY MOUTH AT BEDTIME (Patient taking differently: Take 200 mg by mouth at bedtime), Disp: 30 capsule, Rfl: 11    rimegepant sulfate (NURTEC) 75 mg TBDP, 1 tab q other day. No more than one dose per 24 hours., Disp: 16 tablet, Rfl: 11    dexamethasone (DECADRON) 2 mg tablet, 1 tab qam with food prn migraine. (Patient not taking: Reported on 9/23/2024), Disp: 5 tablet, Rfl: 0    levothyroxine 50 mcg tablet, TAKE 1 TABLET BY MOUTH EVERY DAY (Patient not taking: Reported on 9/23/2024), Disp: 30 tablet, Rfl: 11      Wt Readings from Last 3 Encounters:   10/15/24 83 kg (183 lb)   09/23/24 83.5 kg (184 lb)   05/29/24 83.5 kg (184 lb)     Temp Readings from Last 3 Encounters:   10/04/23 98.4 °F (36.9 °C) (Temporal)   09/13/23 98.6 °F (37 °C) (Temporal)   07/05/23 97.5 °F (36.4 °C) (Temporal)     BP Readings from Last 3 Encounters:   10/15/24 138/80   09/23/24 145/69   05/29/24 128/84     Pulse Readings from Last 3 Encounters:   10/15/24 74   09/23/24 67   05/29/24 75         Physical Exam  HENT:      Head: Atraumatic.      Mouth/Throat:      Mouth: Mucous membranes are moist.   Eyes:      Extraocular Movements: Extraocular movements intact.  "  Cardiovascular:      Rate and Rhythm: Normal rate and regular rhythm.      Heart sounds: Normal heart sounds.   Pulmonary:      Effort: Pulmonary effort is normal.      Breath sounds: Normal breath sounds.   Abdominal:      General: Abdomen is flat.   Musculoskeletal:         General: Normal range of motion.      Cervical back: Normal range of motion.   Skin:     General: Skin is warm.   Neurological:      General: No focal deficit present.      Mental Status: She is alert and oriented to person, place, and time.   Psychiatric:         Mood and Affect: Mood normal.         Behavior: Behavior normal.           Laboratory Studies:  CMP:  Lab Results   Component Value Date     12/18/2015    K 4.6 02/12/2022     02/12/2022    CO2 30 02/12/2022    ANIONGAP 10 12/18/2015    BUN 14 02/12/2022    CREATININE 0.83 02/12/2022    GLUCOSE 92 12/18/2015    AST 19 03/11/2021    ALT 31 03/11/2021    BILITOT 0.64 12/18/2015    EGFR 79 02/12/2022       Lipid Profile:   No results found for: \"CHOL\"  Lab Results   Component Value Date    HDL 85 02/12/2022     Lab Results   Component Value Date    LDLCALC 154 (H) 02/12/2022     Lab Results   Component Value Date    TRIG 89 02/12/2022               "

## 2024-10-16 RX ORDER — LOSARTAN POTASSIUM 25 MG/1
25 TABLET ORAL DAILY
Qty: 30 TABLET | Refills: 0 | Status: SHIPPED | OUTPATIENT
Start: 2024-10-16 | End: 2024-10-17 | Stop reason: SDUPTHER

## 2024-10-16 NOTE — TELEPHONE ENCOUNTER
Patient needs updated blood work. Please place orders. A courtesy refill was provided.    K is 5.3 or below and within 360 days    eGFR is 60 or above and within 360 days

## 2024-10-17 DIAGNOSIS — I10 ESSENTIAL HYPERTENSION: ICD-10-CM

## 2024-10-17 RX ORDER — LOSARTAN POTASSIUM 25 MG/1
25 TABLET ORAL DAILY
Qty: 90 TABLET | Refills: 3 | Status: SHIPPED | OUTPATIENT
Start: 2024-10-17

## 2024-10-23 NOTE — PATIENT INSTRUCTIONS
Recommendations:  1. Continue colchicine 0.6mg daily.  2. Continue remainder of medications.  3. Check 1 week Zio monitor to evaluate dysrhythmia.  4. Follow up in 6 months.    No

## 2024-11-03 DIAGNOSIS — G43.709 CHRONIC MIGRAINE WITHOUT AURA WITHOUT STATUS MIGRAINOSUS, NOT INTRACTABLE: ICD-10-CM

## 2024-11-05 RX ORDER — PROCHLORPERAZINE MALEATE 10 MG
10 TABLET ORAL EVERY 8 HOURS PRN
Qty: 30 TABLET | Refills: 0 | Status: SHIPPED | OUTPATIENT
Start: 2024-11-05

## 2024-11-05 RX ORDER — KETOROLAC TROMETHAMINE 10 MG/1
TABLET, FILM COATED ORAL
Qty: 30 TABLET | Refills: 0 | Status: SHIPPED | OUTPATIENT
Start: 2024-11-05

## 2024-12-19 ENCOUNTER — TELEPHONE (OUTPATIENT)
Age: 59
End: 2024-12-19

## 2024-12-19 NOTE — TELEPHONE ENCOUNTER
Patient sent a my chart msg in as she could not get through [facility] to get Refill of Nurtec.      I spoke to Mel to confirm they received our provider's new script from Sept 2024, as pt could not get through their phone line to request refill. Mel confirmed they have it and will send a link to pt to set up delivery. However, pt needs to still call their number  to confirm refill for delivery is still needed, as I can't advised them being an office, they will only accept this directly from the patient.    I sent pt a my chart msg per above info.

## 2025-01-09 ENCOUNTER — HOSPITAL ENCOUNTER (OUTPATIENT)
Dept: RADIOLOGY | Facility: HOSPITAL | Age: 60
End: 2025-01-09
Attending: STUDENT IN AN ORGANIZED HEALTH CARE EDUCATION/TRAINING PROGRAM
Payer: COMMERCIAL

## 2025-01-09 ENCOUNTER — OFFICE VISIT (OUTPATIENT)
Dept: OBGYN CLINIC | Facility: CLINIC | Age: 60
End: 2025-01-09
Payer: COMMERCIAL

## 2025-01-09 VITALS — BODY MASS INDEX: 29.19 KG/M2 | HEIGHT: 65 IN | WEIGHT: 175.2 LBS

## 2025-01-09 DIAGNOSIS — M25.511 RIGHT SHOULDER PAIN, UNSPECIFIED CHRONICITY: ICD-10-CM

## 2025-01-09 DIAGNOSIS — M75.21 BICEPS TENDONITIS ON RIGHT: Primary | ICD-10-CM

## 2025-01-09 PROCEDURE — 99213 OFFICE O/P EST LOW 20 MIN: CPT | Performed by: STUDENT IN AN ORGANIZED HEALTH CARE EDUCATION/TRAINING PROGRAM

## 2025-01-09 PROCEDURE — 73030 X-RAY EXAM OF SHOULDER: CPT

## 2025-01-09 RX ORDER — MELOXICAM 15 MG/1
15 TABLET ORAL DAILY
Qty: 30 TABLET | Refills: 2 | Status: SHIPPED | OUTPATIENT
Start: 2025-01-09 | End: 2025-04-09

## 2025-01-09 NOTE — PROGRESS NOTES
Initial Orthopedic Sports Medicine Office Visit    Assessment:  Penny J Clossey is a 59 y.o.  RHD female  with a history and physical examination consistent with right shoulder long head of the biceps tendinitis and rotator cuff tendinitis.    Plan:   Discussed x ray and physical exam findings of the right shoulder at length with patient in the office today. Discussed physical exam findings consistent with right shoulder long head biceps tendonitis. Discussed conservative treatment with activity modification, RICE therapies, and oral anti-inflammatories as needed for persistent pain and inflammation. Also discussed physical therapy to work on strengthening of the muscles around the shoulder as well as continued progression of range of motion. Patient expressed understanding. Referral placed to PT today. Discussed Meloxicam for persistent pain and inflammation of the right shoulder. Discussed side effects and risks of medication. Patient agreeable. Prescription and instruction for oral use provided to the patient today. Patient agreeable to the above therapies. She will follow up in 6 weeks for reevaluation of the right shoulder. If no improvement at this time, can consider MRI of the right shoulder for further evaluation of symptoms. All of patients questions were answered in the office today.       Conservative treatment:    PT for ROM and strengthening to shoulder, rotator cuff, scapular stabilizers.      Imaging:    All imaging from today was reviewed by myself and explained to the patient.       Injection:    No Injection planned at this time.      Surgery:     No surgery is recommended at this point, continue with conservative treatment plan as noted.      Follow up:    Return in about 6 weeks (around 2/20/2025). without x-rays    Assessment & Plan  Biceps tendonitis on right    Orders:    XR shoulder 2+ vw right; Future    Ambulatory Referral to Physical Therapy; Future    meloxicam (Mobic) 15 mg tablet; Take  1 tablet (15 mg total) by mouth daily      CC: right shoulder pain    History of Present Illness:  Penny J Clossey is a 59 y.o.  RHD female  who presents to the office for evaluation of her right shoulder.  Patient states she is a  and has had ongoing anterior right shoulder pain with radiation into her elbow for the last several months.  Patient states due to busy season she has been unable to follow-up with orthopedics until this time.  Upon evaluation today she notes continued anterior right shoulder pain increased with activity such as lifting weight or utilizing the arm to lift objects or turning doorknobs.  She also notes significant increase in pain with motions overhead.  She notes previous use of oral anti-inflammatories such as ibuprofen as well as stim for the right shoulder with persistent pain.  She denies previous surgery or injury.  She denies previous injections.    PMHx:   Past Medical History:   Diagnosis Date    Abnormal Pap smear of cervix 1989    all normal since    Asthma     Carbon monoxide exposure 02/06/2018    Chronic back pain     Hashimoto's thyroiditis 01/09/2023    Lump of skin     last assessed 11/21/13    Migraine     Sinus bradycardia     last assessed 10/25/16    Varicella      PSHx:   Past Surgical History:   Procedure Laterality Date    APPENDECTOMY      BREAST CYST EXCISION Right 2001    benign    COLONOSCOPY      TONSILLECTOMY      VARICOSE VEIN SURGERY      WISDOM TOOTH EXTRACTION Bilateral      Medications:   Current Outpatient Medications on File Prior to Visit   Medication Sig Dispense Refill    aspirin 81 mg chewable tablet Chew 81 mg daily      colchicine (COLCRYS) 0.6 mg tablet Take 1 tablet (0.6 mg total) by mouth daily 90 tablet 3    fluticasone (FLONASE) 50 mcg/act nasal spray 2 sprays into each nostril daily 16 mL 1    INOSITOL PO Take by mouth      ketorolac (TORADOL) 10 mg tablet TAKE 1 TABLET EVERY 6 HOURS AS NEEDED FOR MIGRAINE (WITH COMPAZINE IF  NEEDED). MAX 2/DAY & 3/WEEK. 30 tablet 0    losartan (COZAAR) 25 mg tablet Take 1 tablet (25 mg total) by mouth daily 90 tablet 3    Magnesium 200 MG TABS Take 200 mg by mouth 2 (two) times a day      meclizine (ANTIVERT) 12.5 MG tablet 1-2 tabs TID prn dizziness 60 tablet 2    metoprolol succinate (TOPROL-XL) 25 mg 24 hr tablet Take 1 tablet (25 mg total) by mouth daily 90 tablet 3    MULTIPLE VITAMINS ESSENTIAL PO Take by mouth      other medication, see sig, Medication/product name: estradiol 1 mg/testosterone 12 mg /ml cream  Strength: as above  Sig (include dose, route, frequency): apply 0.5 ml to labia daily 15 mg 5    prochlorperazine (COMPAZINE) 10 mg tablet Take 1 tablet (10 mg total) by mouth every 8 (eight) hours as needed for nausea or vomiting 30 tablet 0    Progesterone 100 MG CAPS TAKE 100 MG BY MOUTH AT BEDTIME 30 capsule 11    rimegepant sulfate (NURTEC) 75 mg TBDP 1 tab q other day. No more than one dose per 24 hours. 16 tablet 11    dexamethasone (DECADRON) 2 mg tablet 1 tab qam with food prn migraine. (Patient not taking: Reported on 9/23/2024) 5 tablet 0    levothyroxine 50 mcg tablet TAKE 1 TABLET BY MOUTH EVERY DAY (Patient not taking: Reported on 9/23/2024) 30 tablet 11     No current facility-administered medications on file prior to visit.     Allergies:   Allergies   Allergen Reactions    Penicillins     Doxycycline     Erythromycin     Other      Mushrooms      Social History: Employed as a .  Family History:   Family History   Problem Relation Age of Onset    Heart failure Mother         CHF    Heart attack Father     Colon cancer Father 50    Heart disease Sister     No Known Problems Sister     Hypertension Brother     Stroke Brother     No Known Problems Maternal Grandmother     Lung cancer Maternal Grandfather     No Known Problems Paternal Grandmother     No Known Problems Paternal Grandfather     No Known Problems Son     No Known Problems Son     No Known Problems Son      Diabetes Maternal Aunt     Stroke Maternal Aunt     No Known Problems Paternal Aunt     No Known Problems Paternal Aunt     No Known Problems Paternal Aunt     Arthritis Family     Breast cancer Neg Hx       Review of systems: ROS is negative other than that noted in the HPI.  Constitutional: Negative for fatigue and fever.   HENT: Negative for sore throat.    Respiratory: Negative for shortness of breath.    Cardiovascular: Negative for chest pain.   Gastrointestinal: Negative for abdominal pain.   Endocrine: Negative for cold intolerance and heat intolerance.   Genitourinary: Negative for flank pain.   Musculoskeletal: Negative for back pain.   Skin: Negative for rash.   Allergic/Immunologic: Negative for immunocompromised state.   Neurological: Negative for dizziness.   Psychiatric/Behavioral: Negative for agitation.       Comprehensive Physical Examination:  There were no vitals filed for this visit.     General Appearance: The patient is a well developed, well nourished female  in no apparent distress.  Orientation:  The patient is alert and interactive.  Oriented to time, place and person.  Mood and Affect:  The patient has normal mood and affect.    Shoulder focused exam:       RIGHT LEFT    Scapula Atrophy Negative Negative     Winging Negative Negative     Protraction Negative Negative    Rotator cuff SS 5/5 5/5     IS 5/5 5/5     SubS 5/5 5/5    ROM     170     ER0 60 60     ER90 90    90     IR90 45    45     IRb L1    L1    TTP: AC Negative Negative     Biceps Positive Negative     SC Joint Negative Negative     Scapular Spine Negative Negative     Proximal Humerus Negative Negative    Special Tests: O'Briens Negative Negative     Cross body Adduction Negative Negative     Speeds  Positive Negative     Glenn's Negative Negative     Neer Negative Negative     Johnson Positive Negative     Bear Hug Negative Negative    Instability: Apprehension & relocation not tested not tested     Load & shift  not tested not tested             UE NV Exam: +2 Radial pulses bilaterally  Sensation intact to light touch C5-T1 bilaterally, Radial/median/ulnar nerve motor intact    Bilateral elbow, wrist, and and forearm ROM full, painless with passive ROM, no ttp or crepitance throughout extremities below shoulder joint    Cervical ROM is full without pain, numbness or tingling. Negative Spurling.    Radiographic Imaging: I personally reviewed and interpreted 4 radiographs of her right shoulder including AP internal rotation, Grashey, axillary lateral, and scapular Y views which were taken in the office and reviewed with the patient in detail.  The shoulder is reduced.  There is minimal evidence of glenohumeral arthritis.  There is Mild DJD of her AC joint.  No significant acromiohumeral interval narrowing. No bony pathology is noted to be present.     Scribe Attestation      I,:  Carolina Dominguez PA-C am acting as a scribe while in the presence of the attending physician.:       I,:  Jerrod Murrieta MD personally performed the services described in this documentation    as scribed in my presence.:

## 2025-01-14 ENCOUNTER — EVALUATION (OUTPATIENT)
Dept: PHYSICAL THERAPY | Facility: MEDICAL CENTER | Age: 60
End: 2025-01-14
Payer: COMMERCIAL

## 2025-01-14 DIAGNOSIS — M75.21 BICEPS TENDONITIS ON RIGHT: ICD-10-CM

## 2025-01-14 PROCEDURE — 97161 PT EVAL LOW COMPLEX 20 MIN: CPT | Performed by: PHYSICAL THERAPIST

## 2025-01-14 PROCEDURE — 97140 MANUAL THERAPY 1/> REGIONS: CPT | Performed by: PHYSICAL THERAPIST

## 2025-01-14 NOTE — PROGRESS NOTES
PT Evaluation     Today's date: 2025  Patient name: Penny J Clossey  : 1965  MRN: [id number]  Referring provider: Carolina Dominguez PA-C  Dx:   Encounter Diagnosis     ICD-10-CM    1. Biceps tendonitis on right  M75.21 Ambulatory Referral to Physical Therapy                     Assessment  Impairments: abnormal muscle tone, abnormal or restricted ROM, abnormal movement, activity intolerance, impaired physical strength, lacks appropriate home exercise program, pain with function and unable to perform ADL    Assessment details: Penny J Clossey is a 59 y.o. female who presents with Biceps tendonitis on right.  Patient presents alert and oriented with the above impairments. . Mary will benefit from PT to addres deficits in order to maximize and return to prior level of function.  No further referral appears necessary at this time based upon examination results.  Prognosis is good given HEP compliance. Please contact me if you have any questions or recommendations.     Understanding of Dx/Px/POC: good     Prognosis: good    Goals  Short Term Goals:   1. Pain decreased 2 ratings in 4 weeks  2.  ROM increased 10* in 4 weeks  3.  Strength increased 1/2 grade in 4 weeks    Long Term Goals:   1.  ADLS/IADLS in related activities improved to maximal level in 8 weeks  2.  Work performance improved to maximal level in 8 weeks  3.  Recreational activities are improved to maximal level in 8 weeks.  4.  [place] with HEP in 8 weeks.     Plan  Patient would benefit from: PT eval and skilled physical therapy    Planned therapy interventions: IASTM, therapeutic exercise, stretching, strengthening, postural training, neuromuscular re-education, manual therapy, functional ROM exercises, flexibility and home exercise program    Frequency: 2x week  Duration in weeks: 8  Treatment plan discussed with: patient        Subjective Evaluation    History of Present Illness  Mechanism of injury: Pt reports onset of R shoulder  pain in September.  She was treating at home w/ ultrasound, TENS, heat and electro shockwave which seems to have helped.  She recently scheduled w/ ortho and was referred to PT.  She was also prescribed Meloxicam which she does not feel is helping and was also upsetting her stomach; therefore today she discontinued.  She presents today w/ reports of intermittent pain that occurs w/ movement, reaching away from body, reaching behind body, reaching overhead, opening a jar.  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   At times she feels weakness that radiates into her wrist.  She reports sleep disturbance when rolling onto her right side.  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    Patient Goals  Patient goals for therapy: decreased pain, increased motion, independence with ADLs/IADLs, increased strength and return to work    Pain  At best pain ratin  At worst pain ratin          Objective     Active Range of Motion   Left Shoulder   Flexion: 165 degrees   Abduction: 170 degrees     Right Shoulder   Flexion: 125 degrees   Abduction: 105 degrees     Passive Range of Motion     Right Shoulder   Flexion: 150 degrees   Abduction: 150 degrees   External rotation 45°: 85 degrees   Internal rotation 45°: 60 degrees     Strength/Myotome Testing     Right Shoulder     Planes of Motion   Flexion: 4+   Abduction: 4+   External rotation at 90°: 4+   Internal rotation at 90°: 4+       Flowsheet Rows      Flowsheet Row Most Recent Value   PT/OT G-Codes    Current Score 47   Projected Score 57   FOTO information reviewed Yes   Assessment Type Evaluation   G code set Other PT/OT Primary   Other PT Primary Current Status () CK   Other PT Primary Goal Status () CJ               Precautions: none      Manuals             Demi SOLO bicep/deltoid HEVER           "                                         Neuro Re-Ed                                                                                                        Ther Ex 1/14            pulleys nv            Table slides flex and scap 10\" x10 ea            Wall slides nv            Supine wand flex and scap nv            Strap IR stretch nv                                                   Ther Activity                                       Gait Training                                       Modalities                                          Eval/Re-Eval POC Expires Auth #/ Referral # Total Visits Start Date Expiration Date Extension Info Visits Limitation   1/14 3/14 BLUE CROSS FEP NO AUTH                                                                   1 2 3 4 5 6   1/14 F        7 8 9 10 11 12           13 14 15 16 17 18           19 20 21 22 23 24           25 26 27 28 29 30                                 "

## 2025-01-22 ENCOUNTER — OFFICE VISIT (OUTPATIENT)
Dept: PHYSICAL THERAPY | Facility: MEDICAL CENTER | Age: 60
End: 2025-01-22
Payer: COMMERCIAL

## 2025-01-22 DIAGNOSIS — M75.21 BICEPS TENDONITIS ON RIGHT: Primary | ICD-10-CM

## 2025-01-22 PROCEDURE — 97140 MANUAL THERAPY 1/> REGIONS: CPT | Performed by: PHYSICAL THERAPIST

## 2025-01-22 PROCEDURE — 97110 THERAPEUTIC EXERCISES: CPT | Performed by: PHYSICAL THERAPIST

## 2025-01-22 NOTE — PROGRESS NOTES
"Daily Note     Today's date: 2025  Patient name: Penny J Clossey  : 1965  MRN: [id number]  Referring provider: Carolina Dominguez PA-C  Dx:   Encounter Diagnosis     ICD-10-CM    1. Biceps tendonitis on right  M75.21           Start Time: 1525  Stop Time: 1610  Total time in clinic (min): 45 minutes    Subjective: Pt c/o soreness in bicep as well as teres region.  Fair compliance w/ HEP reporting most pain occurs w/ scaption table slides.      Objective: See treatment diary below      Assessment: Tolerated treatment well. Patient demonstrated fatigue post treatment, exhibited good technique with therapeutic exercises, and would benefit from continued PT  Mild restriction noted over teres and proximal tricep today.    Plan: Continue per plan of care.      Precautions: none      Manuals            [place] R bicep/deltoid HEVER 15 min                                                  Neuro Re-Ed                                                                                                        Ther Ex            pulleys nv 5 min           Table slides flex and scap 10\" x10 ea 10\" x10           Wall slides nv nv           Supine wand flex and scap nv 10\" x10           Strap IR stretch nv nv                                                  Ther Activity                                       Gait Training                                       Modalities                                            Eval/Re-Eval POC Expires Auth #/ Referral # Total Visits Start Date Expiration Date Extension Info Visits Limitation   1/14 3/14 BLUE CROSS FEP NO AUTH                                                                                     1 2 3 4 5 6    F             7 8 9 10 11 12                 13 14 15 16 17 18                 19 20 21 22 23 24                 25 26 27 28 29 30                    "

## 2025-01-23 ENCOUNTER — OFFICE VISIT (OUTPATIENT)
Dept: PHYSICAL THERAPY | Facility: MEDICAL CENTER | Age: 60
End: 2025-01-23
Payer: COMMERCIAL

## 2025-01-23 DIAGNOSIS — M75.21 BICEPS TENDONITIS ON RIGHT: Primary | ICD-10-CM

## 2025-01-23 PROCEDURE — 97110 THERAPEUTIC EXERCISES: CPT | Performed by: PHYSICAL THERAPIST

## 2025-01-23 PROCEDURE — 97140 MANUAL THERAPY 1/> REGIONS: CPT | Performed by: PHYSICAL THERAPIST

## 2025-01-23 NOTE — PROGRESS NOTES
"Daily Note     Today's date: 2025  Patient name: Penny J Clossey  : 1965  MRN: [id number]  Referring provider: Carolina Dominguez PA-C  Dx:   Encounter Diagnosis     ICD-10-CM    1. Biceps tendonitis on right  M75.21                      Subjective: Pt reports most soreness in posterior shoulder.  Mild soreness last night after visit and today during work due to having some heavy packages.      Objective: See treatment diary below      Assessment: Tolerated treatment well. Patient demonstrated fatigue post treatment, exhibited good technique with therapeutic exercises, and would benefit from continued PT  Most restricted today over teres    Plan: Continue per plan of care.      Precautions: none      Manuals           [place]i R bicep/deltoid HEVER 15 min 15 min                                                 Neuro Re-Ed                                                                                                        Ther Ex           pulleys nv 5 min 5 min          Table slides flex and scap 10\" x10 ea 10\" x10 hep          Wall slides nv nv 10\" x10          Supine wand flex and scap nv 10\" x10 10\" x10          Strap IR stretch nv nv                                                  Ther Activity                                       Gait Training                                       Modalities                                            Eval/Re-Eval POC Expires Auth #/ Referral # Total Visits Start Date Expiration Date Extension Info Visits Limitation   1/14 3/14 [employer] NO AUTH                                                                                     1 2 3 4 5 6    F          7 8 9 10 11 12                 13 14 15 16 17 18                 19 20 21 22 23 24                 25 26 27 28 29 30                    "

## 2025-01-28 ENCOUNTER — OFFICE VISIT (OUTPATIENT)
Dept: PHYSICAL THERAPY | Facility: MEDICAL CENTER | Age: 60
End: 2025-01-28
Payer: COMMERCIAL

## 2025-01-28 DIAGNOSIS — M75.21 BICEPS TENDONITIS ON RIGHT: Primary | ICD-10-CM

## 2025-01-28 PROCEDURE — 97140 MANUAL THERAPY 1/> REGIONS: CPT | Performed by: PHYSICAL THERAPIST

## 2025-01-28 PROCEDURE — 97110 THERAPEUTIC EXERCISES: CPT | Performed by: PHYSICAL THERAPIST

## 2025-01-28 NOTE — PROGRESS NOTES
"Daily Note     Today's date: 2025  Patient name: Penny J Clossey  : 1965  MRN: [id number]  Referring provider: Carolina Dominguez PA-C  Dx:   Encounter Diagnosis     ICD-10-CM    1. Biceps tendonitis on right  M75.21                        Subjective: Pt reports more soreness on busier days at work.  Also reports intermittent muscle spasm .  Today most soreness present in proximal bicep.      Objective: See treatment diary below      Assessment: Tolerated treatment well. Patient demonstrated fatigue post treatment, exhibited good technique with therapeutic exercises, and would benefit from continued PT  No sig increase pain w/ treatment today.    Plan: Continue per plan of care.      Precautions: none      Manuals          Demi R bicep/deltoid HEVER 15 min 15 min 10 min                                                Neuro Re-Ed             TB rows    nv         TB ext    nv         Supine active flex    nv         Sidelying ER    nv                                                Ther Ex          pulleys nv 5 min 5 min 5 min         Table slides flex and scap 10\" x10 ea 10\" x10 hep          Wall slides nv nv 10\" x10 10\" x10         Supine wand flex and scap nv 10\" x10 10\" x10 10\" x10         Strap IR stretch nv nv  10\" x10                                                Ther Activity                                       Gait Training                                       Modalities                                            Eval/Re-Eval POC Expires Auth #/ Referral # Total Visits Start Date Expiration Date Extension Info Visits Limitation   1/14 3/14 [employer] NO AUTH                                                                                     1 2 3 4 5 6    F        7 8 9 10 11 12                 13 14 15 16 17 18                 19 20 21 22 23 24                 25 26 27 28 29 30                    "

## 2025-01-30 ENCOUNTER — OFFICE VISIT (OUTPATIENT)
Dept: PHYSICAL THERAPY | Facility: MEDICAL CENTER | Age: 60
End: 2025-01-30
Payer: COMMERCIAL

## 2025-01-30 DIAGNOSIS — M75.21 BICEPS TENDONITIS ON RIGHT: Primary | ICD-10-CM

## 2025-01-30 PROCEDURE — 97112 NEUROMUSCULAR REEDUCATION: CPT | Performed by: PHYSICAL THERAPIST

## 2025-01-30 PROCEDURE — 97110 THERAPEUTIC EXERCISES: CPT | Performed by: PHYSICAL THERAPIST

## 2025-01-30 PROCEDURE — 97140 MANUAL THERAPY 1/> REGIONS: CPT | Performed by: PHYSICAL THERAPIST

## 2025-01-30 NOTE — PROGRESS NOTES
"Daily Note     Today's date: 2025  Patient name: Penny J Clossey  : 1965  MRN: [id number]  Referring provider: Carolina Dominguez PA-C  Dx:   Encounter Diagnosis     ICD-10-CM    1. Biceps tendonitis on right  M75.21                          Subjective: Pt reports mild soreness today in region of proximal bicep.      Objective: See treatment diary below      Assessment: Tolerated treatment well. Patient demonstrated fatigue post treatment, exhibited good technique with therapeutic exercises, and would benefit from continued PT  Initiated AROM and gentle strengthening today;  tolerated well.  Minimal restrictions only.    Plan: Continue per plan of care.      Precautions: none      Manuals         Demi SOLO bicep/deltoid HEVER 15 min 15 min 10 min 10 min                                               Neuro Re-Ed            TB rows    nv GTB 5\"x20        TB ext    nv GTB 5\"x20        Supine active flex    nv x20        Sidelying ER    nv x20        Sidelying abd     x20                                  Ther Ex         pulleys nv 5 min 5 min 5 min 5 min        Table slides flex and scap 10\" x10 ea 10\" x10 hep          Wall slides nv nv 10\" x10 10\" x10 10\" x10        Supine wand flex and scap nv 10\" x10 10\" x10 10\" x10         Strap IR stretch nv nv  10\" x10 10\" x10                                               Ther Activity                                       Gait Training                                       Modalities                                            Eval/Re-Eval POC Expires Auth #/ Referral # Total Visits Start Date Expiration Date Extension Info Visits Limitation   1/14 3/14 [employer] NO AUTH                                                                                     1 2 3 4 5 6    F      7 8 9 10 11 12                 13 14 15 16 17 18                 19 20 21 22 23 24                 25 26 27 28 29 " 30

## 2025-02-04 ENCOUNTER — OFFICE VISIT (OUTPATIENT)
Dept: PHYSICAL THERAPY | Facility: MEDICAL CENTER | Age: 60
End: 2025-02-04
Payer: COMMERCIAL

## 2025-02-04 DIAGNOSIS — M75.21 BICEPS TENDONITIS ON RIGHT: Primary | ICD-10-CM

## 2025-02-04 PROCEDURE — 97140 MANUAL THERAPY 1/> REGIONS: CPT | Performed by: PHYSICAL THERAPIST

## 2025-02-04 PROCEDURE — 97112 NEUROMUSCULAR REEDUCATION: CPT | Performed by: PHYSICAL THERAPIST

## 2025-02-04 PROCEDURE — 97110 THERAPEUTIC EXERCISES: CPT | Performed by: PHYSICAL THERAPIST

## 2025-02-04 NOTE — PROGRESS NOTES
"Daily Note     Today's date: 2025  Patient name: Penny J Clossey  : 1965  MRN: [id number]  Referring provider: Carolina Dominguez PA-C  Dx:   Encounter Diagnosis     ICD-10-CM    1. Biceps tendonitis on right  M75.21                          Subjective: Pt reports fluctuating pain; not always dependent on activity.  Today pain is located mostly in posterior shoulder.       Objective: See treatment diary below      Assessment: Tolerated treatment well. Patient demonstrated fatigue post treatment, exhibited good technique with therapeutic exercises, and would benefit from continued PT  Restrictions continue to decrease.     Plan: Continue per plan of care.      Precautions: none      Manuals  2/       [place] R bicep/deltoid HEVER 15 min 15 min 10 min 10 min 10 min                                              Neuro Re-Ed     2/4       TB rows    nv GTB 5\"x20 GTB 5\"x20       TB ext    nv GTB 5\"x20 GTB 5\"x20       Supine active flex    nv x20 x20       Sidelying ER    nv x20 x20       Sidelying abd     x20 x20                                 Ther Ex  2/4       pulleys nv 5 min 5 min 5 min 5 min 5 min       Table slides flex and scap 10\" x10 ea 10\" x10 hep          Wall slides nv nv 10\" x10 10\" x10 10\" x10 10\" x10       Supine wand flex and scap nv 10\" x10 10\" x10 10\" x10         Strap IR stretch nv nv  10\" x10 10\" x10 10\" x10                                              Ther Activity                                       Gait Training                                       Modalities                                            Eval/Re-Eval POC Expires Auth #/ Referral # Total Visits Start Date Expiration Date Extension Info Visits Limitation   1/14 3/14 [employer] NO AUTH                                                                                     1 2 3 4 5 6    F  2/4    7 8 9 10 11 12                 13 14 15 16 17 18 "                 19 20 21 22 23 24                 25 26 27 28 29 30

## 2025-02-06 ENCOUNTER — OFFICE VISIT (OUTPATIENT)
Dept: PHYSICAL THERAPY | Facility: MEDICAL CENTER | Age: 60
End: 2025-02-06
Payer: COMMERCIAL

## 2025-02-06 DIAGNOSIS — M75.21 BICEPS TENDONITIS ON RIGHT: Primary | ICD-10-CM

## 2025-02-06 PROCEDURE — 97140 MANUAL THERAPY 1/> REGIONS: CPT | Performed by: PHYSICAL THERAPIST

## 2025-02-06 PROCEDURE — 97112 NEUROMUSCULAR REEDUCATION: CPT | Performed by: PHYSICAL THERAPIST

## 2025-02-06 PROCEDURE — 97110 THERAPEUTIC EXERCISES: CPT | Performed by: PHYSICAL THERAPIST

## 2025-02-06 NOTE — PROGRESS NOTES
"Daily Note     Today's date: 2025  Patient name: Penny J Clossey  : 1965  MRN: [id number]  Referring provider: Carolina Dominguez PA-C  Dx:   Encounter Diagnosis     ICD-10-CM    1. Biceps tendonitis on right  M75.21                          Subjective: Pt reports feeling better today.   Mild pain in posterior shoulder.      Objective: See treatment diary below      Assessment: Tolerated treatment well. Patient demonstrated fatigue post treatment, exhibited good technique with therapeutic exercises, and would benefit from continued PT  Restrictions continue to decrease.     Plan: Continue per plan of care.      Precautions: none      Manuals  2/4 2/6      [place] R bicep/deltoid HEVER 15 min 15 min 10 min 10 min 10 min 10 min                                             Neuro Re-Ed     2/4 2/6      TB rows    nv GTB 5\"x20 GTB 5\"x20 GTB 5\"x20      TB ext    nv GTB 5\"x20 GTB 5\"x20 GTB 5\"x20      Supine active flex    nv x20 x20 x20      Sidelying ER    nv x20 x20 x20      Sidelying abd     x20 x20 x20                                Ther Ex  2/4 2/6      pulleys nv 5 min 5 min 5 min 5 min 5 min 5 min      Table slides flex and scap 10\" x10 ea 10\" x10 hep          Wall slides nv nv 10\" x10 10\" x10 10\" x10 10\" x10 10\" x10      Supine wand flex and scap nv 10\" x10 10\" x10 10\" x10         Strap IR stretch nv nv  10\" x10 10\" x10 10\" x10 10\" x10                                             Ther Activity                                       Gait Training                                       Modalities                                            Eval/Re-Eval POC Expires Auth #/ Referral # Total Visits Start Date Expiration Date Extension Info Visits Limitation   1/14 3/14 [employer] NO AUTH                                                                                     1 2 3 4 5 6    F  2/4    7 8 9 10 11 12   2/6              13 " 14 15 16 17 18                 19 20 21 22 23 24                 25 26 27 28 29 30

## 2025-02-11 ENCOUNTER — OFFICE VISIT (OUTPATIENT)
Dept: PHYSICAL THERAPY | Facility: MEDICAL CENTER | Age: 60
End: 2025-02-11
Payer: COMMERCIAL

## 2025-02-11 DIAGNOSIS — M75.21 BICEPS TENDONITIS ON RIGHT: ICD-10-CM

## 2025-02-11 DIAGNOSIS — M75.21 BICEPS TENDONITIS ON RIGHT: Primary | ICD-10-CM

## 2025-02-11 DIAGNOSIS — M75.81 ROTATOR CUFF TENDONITIS, RIGHT: Primary | ICD-10-CM

## 2025-02-11 PROCEDURE — 97112 NEUROMUSCULAR REEDUCATION: CPT | Performed by: PHYSICAL THERAPIST

## 2025-02-11 PROCEDURE — 97110 THERAPEUTIC EXERCISES: CPT | Performed by: PHYSICAL THERAPIST

## 2025-02-11 PROCEDURE — 97140 MANUAL THERAPY 1/> REGIONS: CPT | Performed by: PHYSICAL THERAPIST

## 2025-02-11 NOTE — PROGRESS NOTES
"Daily Note     Today's date: 2025  Patient name: Penny J Clossey  : 1965  MRN: [id number]  Referring provider: Carolina Dominguez PA-C  Dx:   Encounter Diagnosis     ICD-10-CM    1. Biceps tendonitis on right  M75.21                          Subjective: Pt reports increased pain and soreness today for unknown reason; mostly in posterior shoulder.  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  She is scheduled for MRI        Objective: See treatment diary below      Assessment: Tolerated treatment well. Patient demonstrated fatigue post treatment, exhibited good technique with therapeutic exercises, and would benefit from continued PT  Minimal restrictions present.    Plan: Continue per plan of care.      Precautions: none      Manuals  2/ 2/     [place] R bicep/deltoid HEVER 15 min 15 min 10 min 10 min 10 min 10 min 10 min                                            Neuro Re-Ed     2/ 2     TB rows    nv GTB 5\"x20 GTB 5\"x20 GTB 5\"x20 GTB 5\"x20     TB ext    nv GTB 5\"x20 GTB 5\"x20 GTB 5\"x20 GTB 5\"X20     Supine active flex    nv x20 x20 x20 x20     Sidelying ER    nv x20 x20 x20 x20     Sidelying abd     x20 x20 x20 x20                               Ther Ex  2/ 2/     pulleys nv 5 min 5 min 5 min 5 min 5 min 5 min 5 min     Table slides flex and scap 10\" x10 ea 10\" x10 hep          Wall slides nv nv 10\" x10 10\" x10 10\" x10 10\" x10 10\" x10 10\" x10     Supine wand flex and scap nv 10\" x10 10\" x10 10\" x10         Strap IR stretch nv nv  10\" x10 10\" x10 10\" x10 10\" x10 10\" x10                                            Ther Activity                                       Gait Training                                       Modalities                                            Eval/Re-Eval POC Expires Auth #/ Referral # Total Visits Start Date Expiration Date Extension Info Visits Limitation   1/14 3/14 " [employer] NO AUTH                                                                                     1 2 3 4 5 6   1/14 F 1/22 1/23 1/28 1/30 2/4    7 8 9 10 11 12   2/6 2/11           13 14 15 16 17 18                 19 20 21 22 23 24                 25 26 27 28 29 30

## 2025-02-13 ENCOUNTER — OFFICE VISIT (OUTPATIENT)
Dept: PHYSICAL THERAPY | Facility: MEDICAL CENTER | Age: 60
End: 2025-02-13
Payer: COMMERCIAL

## 2025-02-13 DIAGNOSIS — M75.21 BICEPS TENDONITIS ON RIGHT: Primary | ICD-10-CM

## 2025-02-13 PROCEDURE — 97110 THERAPEUTIC EXERCISES: CPT | Performed by: PHYSICAL THERAPIST

## 2025-02-13 PROCEDURE — 97112 NEUROMUSCULAR REEDUCATION: CPT | Performed by: PHYSICAL THERAPIST

## 2025-02-13 PROCEDURE — 97140 MANUAL THERAPY 1/> REGIONS: CPT | Performed by: PHYSICAL THERAPIST

## 2025-02-13 NOTE — PROGRESS NOTES
"Daily Note     Today's date: 2025  Patient name: Penny J Clossey  : 1965  MRN: [id number]  Referring provider: Carolina Dominguez PA-C  Dx:   Encounter Diagnosis     ICD-10-CM    1. Biceps tendonitis on right  M75.21                            Subjective: Pt feels better than she did last visit.  Mild pain only today.       Objective: See treatment diary below      Assessment: Tolerated treatment well. Patient demonstrated fatigue post treatment, exhibited good technique with therapeutic exercises, and would benefit from continued PT  Minimal restrictions present in posterior shoulder.    Plan: Continue per plan of care.      Precautions: none      Manuals  2/4 2/    [place]i SOLO bicep/deltoid HEVER 15 min 15 min 10 min 10 min 10 min 10 min 10 min 10 min                                           Neuro Re-Ed     2/4 2/6     TB rows    nv GTB 5\"x20 GTB 5\"x20 GTB 5\"x20 GTB 5\"x20 GTB 5\"x20    TB ext    nv GTB 5\"x20 GTB 5\"x20 GTB 5\"x20 GTB 5\"X20 GTB 5\"x20    Supine active flex    nv x20 x20 x20 x20 x20    Sidelying ER    nv x20 x20 x20 x20 x20    Sidelying abd     x20 x20 x20 x20 20                              Ther Ex  2/4 2/6     pulleys nv 5 min 5 min 5 min 5 min 5 min 5 min 5 min 5 min    Table slides flex and scap 10\" x10 ea 10\" x10 hep          Wall slides nv nv 10\" x10 10\" x10 10\" x10 10\" x10 10\" x10 10\" x10 10\" x10    Supine wand flex and scap nv 10\" x10 10\" x10 10\" x10         Strap IR stretch nv nv  10\" x10 10\" x10 10\" x10 10\" x10 10\" x10 10\" x10                                           Ther Activity                                       Gait Training                                       Modalities                                            Eval/Re-Eval POC Expires Auth #/ Referral # Total Visits Start Date Expiration Date Extension Info Visits Limitation   1/14 3/14 [facility] NO AUTH                                "                                                      1 2 3 4 5 6   1/14 F 1/22 1/23 1/28 1/30 2/4    7 8 9 10 11 12   2/6 2/11 2/13         13 14 15 16 17 18                 19 20 21 22 23 24                 25 26 27 28 29 30

## 2025-02-20 ENCOUNTER — APPOINTMENT (OUTPATIENT)
Dept: PHYSICAL THERAPY | Facility: MEDICAL CENTER | Age: 60
End: 2025-02-20
Payer: COMMERCIAL

## 2025-02-24 ENCOUNTER — HOSPITAL ENCOUNTER (OUTPATIENT)
Dept: MRI IMAGING | Facility: HOSPITAL | Age: 60
Discharge: HOME | End: 2025-02-24
Payer: COMMERCIAL

## 2025-02-24 DIAGNOSIS — M75.81 ROTATOR CUFF TENDONITIS, RIGHT: ICD-10-CM

## 2025-02-24 DIAGNOSIS — M75.21 BICEPS TENDONITIS ON RIGHT: ICD-10-CM

## 2025-02-24 PROCEDURE — 73221 MRI JOINT UPR EXTREM W/O DYE: CPT

## 2025-02-25 ENCOUNTER — APPOINTMENT (OUTPATIENT)
Dept: PHYSICAL THERAPY | Facility: MEDICAL CENTER | Age: 60
End: 2025-02-25
Payer: COMMERCIAL

## 2025-02-27 ENCOUNTER — APPOINTMENT (OUTPATIENT)
Dept: PHYSICAL THERAPY | Facility: MEDICAL CENTER | Age: 60
End: 2025-02-27
Payer: COMMERCIAL

## 2025-03-03 ENCOUNTER — OFFICE VISIT (OUTPATIENT)
Dept: OBGYN CLINIC | Facility: CLINIC | Age: 60
End: 2025-03-03
Payer: COMMERCIAL

## 2025-03-03 VITALS — BODY MASS INDEX: 29.16 KG/M2 | HEIGHT: 65 IN | WEIGHT: 175 LBS

## 2025-03-03 DIAGNOSIS — M75.111 INCOMPLETE ROTATOR CUFF TEAR OR RUPTURE OF RIGHT SHOULDER, NOT SPECIFIED AS TRAUMATIC: Primary | ICD-10-CM

## 2025-03-03 DIAGNOSIS — M75.21 BICEPS TENDONITIS ON RIGHT: ICD-10-CM

## 2025-03-03 PROCEDURE — 99214 OFFICE O/P EST MOD 30 MIN: CPT | Performed by: STUDENT IN AN ORGANIZED HEALTH CARE EDUCATION/TRAINING PROGRAM

## 2025-03-03 NOTE — PROGRESS NOTES
Ortho Sports Medicine Shoulder Follow Up Visit     Assesment:   59 y.o. female right shoulder partial-thickness rotator cuff tear and biceps tendinitis    Plan:    We had a lengthy discussion during which we reviewed her imaging, exam, diagnosis, and treatment options.  We discussed conservative treatments including physical therapy, activity modification, anti-inflammatories, RICE, and injections.  We also discussed surgical options.  After lengthy discussion, the patient elected to begin with a course of conservative treatment.  Pending will continue with physical therapy.  Her range of motion is excellent at this time and I did discuss the case with her physical therapist and recommended working on strengthening mostly moving forward of the rotator cuff as well as the periscapular stabilizers.  She will continue with over-the-counter NSAIDs and Tylenol as needed due to having GI upset with the meloxicam. The patient was offered a steroid injection however would like to hold off at this time.  They will follow-up in 6 weeks for clinical check.  If no improvement at that time, we will likely perform a steroid injection.  All of her questions were answered in detail. She was encouraged to reach out via [employer] if shehas any questions or concerns.     Conservative treatment:    Ice to shoulder 1-2 times daily, for 20 minutes at a time.  PT for ROM and strengthening to shoulder, rotator cuff, scapular stabilizers.  Home exercise program for shoulder, including ROM and strenthening.  Instructions given to patient of what exercises to perform.  Let pain guide return to activities.      Imaging:    All imaging from today was reviewed by myself and explained to the patient.       Injection:    May consider future corticosteroid injection depending on clinical exam/imaging.      Surgery:     No surgery is recommended at this point, continue with conservative treatment plan as noted.      Follow up:    Return in about 6  weeks (around 4/14/2025). No x-rays      Chief Complaint   Patient presents with    Right Shoulder - Follow-up         History of Present Illness:    The patient is returns for follow up of her right shoulder.  Since the prior visit, She reports some improvement.  She does continue to have pain especially with brushing her teeth and overhead activity, and pain localizes to her anterolateral as well as superior aspect of her shoulder.  Her range of motion has improved excellently, and she has been working with a chiropractor was done active release manual therapy which she states helped a lot.  She states that physical therapy has been leaving her sore but that they have been working on stretching and her range of motion has improved.    Pain is improved by rest, ice, NSAIDS, and chiropractor manual therapy.  Pain is aggravated by overhead activity, reaching back, and lifting .    Symptoms include pain.    The patient has weakness.       The patient has tried rest, ice, and NSAIDS.    She recently obtained an MRI and is here to review the results of that.  No history of shoulder injections.    Shoulder Surgical History:  None    Past Medical, Social and Family History:  Past Medical History:   Diagnosis Date    Abnormal Pap smear of cervix 1989    all normal since    Asthma     Carbon monoxide exposure 02/06/2018    Chronic back pain     Hashimoto's thyroiditis 01/09/2023    Lump of skin     last assessed 11/21/13    Migraine     Sinus bradycardia     last assessed 10/25/16    Varicella      Past Surgical History:   Procedure Laterality Date    APPENDECTOMY      BREAST CYST EXCISION Right 2001    benign    COLONOSCOPY      TONSILLECTOMY      VARICOSE VEIN SURGERY      WISDOM TOOTH EXTRACTION Bilateral      Allergies   Allergen Reactions    Penicillins     Doxycycline     Erythromycin     Other      Mushrooms     Current Outpatient Medications on File Prior to Visit   Medication Sig Dispense Refill    aspirin 81 mg  chewable tablet Chew 81 mg daily      colchicine (COLCRYS) 0.6 mg tablet Take 1 tablet (0.6 mg total) by mouth daily 90 tablet 3    fluticasone (FLONASE) 50 mcg/act nasal spray 2 sprays into each nostril daily 16 mL 1    INOSITOL PO Take by mouth      ketorolac (TORADOL) 10 mg tablet TAKE 1 TABLET EVERY 6 HOURS AS NEEDED FOR MIGRAINE (WITH COMPAZINE IF NEEDED). MAX 2/DAY & 3/WEEK. 30 tablet 0    losartan (COZAAR) 25 mg tablet Take 1 tablet (25 mg total) by mouth daily 90 tablet 3    Magnesium 200 MG TABS Take 200 mg by mouth 2 (two) times a day      meclizine (ANTIVERT) 12.5 MG tablet 1-2 tabs TID prn dizziness 60 tablet 2    meloxicam (Mobic) 15 mg tablet Take 1 tablet (15 mg total) by mouth daily 30 tablet 2    metoprolol succinate (TOPROL-XL) 25 mg 24 hr tablet Take 1 tablet (25 mg total) by mouth daily 90 tablet 3    MULTIPLE VITAMINS ESSENTIAL PO Take by mouth      other medication, see sig, Medication/product name: estradiol 1 mg/testosterone 12 mg /ml cream  Strength: as above  Sig (include dose, route, frequency): apply 0.5 ml to labia daily 15 mg 5    patient supplied medication  peptide      prochlorperazine (COMPAZINE) 10 mg tablet Take 1 tablet (10 mg total) by mouth every 8 (eight) hours as needed for nausea or vomiting 30 tablet 0    Progesterone 100 MG CAPS TAKE 100 MG BY MOUTH AT BEDTIME 30 capsule 11    rimegepant sulfate (NURTEC) 75 mg TBDP 1 tab q other day. No more than one dose per 24 hours. 16 tablet 11    dexamethasone (DECADRON) 2 mg tablet 1 tab qam with food prn migraine. (Patient not taking: Reported on 9/23/2024) 5 tablet 0    levothyroxine 50 mcg tablet TAKE 1 TABLET BY MOUTH EVERY DAY (Patient not taking: Reported on 9/23/2024) 30 tablet 11     No current facility-administered medications on file prior to visit.     Social History     Socioeconomic History    Marital status: /Civil Union     Spouse name: Not on file    Number of children: Not on file    Years of education:  "Not on file    Highest education level: Not on file   Occupational History    Not on file   Tobacco Use    Smoking status: Former     Current packs/day: 0.00     Types: Cigarettes     Quit date:      Years since quittin.1    Smokeless tobacco: Never   Vaping Use    Vaping status: Never Used   Substance and Sexual Activity    Alcohol use: Yes     Alcohol/week: 7.0 standard drinks of alcohol     Types: 7 Glasses of wine per week     Comment: social per [employer]    Drug use: No    Sexual activity: Yes     Partners: Male   Other Topics Concern    Not on file   Social History Narrative    Caffeine use    Exercises 6 times a week     Social Drivers of Health     Financial Resource Strain: Not on file   Food Insecurity: Not on file   Transportation Needs: Not on file   Physical Activity: Not on file   Stress: Not on file   Social Connections: Not on file   Intimate Partner Violence: Not on file   Housing Stability: Not on file       I have reviewed the past medical, surgical, social and family history, medications and allergies as documented in the EMR.    Review of systems: ROS is negative other than that noted in the HPI.  Constitutional: Negative for fatigue and fever.      Physical Exam:    Height 5' 5\" (1.651 m), weight 79.4 kg (175 lb), not currently breastfeeding.    General/Constitutional: NAD, well developed, well nourished  HENT: Normocephalic, atraumatic  CV: Intact distal pulses, regular rate  Resp: No respiratory distress or labored breathing  GI: Soft and non-tender   Lymphatic: No lymphadenopathy palpated  Neuro: Alert and Oriented x 3, no focal deficits  Psych: Normal mood, normal affect, normal judgement, normal behavior  Skin: Warm, dry, no rashes, no erythema      Shoulder focused exam:       RIGHT LEFT    Scapula Atrophy Negative Negative     Winging Negative Negative     Protraction Negative Negative    Rotator cuff SS 4/5 5/5     IS 4/5 5/5     SubS 5/5 5/5    ROM     170     ER0 50 " 50     ER90 90    90     IR90 60    60     IRb L5    L3    TTP: AC Negative Negative     Biceps Negative Negative     Coracoid Negative Negative    Special Tests: O'Briens Positive Negative     Cross body Adduction Negative Negative     Speeds  Positive Negative     Glenn's Positive Negative     Neer Positive Negative     Johnson Positive Negative    Instability: Apprehension & relocation not tested not tested     Load & shift not tested not tested               UE NV Exam: +2 Radial pulses bilaterally  Sensation intact to light touch C5-T1 bilaterally, Radial/median/ulnar nerve motor intact    Cervical ROM is full without pain, numbness or tingling      Shoulder Imaging    I personally reviewed and interpreted MRI of the right shoulder obtained on 2/24/2025 which I reviewed in detail with the patient.  This demonstrates small partial thickness articular sided tear of the supraspinatus.  No complete tear.  Some mild fluid in the subacromial bursa.  Minimal glenohumeral degenerative changes.  There is some fluid around the biceps tendon on the T2 axial cuts.  No acute fracture.  I reviewed the radiology report and agree with their findings      Scribe Attestation      I,:   am acting as a scribe while in the presence of the attending physician.:       I,:   personally performed the services described in this documentation    as scribed in my presence.:

## 2025-03-12 ENCOUNTER — OFFICE VISIT (OUTPATIENT)
Dept: CARDIOLOGY CLINIC | Facility: MEDICAL CENTER | Age: 60
End: 2025-03-12
Payer: COMMERCIAL

## 2025-03-12 VITALS
WEIGHT: 172 LBS | BODY MASS INDEX: 28.66 KG/M2 | OXYGEN SATURATION: 100 % | DIASTOLIC BLOOD PRESSURE: 78 MMHG | HEIGHT: 65 IN | HEART RATE: 70 BPM | SYSTOLIC BLOOD PRESSURE: 146 MMHG

## 2025-03-12 DIAGNOSIS — I31.9 CHRONIC IDIOPATHIC PERICARDITIS, UNSPECIFIED COMPLICATION STATUS: ICD-10-CM

## 2025-03-12 DIAGNOSIS — I49.1 PAC (PREMATURE ATRIAL CONTRACTION): Primary | ICD-10-CM

## 2025-03-12 DIAGNOSIS — R00.2 PALPITATIONS: ICD-10-CM

## 2025-03-12 DIAGNOSIS — I10 ESSENTIAL HYPERTENSION: ICD-10-CM

## 2025-03-12 DIAGNOSIS — E78.00 HYPERCHOLESTEROLEMIA: ICD-10-CM

## 2025-03-12 PROCEDURE — 99214 OFFICE O/P EST MOD 30 MIN: CPT | Performed by: INTERNAL MEDICINE

## 2025-03-12 RX ORDER — METOPROLOL SUCCINATE 50 MG/1
50 TABLET, EXTENDED RELEASE ORAL DAILY
Qty: 90 TABLET | Refills: 3 | Status: SHIPPED | OUTPATIENT
Start: 2025-03-12

## 2025-03-12 RX ORDER — LOSARTAN POTASSIUM 25 MG/1
25 TABLET ORAL DAILY
Qty: 90 TABLET | Refills: 3 | Status: SHIPPED | OUTPATIENT
Start: 2025-03-12

## 2025-03-12 NOTE — PROGRESS NOTES
Cardiology   Penny J Clossey 59 y.o. female MRN: [id number]        Impression:  1. Hypertension - Improved control.    2. Dyslipidemia - High LDL and HDL.    3. Palpitations - stable. Slowly improving.   4. Chest tightness - likely pericarditis. stable.      Recommendations:  1. Increase metoprolol succinate to 50mg daily.   2 Continue remainder of medications.  3. Check fasting lipid panel and complete metabolic profile.   4. Follow up in 6 months.            HPI: Penny J Clossey is a 59 y.o. year old female with hypertension, dyslipidemia, premature atrial contractions, and migraines, who presents for follow up.  Echo demonstrated normal cardiac function with mild mitral regurgitation, and holter demonstrated no dysrhythmias.  Started on oral estrogen with increase in BP.  Also with chest pain when lying flat.  Recently diagnosed with Hashimoto's thyroiditis. [place] monitor 7/24 - frequent premature atrial contractions.         Review of Systems   Constitutional: Negative.    HENT: Negative.     Eyes: Negative.    Respiratory:  Negative for chest tightness and shortness of breath.    Cardiovascular:  Positive for chest pain and palpitations. Negative for leg swelling.   Gastrointestinal: Negative.    Endocrine: Negative.    Genitourinary: Negative.    Musculoskeletal: Negative.    Skin: Negative.    Allergic/Immunologic: Negative.    Neurological: Negative.    Hematological: Negative.    Psychiatric/Behavioral: Negative.     All other systems reviewed and are negative.        Past Medical History:   Diagnosis Date    Abnormal Pap smear of cervix 1989    all normal since    Asthma     Carbon monoxide exposure 02/06/2018    Chronic back pain     Hashimoto's thyroiditis 01/09/2023    Lump of skin     last assessed 11/21/13    Migraine     Sinus bradycardia     last assessed 10/25/16    Varicella      Past Surgical History:   Procedure Laterality Date    APPENDECTOMY      BREAST CYST EXCISION Right 2001    benign     COLONOSCOPY      TONSILLECTOMY      VARICOSE VEIN SURGERY      WISDOM TOOTH EXTRACTION Bilateral      Social History     Substance and Sexual Activity   Alcohol Use Yes    Alcohol/week: 7.0 standard drinks of alcohol    Types: 7 Glasses of wine per week    Comment: social per [employer]     Social History     Substance and Sexual Activity   Drug Use No     Social History     Tobacco Use   Smoking Status Former    Current packs/day: 0.00    Types: Cigarettes    Quit date:     Years since quittin.2   Smokeless Tobacco Never     Family History   Problem Relation Age of Onset    Heart failure Mother         CHF    Heart attack Father     Colon cancer Father 50    Heart disease Sister     No Known Problems Sister     Hypertension Brother     Stroke Brother     No Known Problems Maternal Grandmother     Lung cancer Maternal Grandfather     No Known Problems Paternal Grandmother     No Known Problems Paternal Grandfather     No Known Problems Son     No Known Problems Son     No Known Problems Son     Diabetes Maternal Aunt     Stroke Maternal Aunt     No Known Problems Paternal Aunt     No Known Problems Paternal Aunt     No Known Problems Paternal Aunt     Arthritis Family     Breast cancer Neg Hx        Allergies:  Allergies   Allergen Reactions    Penicillins     Doxycycline     Erythromycin     Other      Mushrooms       Medications:     Current Outpatient Medications:     aspirin 81 mg chewable tablet, Chew 81 mg daily, Disp: , Rfl:     colchicine (COLCRYS) 0.6 mg tablet, Take 1 tablet (0.6 mg total) by mouth daily, Disp: 90 tablet, Rfl: 3    fluticasone (FLONASE) 50 mcg/act nasal spray, 2 sprays into each nostril daily, Disp: 16 mL, Rfl: 1    INOSITOL PO, Take by mouth, Disp: , Rfl:     ketorolac (TORADOL) 10 mg tablet, TAKE 1 TABLET EVERY 6 HOURS AS NEEDED FOR MIGRAINE (WITH COMPAZINE IF NEEDED). MAX 2/DAY & 3/WEEK., Disp: 30 tablet, Rfl: 0    losartan (COZAAR) 25 mg tablet, Take 1 tablet (25 mg total) by  mouth daily, Disp: 90 tablet, Rfl: 3    Magnesium 200 MG TABS, Take 200 mg by mouth 2 (two) times a day, Disp: , Rfl:     meclizine (ANTIVERT) 12.5 MG tablet, 1-2 tabs TID prn dizziness, Disp: 60 tablet, Rfl: 2    metoprolol succinate (TOPROL-XL) 25 mg 24 hr tablet, Take 1 tablet (25 mg total) by mouth daily, Disp: 90 tablet, Rfl: 3    MULTIPLE VITAMINS ESSENTIAL PO, Take by mouth, Disp: , Rfl:     other medication, see sig,, Medication/product name: estradiol 1 mg/testosterone 12 mg /ml cream Strength: as above Sig (include dose, route, frequency): apply 0.5 ml to labia daily, Disp: 15 mg, Rfl: 5    patient supplied medication,  peptide, Disp: , Rfl:     prochlorperazine (COMPAZINE) 10 mg tablet, Take 1 tablet (10 mg total) by mouth every 8 (eight) hours as needed for nausea or vomiting, Disp: 30 tablet, Rfl: 0    Progesterone 100 MG CAPS, TAKE 100 MG BY MOUTH AT BEDTIME, Disp: 30 capsule, Rfl: 11    rimegepant sulfate (NURTEC) 75 mg TBDP, 1 tab q other day. No more than one dose per 24 hours., Disp: 16 tablet, Rfl: 11    dexamethasone (DECADRON) 2 mg tablet, 1 tab qam with food prn migraine. (Patient not taking: Reported on 9/23/2024), Disp: 5 tablet, Rfl: 0    levothyroxine 50 mcg tablet, TAKE 1 TABLET BY MOUTH EVERY DAY (Patient not taking: Reported on 9/23/2024), Disp: 30 tablet, Rfl: 11    meloxicam (Mobic) 15 mg tablet, Take 1 tablet (15 mg total) by mouth daily, Disp: 30 tablet, Rfl: 2      Wt Readings from Last 3 Encounters:   03/12/25 78 kg (172 lb)   03/03/25 79.4 kg (175 lb)   01/09/25 79.5 kg (175 lb 3.2 oz)     Temp Readings from Last 3 Encounters:   10/04/23 98.4 °F (36.9 °C) (Temporal)   09/13/23 98.6 °F (37 °C) (Temporal)   07/05/23 97.5 °F (36.4 °C) (Temporal)     BP Readings from Last 3 Encounters:   03/12/25 146/78   10/15/24 138/80   09/23/24 145/69     Pulse Readings from Last 3 Encounters:   03/12/25 70   10/15/24 74   09/23/24 67         Physical Exam  HENT:      Head: Atraumatic.       "Mouth/Throat:      Mouth: Mucous membranes are moist.   Eyes:      Extraocular Movements: Extraocular movements intact.   Cardiovascular:      Rate and Rhythm: Normal rate and regular rhythm.      Heart sounds: Normal heart sounds.   Pulmonary:      Effort: Pulmonary effort is normal.      Breath sounds: Normal breath sounds.   Abdominal:      General: Abdomen is flat.   Musculoskeletal:         General: Normal range of motion.      Cervical back: Normal range of motion.   Skin:     General: Skin is warm.   Neurological:      General: No focal deficit present.      Mental Status: She is alert and oriented to person, place, and time.   Psychiatric:         Mood and Affect: Mood normal.         Behavior: Behavior normal.           Laboratory Studies:  CMP:  Lab Results   Component Value Date     12/18/2015    K 4.6 02/12/2022     02/12/2022    CO2 30 02/12/2022    ANIONGAP 10 12/18/2015    BUN 14 02/12/2022    CREATININE 0.83 02/12/2022    GLUCOSE 92 12/18/2015    AST 19 03/11/2021    ALT 31 03/11/2021    BILITOT 0.64 12/18/2015    EGFR 79 02/12/2022       Lipid Profile:   No results found for: \"CHOL\"  Lab Results   Component Value Date    HDL 85 02/12/2022     Lab Results   Component Value Date    LDLCALC 154 (H) 02/12/2022     Lab Results   Component Value Date    TRIG 89 02/12/2022               "

## 2025-03-12 NOTE — PATIENT INSTRUCTIONS
Recommendations:  1. Increase metoprolol succinate to 50mg daily.   2 Continue remainder of medications.  3. Check fasting lipid panel and complete metabolic profile.   4. Follow up in 6 months.

## 2025-04-14 ENCOUNTER — OFFICE VISIT (OUTPATIENT)
Dept: OBGYN CLINIC | Facility: CLINIC | Age: 60
End: 2025-04-14
Payer: COMMERCIAL

## 2025-04-14 VITALS — WEIGHT: 174 LBS | HEIGHT: 65 IN | BODY MASS INDEX: 28.99 KG/M2

## 2025-04-14 DIAGNOSIS — M75.21 BICEPS TENDONITIS ON RIGHT: ICD-10-CM

## 2025-04-14 DIAGNOSIS — M75.111 INCOMPLETE ROTATOR CUFF TEAR OR RUPTURE OF RIGHT SHOULDER, NOT SPECIFIED AS TRAUMATIC: Primary | ICD-10-CM

## 2025-04-14 PROCEDURE — 99213 OFFICE O/P EST LOW 20 MIN: CPT | Performed by: STUDENT IN AN ORGANIZED HEALTH CARE EDUCATION/TRAINING PROGRAM

## 2025-04-14 PROCEDURE — 20610 DRAIN/INJ JOINT/BURSA W/O US: CPT | Performed by: STUDENT IN AN ORGANIZED HEALTH CARE EDUCATION/TRAINING PROGRAM

## 2025-04-14 RX ORDER — LIDOCAINE HYDROCHLORIDE 10 MG/ML
2 INJECTION, SOLUTION INFILTRATION; PERINEURAL
Status: COMPLETED | OUTPATIENT
Start: 2025-04-14 | End: 2025-04-14

## 2025-04-14 RX ORDER — METHYLPREDNISOLONE ACETATE 40 MG/ML
2 INJECTION, SUSPENSION INTRA-ARTICULAR; INTRALESIONAL; INTRAMUSCULAR; SOFT TISSUE
Status: COMPLETED | OUTPATIENT
Start: 2025-04-14 | End: 2025-04-14

## 2025-04-14 RX ORDER — BUPIVACAINE HYDROCHLORIDE 2.5 MG/ML
2 INJECTION, SOLUTION INFILTRATION; PERINEURAL
Status: COMPLETED | OUTPATIENT
Start: 2025-04-14 | End: 2025-04-14

## 2025-04-14 RX ADMIN — BUPIVACAINE HYDROCHLORIDE 2 ML: 2.5 INJECTION, SOLUTION INFILTRATION; PERINEURAL at 09:45

## 2025-04-14 RX ADMIN — LIDOCAINE HYDROCHLORIDE 2 ML: 10 INJECTION, SOLUTION INFILTRATION; PERINEURAL at 09:45

## 2025-04-14 RX ADMIN — METHYLPREDNISOLONE ACETATE 2 ML: 40 INJECTION, SUSPENSION INTRA-ARTICULAR; INTRALESIONAL; INTRAMUSCULAR; SOFT TISSUE at 09:45

## 2025-04-14 NOTE — PROGRESS NOTES
Ortho Sports Medicine Shoulder Follow Up Visit     Assesment:   59 y.o. female  right shoulder partial-thickness rotator cuff tear and biceps tendinitis     Assessment & Plan  Incomplete rotator cuff tear or rupture of right shoulder, not specified as traumatic  We reviewed their current history, physical exam, and imaging in detail today with the patient.  We discussed continuing her home exercise program to work on strengthening at this time.  The risks and benefits of a corticosteroid injection were reviewed with the patient in detail today.  The patient elected to proceed with this injection in office today.  This was performed without complication and tolerated well by the patient.  We reviewed that there are multiple locations to do a corticosteroid injection in the shoulder, if she gets some relief from this injection, but not total relief we will discuss other injections.  She is continuing to progress in a positive direction.  All of her questions were answered today, she is agreeable to this plan.  She will follow-up in clinic in 6 weeks for reevaluation without x-ray.    Orders:    Large joint arthrocentesis: R subacromial bursa    Biceps tendonitis on right              Conservative treatment:    Ice to shoulder 1-2 times daily, for 20 minutes at a time.  PT for ROM and strengthening to shoulder, rotator cuff, scapular stabilizers.      Imaging:    All imaging from today was reviewed by myself and explained to the patient.       Injection:    No Injection planned at this time.  The risks and benefits of the injection (which include but are not limited to: infection, bleeding,damage to nerve/artery, need for further intervention), as well as the risks and benefits of all alternative treatments were explained and understood.  The patient elected to proceed with injection. The procedure was done with aseptic technique, and the patient tolerated the procedure well with no complications.  A corticosteroid  injection of the subacromial space was performed.      Surgery:     No surgery is recommended at this point, continue with conservative treatment plan as noted.      Follow up:    Return in about 6 weeks (around 5/26/2025).      Chief Complaint   Patient presents with    Right Shoulder - Follow-up         History of Present Illness:    The patient is returns for follow up of right shoulder partial-thickness rotator cuff tear and biceps tendinitis .  Since the prior visit, She reports that she is continuing to progress at this time. She did go to PT and reports that this has made her worse. She has been going to a chiropractor and doing active release therapy and reports that this is going well. She also reports that she has been doing exercises at home. She is continuing to get better at this time. When she has a break from work she reports that her pain is less, she works as a .     Shoulder Surgical History:  None    Past Medical, Social and Family History:  Past Medical History:   Diagnosis Date    Abnormal Pap smear of cervix 1989    all normal since    Asthma     Carbon monoxide exposure 02/06/2018    Chronic back pain     Hashimoto's thyroiditis 01/09/2023    Lump of skin     last assessed 11/21/13    Migraine     Sinus bradycardia     last assessed 10/25/16    Varicella      Past Surgical History:   Procedure Laterality Date    APPENDECTOMY      BREAST CYST EXCISION Right 2001    benign    COLONOSCOPY      TONSILLECTOMY      VARICOSE VEIN SURGERY      WISDOM TOOTH EXTRACTION Bilateral      Allergies   Allergen Reactions    Penicillins     Doxycycline     Erythromycin     Other      Mushrooms     Current Outpatient Medications on File Prior to Visit   Medication Sig Dispense Refill    aspirin 81 mg chewable tablet Chew 81 mg daily      colchicine (COLCRYS) 0.6 mg tablet Take 1 tablet (0.6 mg total) by mouth daily 90 tablet 3    dexamethasone (DECADRON) 2 mg tablet 1 tab qam with food prn migraine.  (Patient not taking: Reported on 9/23/2024) 5 tablet 0    fluticasone (FLONASE) 50 mcg/act nasal spray 2 sprays into each nostril daily 16 mL 1    INOSITOL PO Take by mouth      ketorolac (TORADOL) 10 mg tablet TAKE 1 TABLET EVERY 6 HOURS AS NEEDED FOR MIGRAINE (WITH COMPAZINE IF NEEDED). MAX 2/DAY & 3/WEEK. 30 tablet 0    levothyroxine 50 mcg tablet TAKE 1 TABLET BY MOUTH EVERY DAY (Patient not taking: Reported on 9/23/2024) 30 tablet 11    losartan (COZAAR) 25 mg tablet Take 1 tablet (25 mg total) by mouth daily 90 tablet 3    Magnesium 200 MG TABS Take 200 mg by mouth 2 (two) times a day      meclizine (ANTIVERT) 12.5 MG tablet 1-2 tabs TID prn dizziness 60 tablet 2    meloxicam (Mobic) 15 mg tablet Take 1 tablet (15 mg total) by mouth daily 30 tablet 2    metoprolol succinate (TOPROL-XL) 50 mg 24 hr tablet Take 1 tablet (50 mg total) by mouth daily 90 tablet 3    MULTIPLE VITAMINS ESSENTIAL PO Take by mouth      other medication, see sig, Medication/product name: estradiol 1 mg/testosterone 12 mg /ml cream  Strength: as above  Sig (include dose, route, frequency): apply 0.5 ml to labia daily 15 mg 5    patient supplied medication  peptide      prochlorperazine (COMPAZINE) 10 mg tablet Take 1 tablet (10 mg total) by mouth every 8 (eight) hours as needed for nausea or vomiting 30 tablet 0    Progesterone 100 MG CAPS TAKE 100 MG BY MOUTH AT BEDTIME 30 capsule 11    rimegepant sulfate (NURTEC) 75 mg TBDP 1 tab q other day. No more than one dose per 24 hours. 16 tablet 11     No current facility-administered medications on file prior to visit.     Social History     Socioeconomic History    Marital status: /Civil Union     Spouse name: Not on file    Number of children: Not on file    Years of education: Not on file    Highest education level: Not on file   Occupational History    Not on file   Tobacco Use    Smoking status: Former     Current packs/day: 0.00     Types: Cigarettes     Quit date: 1998     " Years since quittin.3    Smokeless tobacco: Never   Vaping Use    Vaping status: Never Used   Substance and Sexual Activity    Alcohol use: Yes     Alcohol/week: 7.0 standard drinks of alcohol     Types: 7 Glasses of wine per week     Comment: social per [employer]    Drug use: No    Sexual activity: Yes     Partners: Male   Other Topics Concern    Not on file   Social History Narrative    Caffeine use    Exercises 6 times a week     Social Drivers of Health     Financial Resource Strain: Not on file   Food Insecurity: Not on file   Transportation Needs: Not on file   Physical Activity: Not on file   Stress: Not on file   Social Connections: Not on file   Intimate Partner Violence: Not on file   Housing Stability: Not on file       I have reviewed the past medical, surgical, social and family history, medications and allergies as documented in the EMR.    Review of systems: ROS is negative other than that noted in the HPI.  Constitutional: Negative for fatigue and fever.      Physical Exam:    Height 5' 5\" (1.651 m), weight 78.9 kg (174 lb), not currently breastfeeding.    General/Constitutional: NAD, well developed, well nourished  HENT: Normocephalic, atraumatic  CV: Intact distal pulses, regular rate  Resp: No respiratory distress or labored breathing  GI: Soft and non-tender   Lymphatic: No lymphadenopathy palpated  Neuro: Alert and Oriented x 3, no focal deficits  Psych: Normal mood, normal affect, normal judgement, normal behavior  Skin: Warm, dry, no rashes, no erythema      Shoulder focused exam:         RIGHT LEFT     Scapula Atrophy Negative Negative       Winging Negative Negative       Protraction Negative Negative     Rotator cuff SS 4/5 5/5       IS 4/5 5/5       SubS 5/5 5/5     ROM     170       ER0 50 50       ER90 90    90       IR90 60    60       IRb L5    L3     TTP: AC Negative Negative       Biceps Negative Negative       Coracoid Negative Negative     Special Tests: O'Briens " "Positive Negative       Cross body Adduction Negative Negative       Speeds  Positive Negative       Glenn's Positive Negative       Neer Positive Negative       Johnson Positive Negative     Instability: Apprehension & relocation not tested not tested       Load & shift not tested not tested        UE NV Exam: +2 Radial pulses bilaterally  Sensation intact to light touch C5-T1 bilaterally, Radial/median/ulnar nerve motor intact    Cervical ROM is full without pain, numbness or tingling      Shoulder Imaging    No imaging was performed today    Large joint arthrocentesis: R subacromial bursa  [place] Protocol:  procedure performed by consultantConsent: Verbal consent obtained. Written consent not obtained.  Risks and benefits: risks, benefits and alternatives were discussed  Consent given by: patient  Time out: Immediately prior to procedure a \"time out\" was called to verify the correct patient, procedure, equipment, support staff and site/side marked as required.  Timeout called at: 4/14/2025 10:29 AM.  Patient understanding: patient states understanding of the procedure being performed  Site marked: the operative site was marked  Required items: required blood products, implants, devices, and special equipment available  Patient identity confirmed: verbally with patient  Supporting Documentation  Indications: pain   Procedure Details  Location: shoulder - R subacromial bursa  Preparation: Patient was prepped and draped in the usual sterile fashion  Needle size: 22 G  Ultrasound guidance: no  Approach: posterolateral  Medications administered: 2 mL bupivacaine 0.25 %; 2 mL lidocaine 1 %; 2 mL methylPREDNISolone acetate 40 mg/mL    Patient tolerance: patient tolerated the procedure well with no immediate complications  Dressing:  Sterile dressing applied             Scribe Attestation      I,:  Lisa Allan am acting as a scribe while in the presence of the attending physician.:       I,:  Jerrod Murrieta MD " personally performed the services described in this documentation    as scribed in my presence.:

## 2025-05-04 LAB
ALBUMIN SERPL-MCNC: 4.2 G/DL (ref 3.6–5.1)
ALBUMIN/GLOB SERPL: 2 (CALC) (ref 1–2.5)
ALP SERPL-CCNC: 50 U/L (ref 37–153)
ALT SERPL-CCNC: 18 U/L (ref 6–29)
AST SERPL-CCNC: 16 U/L (ref 10–35)
BILIRUB SERPL-MCNC: 0.7 MG/DL (ref 0.2–1.2)
BUN SERPL-MCNC: 17 MG/DL (ref 7–25)
BUN/CREAT SERPL: NORMAL (CALC) (ref 6–22)
CALCIUM SERPL-MCNC: 9.5 MG/DL (ref 8.6–10.4)
CHLORIDE SERPL-SCNC: 108 MMOL/L (ref 98–110)
CHOLEST SERPL-MCNC: 243 MG/DL
CHOLEST/HDLC SERPL: 3.8 (CALC)
CO2 SERPL-SCNC: 26 MMOL/L (ref 20–32)
CREAT SERPL-MCNC: 0.73 MG/DL (ref 0.5–1.03)
GFR/BSA.PRED SERPLBLD CYS-BASED-ARV: 95 ML/MIN/1.73M2
GLOBULIN SER CALC-MCNC: 2.1 G/DL (CALC) (ref 1.9–3.7)
GLUCOSE SERPL-MCNC: 99 MG/DL (ref 65–99)
HDLC SERPL-MCNC: 64 MG/DL
LDLC SERPL CALC-MCNC: 153 MG/DL (CALC)
NONHDLC SERPL-MCNC: 179 MG/DL (CALC)
POTASSIUM SERPL-SCNC: 4 MMOL/L (ref 3.5–5.3)
PROT SERPL-MCNC: 6.3 G/DL (ref 6.1–8.1)
SODIUM SERPL-SCNC: 141 MMOL/L (ref 135–146)
TRIGL SERPL-MCNC: 132 MG/DL

## 2025-05-27 ENCOUNTER — OFFICE VISIT (OUTPATIENT)
Dept: OBGYN CLINIC | Facility: CLINIC | Age: 60
End: 2025-05-27
Payer: COMMERCIAL

## 2025-05-27 VITALS — HEIGHT: 65 IN | BODY MASS INDEX: 28.99 KG/M2 | WEIGHT: 174 LBS

## 2025-05-27 DIAGNOSIS — M75.111 INCOMPLETE ROTATOR CUFF TEAR OR RUPTURE OF RIGHT SHOULDER, NOT SPECIFIED AS TRAUMATIC: ICD-10-CM

## 2025-05-27 DIAGNOSIS — M75.21 BICEPS TENDONITIS ON RIGHT: Primary | ICD-10-CM

## 2025-05-27 PROCEDURE — 20611 DRAIN/INJ JOINT/BURSA W/US: CPT | Performed by: STUDENT IN AN ORGANIZED HEALTH CARE EDUCATION/TRAINING PROGRAM

## 2025-05-27 PROCEDURE — 99213 OFFICE O/P EST LOW 20 MIN: CPT | Performed by: STUDENT IN AN ORGANIZED HEALTH CARE EDUCATION/TRAINING PROGRAM

## 2025-05-27 RX ORDER — METHYLPREDNISOLONE ACETATE 40 MG/ML
1 INJECTION, SUSPENSION INTRA-ARTICULAR; INTRALESIONAL; INTRAMUSCULAR; SOFT TISSUE
Status: COMPLETED | OUTPATIENT
Start: 2025-05-27 | End: 2025-05-27

## 2025-05-27 RX ORDER — LIDOCAINE HYDROCHLORIDE 10 MG/ML
1 INJECTION, SOLUTION INFILTRATION; PERINEURAL
Status: COMPLETED | OUTPATIENT
Start: 2025-05-27 | End: 2025-05-27

## 2025-05-27 RX ORDER — BUPIVACAINE HYDROCHLORIDE 2.5 MG/ML
1 INJECTION, SOLUTION INFILTRATION; PERINEURAL
Status: COMPLETED | OUTPATIENT
Start: 2025-05-27 | End: 2025-05-27

## 2025-05-27 RX ADMIN — LIDOCAINE HYDROCHLORIDE 1 ML: 10 INJECTION, SOLUTION INFILTRATION; PERINEURAL at 10:30

## 2025-05-27 RX ADMIN — BUPIVACAINE HYDROCHLORIDE 1 ML: 2.5 INJECTION, SOLUTION INFILTRATION; PERINEURAL at 10:30

## 2025-05-27 RX ADMIN — METHYLPREDNISOLONE ACETATE 1 ML: 40 INJECTION, SUSPENSION INTRA-ARTICULAR; INTRALESIONAL; INTRAMUSCULAR; SOFT TISSUE at 10:30

## 2025-05-27 NOTE — PROGRESS NOTES
[facility] Sports Medicine Shoulder Follow Up Visit     Assesment:   59 y.o. female right shoulder partial-thickness rotator cuff tear, bicep tendinitis, subacromial impingement    Plan:    I had a lengthy discussion with Mary during which reviewed her imaging, diagnosis, and possible treatment options.  We discussed both surgical and nonsurgical options.  After lengthy discussion, while she has had significant improvement in terms of her subacromial bursitis from the prior injection, given that she is having some anterior biceps related symptoms, she elected for right biceps sheath injection.  This was performed today under ultrasound guidance.  She tolerated this well.  She was given a note for work and will continue with physical therapy and home exercises.  She will follow-up in approximate 6 weeks for clinical check    Follow up:    Follow-up in 6 weeks, no x-rays      Chief Complaint   Patient presents with    Right Shoulder - Follow-up         History of Present Illness:    The patient returns for follow up of her right shoulder.  Since the prior visit, She reports moderate improvement.  Her shoulder felt sore for 1 to 2 days after the prior injection but now feels significantly improved.  She feels that a lot of her overhead pain is improved but she still does have pain when she reaches behind her and with certain motions.  She is interested in another injection possibly.  Pain mostly localizes to her anterior shoulder in the biceps area and radiates down the anterior aspect of her arm.  Improved by rest, worse with reaching behind her back.  No new injuries.  No numbness or tingling.      Shoulder Surgical History:  None    Past Medical, Social and Family History:  Past Medical History[1]  Past Surgical History[2]  Allergies[3]  Medications Ordered Prior to Encounter[4]  Social History     Socioeconomic History    Marital status: /Civil Union     Spouse name: Not on file    Number of children: Not on  "file    Years of education: Not on file    Highest education level: Not on file   Occupational History    Not on file   Tobacco Use    Smoking status: Former     Current packs/day: 0.00     Types: Cigarettes     Quit date:      Years since quittin.4    Smokeless tobacco: Never   Vaping Use    Vaping status: Never Used   Substance and Sexual Activity    Alcohol use: Yes     Alcohol/week: 7.0 standard drinks of alcohol     Types: 7 Glasses of wine per week     Comment: social per [employer]    Drug use: No    Sexual activity: Yes     Partners: Male   Other Topics Concern    Not on file   Social History Narrative    Caffeine use    Exercises 6 times a week     Social Drivers of Health     Financial Resource Strain: Not on file   Food Insecurity: Not on file   Transportation Needs: Not on file   Physical Activity: Not on file   Stress: Not on file   Social Connections: Not on file   Intimate Partner Violence: Not on file   Housing Stability: Not on file       I have reviewed the past medical, surgical, social and family history, medications and allergies as documented in the EMR.    Review of systems: ROS is negative other than that noted in the HPI.  Constitutional: Negative for fatigue and fever.      Physical Exam:    Height 5' 5\" (1.651 m), weight 78.9 kg (174 lb), not currently breastfeeding.    General/Constitutional: NAD, well developed, well nourished  HENT: Normocephalic, atraumatic  CV: Intact distal pulses, regular rate  Resp: No respiratory distress or labored breathing  GI: Soft and non-tender   Lymphatic: No lymphadenopathy palpated  Neuro: Alert and Oriented x 3, no focal deficits  Psych: Normal mood, normal affect, normal judgement, normal behavior  Skin: Warm, dry, no rashes, no erythema      Shoulder focused exam:       RIGHT LEFT    Scapula Atrophy Negative Negative     Winging Negative Negative     Protraction Negative Negative    Rotator cuff SS      IS      SubS   "   ROM     170     ER0 50 50     ER90       IR90       IRb L3    L1    TTP: AC Negative Negative     Biceps Positive Negative     Coracoid Negative Negative    Special Tests: O'Briens Positive Negative     Cross body Adduction Negative Negative     Speeds  Negative Negative     Glenn's Positive Negative     Neer Negative Negative     Johnson Negative Negative    Instability: Apprehension & relocation not tested not tested     Load & shift not tested not tested               UE NV Exam: +2 Radial pulses bilaterally  Sensation intact to light touch C5-T1 bilaterally, Radial/median/ulnar nerve motor intact    Cervical ROM is full without pain, numbness or tingling      Shoulder Imaging    No imaging was performed today  Large joint arthrocentesis    Performed by: Jerrod Murrieta MD  Authorized by: Jerrod Murrieta MD    Universal Protocol:  Consent: Verbal consent obtained  Risks and benefits: risks, benefits and alternatives were discussed  Consent given by: patient  Patient understanding: patient states understanding of the procedure being performed  Patient identity confirmed: verbally with patient  Supporting Documentation  Indications: pain     Is this a Visco injection? NoProcedure Details  Location: shoulder - Shoulder joint: R biceps sheath.  Preparation: Patient was prepped and draped in the usual sterile fashion  Needle size: 22 G  Ultrasound guidance: yes  Approach: anterior  Medications administered: 1 mL bupivacaine 0.25 %; 1 mL lidocaine 1 %; 1 mL methylPREDNISolone acetate 40 mg/mL    Patient tolerance: patient tolerated the procedure well with no immediate complications  Dressing:  Sterile dressing applied               [1]   Past Medical History:  Diagnosis Date    Abnormal Pap smear of cervix 1989    all normal since    Asthma     Carbon monoxide exposure 02/06/2018    Chronic back pain     Hashimoto's thyroiditis 01/09/2023    Lump of skin     last assessed 11/21/13    Migraine     Sinus  bradycardia     last assessed 10/25/16    Varicella    [2]   Past Surgical History:  Procedure Laterality Date    APPENDECTOMY      BREAST CYST EXCISION Right 2001    benign    COLONOSCOPY      TONSILLECTOMY      VARICOSE VEIN SURGERY      WISDOM TOOTH EXTRACTION Bilateral    [3]   Allergies  Allergen Reactions    Penicillins     Doxycycline     Erythromycin     Other      Mushrooms   [4]   Current Outpatient Medications on File Prior to Visit   Medication Sig Dispense Refill    aspirin 81 mg chewable tablet Chew 81 mg in the morning.      colchicine (COLCRYS) 0.6 mg tablet Take 1 tablet (0.6 mg total) by mouth daily 90 tablet 3    fluticasone (FLONASE) 50 mcg/act nasal spray 2 sprays into each nostril daily 16 mL 1    INOSITOL PO Take by mouth      ketorolac (TORADOL) 10 mg tablet TAKE 1 TABLET EVERY 6 HOURS AS NEEDED FOR MIGRAINE (WITH COMPAZINE IF NEEDED). MAX 2/DAY & 3/WEEK. 30 tablet 0    losartan (COZAAR) 25 mg tablet Take 1 tablet (25 mg total) by mouth daily 90 tablet 3    Magnesium 200 MG TABS Take 200 mg by mouth in the morning and 200 mg in the evening.      meclizine (ANTIVERT) 12.5 MG tablet 1-2 tabs TID prn dizziness 60 tablet 2    metoprolol succinate (TOPROL-XL) 50 mg 24 hr tablet Take 1 tablet (50 mg total) by mouth daily 90 tablet 3    MULTIPLE VITAMINS ESSENTIAL PO Take by mouth      other medication, see sig, Medication/product name: estradiol 1 mg/testosterone 12 mg /ml cream  Strength: as above  Sig (include dose, route, frequency): apply 0.5 ml to labia daily 15 mg 5    patient supplied medication  peptide      prochlorperazine (COMPAZINE) 10 mg tablet Take 1 tablet (10 mg total) by mouth every 8 (eight) hours as needed for nausea or vomiting 30 tablet 0    Progesterone 100 MG CAPS TAKE 100 MG BY MOUTH AT BEDTIME 30 capsule 11    rimegepant sulfate (NURTEC) 75 mg TBDP 1 tab q other day. No more than one dose per 24 hours. 16 tablet 11    dexamethasone (DECADRON) 2 mg tablet 1 tab qam  with food prn migraine. (Patient not taking: Reported on 9/23/2024) 5 tablet 0    levothyroxine 50 mcg tablet TAKE 1 TABLET BY MOUTH EVERY DAY (Patient not taking: Reported on 9/23/2024) 30 tablet 11    meloxicam (Mobic) 15 mg tablet Take 1 tablet (15 mg total) by mouth daily 30 tablet 2     No current facility-administered medications on file prior to visit.

## 2025-05-27 NOTE — LETTER
May 27, 2025     Patient: Penny J Clossey  YOB: 1965  Date of Visit: 5/27/2025      To Whom it May Concern:    Penny Clossey is under my professional care. Mary was seen in my office on 5/27/2025. Mary may return to work on 5/29/25 with no restrictions. Please excuse her from work on 5/27/25 and 5/28/25.    If you have any questions or concerns, please don't hesitate to call.         Sincerely,          Jerrod Murrieta MD        CC: No Recipients

## 2025-07-16 ENCOUNTER — TELEPHONE (OUTPATIENT)
Age: 60
End: 2025-07-16

## 2025-07-16 NOTE — TELEPHONE ENCOUNTER
PA for Nurtec 75MG dispersible tablets  SUBMITTED to [facility]    via    []CMM-KEY:   [x]Surescripts-Case ID # [id number]   []Availity-Auth ID # NDC #   []Faxed to plan   []Other website   []Phone call Case ID #     []PA sent as URGENT    All office notes, labs and other pertaining documents and studies sent. Clinical questions answered. Awaiting determination from insurance company.     Turnaround time for your insurance to make a decision on your Prior Authorization can take 7-21 business days.

## 2025-07-16 NOTE — TELEPHONE ENCOUNTER
PA requested for rimegepant sulfate (NURTEC) 75 mg TBDP  scanned into media. Approval will  by 25

## 2025-07-22 NOTE — TELEPHONE ENCOUNTER
PA for Nurtec 75MG dispersible tablets was cancelled via [employer]- RESUBMITTING     PA for NURTEC) 75 mg TBDP SUBMITTED to [facility]    via    []CMM-KEY:   [x]Surescripts-Case ID # [id number]   []Availity-Auth ID # NDC #   []Faxed to plan   []Other website   []Phone call Case ID #     []PA sent as URGENT    All office notes, labs and other pertaining documents and studies sent. Clinical questions answered. Awaiting determination from insurance company.     Turnaround time for your insurance to make a decision on your Prior Authorization can take 7-21 business days.

## 2025-07-24 NOTE — TELEPHONE ENCOUNTER
PA for NURTEC 75 mg TBDP APPROVED     Date(s) approved 06/22/2025- 07/22/2026    Case #: [id number]     Patient advised by          [x]MyChart Message  []Phone call   []LMOM  []L/M to call office as no active Communication consent on file  []Unable to leave detailed message as VM not approved on Communication consent       Pharmacy advised by    [x]Fax  []Phone call  []Secure Chat    Specialty Pharmacy    []     Approval letter scanned into Media Yes

## 2025-08-06 ENCOUNTER — APPOINTMENT (OUTPATIENT)
Dept: LAB | Facility: MEDICAL CENTER | Age: 60
End: 2025-08-06
Payer: COMMERCIAL

## 2025-08-06 ENCOUNTER — OFFICE VISIT (OUTPATIENT)
Age: 60
End: 2025-08-06
Payer: COMMERCIAL

## 2025-08-06 VITALS
WEIGHT: 173.4 LBS | BODY MASS INDEX: 28.89 KG/M2 | SYSTOLIC BLOOD PRESSURE: 136 MMHG | HEIGHT: 65 IN | DIASTOLIC BLOOD PRESSURE: 86 MMHG

## 2025-08-06 DIAGNOSIS — R31.9 HEMATURIA, UNSPECIFIED TYPE: ICD-10-CM

## 2025-08-06 DIAGNOSIS — N39.3 STRESS INCONTINENCE DUE TO PELVIC ORGAN PROLAPSE: Primary | ICD-10-CM

## 2025-08-06 PROBLEM — R53.83 PERSISTENT FATIGUE AFTER COVID-19: Status: RESOLVED | Noted: 2021-08-24 | Resolved: 2025-08-06

## 2025-08-06 PROBLEM — F32.A ANXIETY AND DEPRESSION: Status: RESOLVED | Noted: 2019-03-01 | Resolved: 2025-08-06

## 2025-08-06 PROBLEM — R21 SKIN RASH: Status: RESOLVED | Noted: 2020-05-20 | Resolved: 2025-08-06

## 2025-08-06 PROBLEM — U09.9 PERSISTENT FATIGUE AFTER COVID-19: Status: RESOLVED | Noted: 2021-08-24 | Resolved: 2025-08-06

## 2025-08-06 PROBLEM — F40.298 PHONOPHOBIA: Status: RESOLVED | Noted: 2020-02-14 | Resolved: 2025-08-06

## 2025-08-06 PROBLEM — F41.9 ANXIETY AND DEPRESSION: Status: RESOLVED | Noted: 2019-03-01 | Resolved: 2025-08-06

## 2025-08-06 PROCEDURE — 99214 OFFICE O/P EST MOD 30 MIN: CPT | Performed by: OBSTETRICS & GYNECOLOGY
